# Patient Record
Sex: FEMALE | Race: WHITE | NOT HISPANIC OR LATINO | Employment: UNEMPLOYED | URBAN - METROPOLITAN AREA
[De-identification: names, ages, dates, MRNs, and addresses within clinical notes are randomized per-mention and may not be internally consistent; named-entity substitution may affect disease eponyms.]

---

## 2023-10-09 ENCOUNTER — HOSPITAL ENCOUNTER (EMERGENCY)
Facility: HOSPITAL | Age: 83
Discharge: HOME | End: 2023-10-09
Attending: STUDENT IN AN ORGANIZED HEALTH CARE EDUCATION/TRAINING PROGRAM
Payer: MEDICARE

## 2023-10-09 VITALS
BODY MASS INDEX: 46.65 KG/M2 | RESPIRATION RATE: 16 BRPM | SYSTOLIC BLOOD PRESSURE: 132 MMHG | HEIGHT: 65 IN | OXYGEN SATURATION: 96 % | TEMPERATURE: 98.1 F | DIASTOLIC BLOOD PRESSURE: 64 MMHG | WEIGHT: 279.98 LBS | HEART RATE: 72 BPM

## 2023-10-09 DIAGNOSIS — N30.90 CYSTITIS: Primary | ICD-10-CM

## 2023-10-09 LAB
APPEARANCE UR: CLEAR
BILIRUB UR STRIP.AUTO-MCNC: NEGATIVE MG/DL
COLOR UR: ABNORMAL
GLUCOSE UR STRIP.AUTO-MCNC: NORMAL MG/DL
KETONES UR STRIP.AUTO-MCNC: NEGATIVE MG/DL
LEUKOCYTE ESTERASE UR QL STRIP.AUTO: ABNORMAL
NITRITE UR QL STRIP.AUTO: NEGATIVE
PH UR STRIP.AUTO: 7.5 [PH]
PROT UR STRIP.AUTO-MCNC: ABNORMAL MG/DL
RBC # UR STRIP.AUTO: NEGATIVE /UL
SP GR UR STRIP.AUTO: 1.01
UROBILINOGEN UR STRIP.AUTO-MCNC: NORMAL MG/DL

## 2023-10-09 PROCEDURE — 99283 EMERGENCY DEPT VISIT LOW MDM: CPT | Performed by: STUDENT IN AN ORGANIZED HEALTH CARE EDUCATION/TRAINING PROGRAM

## 2023-10-09 PROCEDURE — 87086 URINE CULTURE/COLONY COUNT: CPT | Mod: CMCLAB,WESLAB | Performed by: STUDENT IN AN ORGANIZED HEALTH CARE EDUCATION/TRAINING PROGRAM

## 2023-10-09 PROCEDURE — 2500000001 HC RX 250 WO HCPCS SELF ADMINISTERED DRUGS (ALT 637 FOR MEDICARE OP): Performed by: STUDENT IN AN ORGANIZED HEALTH CARE EDUCATION/TRAINING PROGRAM

## 2023-10-09 PROCEDURE — 81003 URINALYSIS AUTO W/O SCOPE: CPT | Performed by: STUDENT IN AN ORGANIZED HEALTH CARE EDUCATION/TRAINING PROGRAM

## 2023-10-09 RX ORDER — CARVEDILOL 25 MG/1
25 TABLET ORAL
COMMUNITY
End: 2023-11-15 | Stop reason: HOSPADM

## 2023-10-09 RX ORDER — FERROUS SULFATE 325(65) MG
65 TABLET, DELAYED RELEASE (ENTERIC COATED) ORAL
Status: ON HOLD | COMMUNITY
End: 2023-10-17

## 2023-10-09 RX ORDER — INSULIN ASPART 100 [IU]/ML
100 INJECTION, SOLUTION INTRAVENOUS; SUBCUTANEOUS
COMMUNITY
End: 2023-11-15 | Stop reason: HOSPADM

## 2023-10-09 RX ORDER — VITAMIN E MIXED 400 UNIT
100 CAPSULE ORAL DAILY
Status: ON HOLD | COMMUNITY
End: 2023-10-17

## 2023-10-09 RX ORDER — CEFDINIR 300 MG/1
300 CAPSULE ORAL ONCE
Status: COMPLETED | OUTPATIENT
Start: 2023-10-09 | End: 2023-10-09

## 2023-10-09 RX ORDER — CEFDINIR 300 MG/1
300 CAPSULE ORAL 2 TIMES DAILY
Qty: 14 CAPSULE | Refills: 0 | Status: SHIPPED | OUTPATIENT
Start: 2023-10-09 | End: 2023-11-15 | Stop reason: HOSPADM

## 2023-10-09 RX ORDER — CALCIUM CARBONATE 300MG(750)
400 TABLET,CHEWABLE ORAL DAILY
Status: ON HOLD | COMMUNITY
End: 2023-11-15 | Stop reason: SDUPTHER

## 2023-10-09 RX ORDER — ATORVASTATIN CALCIUM 40 MG/1
40 TABLET, FILM COATED ORAL DAILY
COMMUNITY
End: 2023-11-15 | Stop reason: HOSPADM

## 2023-10-09 RX ORDER — LOSARTAN POTASSIUM 50 MG/1
100 TABLET ORAL DAILY
COMMUNITY
End: 2023-11-15 | Stop reason: HOSPADM

## 2023-10-09 RX ORDER — PEN NEEDLE, DIABETIC 30 GX3/16"
NEEDLE, DISPOSABLE MISCELLANEOUS
COMMUNITY

## 2023-10-09 RX ORDER — OMEPRAZOLE 40 MG/1
40 CAPSULE, DELAYED RELEASE ORAL
COMMUNITY
End: 2023-11-15 | Stop reason: HOSPADM

## 2023-10-09 RX ORDER — FLUTICASONE PROPIONATE AND SALMETEROL XINAFOATE 115; 21 UG/1; UG/1
2 AEROSOL, METERED RESPIRATORY (INHALATION)
Status: ON HOLD | COMMUNITY
End: 2023-10-17

## 2023-10-09 RX ORDER — VENLAFAXINE HYDROCHLORIDE 150 MG/1
150 CAPSULE, EXTENDED RELEASE ORAL DAILY
COMMUNITY
End: 2023-11-15 | Stop reason: HOSPADM

## 2023-10-09 RX ORDER — AMLODIPINE BESYLATE 10 MG/1
10 TABLET ORAL DAILY
COMMUNITY
End: 2023-11-15 | Stop reason: HOSPADM

## 2023-10-09 RX ORDER — HYDROCHLOROTHIAZIDE 50 MG/1
50 TABLET ORAL DAILY
Status: ON HOLD | COMMUNITY
End: 2023-10-17

## 2023-10-09 RX ORDER — LATANOPROST 50 UG/ML
1 SOLUTION/ DROPS OPHTHALMIC NIGHTLY
Status: ON HOLD | COMMUNITY
End: 2023-11-15 | Stop reason: SDUPTHER

## 2023-10-09 RX ADMIN — CEFDINIR 300 MG: 300 CAPSULE ORAL at 11:57

## 2023-10-09 ASSESSMENT — PAIN - FUNCTIONAL ASSESSMENT
PAIN_FUNCTIONAL_ASSESSMENT: 0-10
PAIN_FUNCTIONAL_ASSESSMENT: 0-10

## 2023-10-09 ASSESSMENT — PAIN DESCRIPTION - LOCATION: LOCATION: BACK

## 2023-10-09 ASSESSMENT — LIFESTYLE VARIABLES
HAVE YOU EVER FELT YOU SHOULD CUT DOWN ON YOUR DRINKING: NO
EVER FELT BAD OR GUILTY ABOUT YOUR DRINKING: NO
EVER HAD A DRINK FIRST THING IN THE MORNING TO STEADY YOUR NERVES TO GET RID OF A HANGOVER: NO
HAVE PEOPLE ANNOYED YOU BY CRITICIZING YOUR DRINKING: NO

## 2023-10-09 ASSESSMENT — PAIN DESCRIPTION - ORIENTATION: ORIENTATION: LOWER

## 2023-10-09 ASSESSMENT — COLUMBIA-SUICIDE SEVERITY RATING SCALE - C-SSRS
1. IN THE PAST MONTH, HAVE YOU WISHED YOU WERE DEAD OR WISHED YOU COULD GO TO SLEEP AND NOT WAKE UP?: NO
2. HAVE YOU ACTUALLY HAD ANY THOUGHTS OF KILLING YOURSELF?: NO
6. HAVE YOU EVER DONE ANYTHING, STARTED TO DO ANYTHING, OR PREPARED TO DO ANYTHING TO END YOUR LIFE?: NO

## 2023-10-09 ASSESSMENT — PAIN SCALES - GENERAL: PAINLEVEL_OUTOF10: 2

## 2023-10-09 NOTE — ED PROVIDER NOTES
HPI   Chief Complaint   Patient presents with    Urinary Frequency       Patient is an 83-year-old female that presents emergency department for evaluation of dysuria, suprapubic pressure.  Patient states symptoms started yesterday evening.  She has had multiple episodes of feeling as though she needs to urinate throughout that time.  It has been gradually getting worse.  She admits to some mild suprapubic pressure throughout that time as well.  She denies dysuria, fever, chills, chest pain, shortness of breath, nausea or vomiting.  Out of concern for possible urinary tract infection she came in for further treatment and evaluation.      History provided by:  Patient                      Iban Coma Scale Score: 15                  Patient History   Past Medical History:   Diagnosis Date    Diabetes mellitus (CMS/HCC)     Hypertension      Past Surgical History:   Procedure Laterality Date    APPENDECTOMY  10/13/2015    Appendectomy    CATARACT EXTRACTION  10/13/2015    Cataract Surgery    CHOLECYSTECTOMY  10/13/2015    Cholecystectomy    CT GUIDED IMAGING FOR NEEDLE PLACEMENT  2/4/2015    CT GUIDED IMAGING FOR NEEDLE PLACEMENT [facility]    HEMICOLECTOMY  10/13/2015    Hemicolectomy    HYSTERECTOMY  10/13/2015    Hysterectomy    LUNG LOBECTOMY  10/13/2015    Lung Lobectomy     No family history on file.  Social History     Tobacco Use    Smoking status: Never    Smokeless tobacco: Never   Substance Use Topics    Alcohol use: Not on file    Drug use: Not on file       Physical Exam   ED Triage Vitals [10/09/23 0902]   Temp Heart Rate Resp BP   36.7 °C (98.1 °F) 73 16 175/75      SpO2 Temp Source Heart Rate Source Patient Position   96 % Oral Monitor Sitting      BP Location FiO2 (%)     Left arm --       Physical Exam  Vitals and nursing note reviewed.   Constitutional:       General: She is not in acute distress.     Appearance: Normal appearance. She is not ill-appearing.   HENT:      Head: Normocephalic  and atraumatic.   Eyes:      Extraocular Movements: Extraocular movements intact.      Pupils: Pupils are equal, round, and reactive to light.   Cardiovascular:      Rate and Rhythm: Normal rate and regular rhythm.   Pulmonary:      Effort: Pulmonary effort is normal. No respiratory distress.   Abdominal:      General: Abdomen is flat. There is no distension.      Palpations: Abdomen is soft.      Tenderness: There is no abdominal tenderness. There is no guarding or rebound.   Musculoskeletal:         General: No swelling.   Skin:     General: Skin is warm and dry.   Neurological:      General: No focal deficit present.      Mental Status: She is alert.       Recent Results (from the past 24 hour(s))   Urinalysis with Reflex Microscopic and Culture    Collection Time: 10/09/23  9:32 AM   Result Value Ref Range    Color, Urine Light-Yellow Light-Yellow, Yellow, Dark-Yellow    Appearance, Urine Clear Clear    Specific Gravity, Urine 1.014 1.005 - 1.035    pH, Urine 7.5 5.0, 5.5, 6.0, 6.5, 7.0, 7.5, 8.0    Protein, Urine 100 (2+) (A) NEGATIVE, 10 (TRACE), 20 (TRACE) mg/dL    Glucose, Urine Normal Normal mg/dL    Blood, Urine NEGATIVE NEGATIVE    Ketones, Urine NEGATIVE NEGATIVE mg/dL    Bilirubin, Urine NEGATIVE NEGATIVE    Urobilinogen, Urine Normal Normal mg/dL    Nitrite, Urine NEGATIVE NEGATIVE    Leukocyte Esterase, Urine 75 Leatha/µL (A) NEGATIVE         ED Course & MDM   Diagnoses as of 10/09/23 1109   Cystitis       Medical Decision Making  Patient is an 83-year-old female who presents emergency department for evaluation of increased urinary frequency.  Patient uncomfortable appearing but in no obvious distress on exam.  Vital signs are stable on arrival.  She is awake and alert and oriented and nontoxic-appearing.  Urinalysis ordered at this time however given her benign abdominal exam no CT scan of the abdomen pelvis was ordered.  Urinalysis is consistent with a urinary tract infection and patient will be  treated with p.o. cefdinir.  She was given first dose in the emergency department.  Return precautions were discussed with patient and she is agreeable for outpatient follow-up with her primary care physician.        Procedure  Procedures     Edgardo Villaseñor DO  10/09/23 1111

## 2023-10-09 NOTE — DISCHARGE INSTRUCTIONS
Follow-up with your primary care physician in the next 2 to 3 days for reevaluation.  If symptoms worsen or change he can return at any time for further evaluation and treatment.  It is very important that you take antibiotics to completion and stay hydrated at home by drinking plenty of fluids.

## 2023-10-10 LAB — BACTERIA UR CULT: NORMAL

## 2023-10-16 ENCOUNTER — HOSPITAL ENCOUNTER (EMERGENCY)
Facility: HOSPITAL | Age: 83
Discharge: OTHER INSTITUTION | DRG: 885 | End: 2023-10-17
Attending: STUDENT IN AN ORGANIZED HEALTH CARE EDUCATION/TRAINING PROGRAM
Payer: MEDICARE

## 2023-10-16 DIAGNOSIS — F32.A DEPRESSION WITH SUICIDAL IDEATION: Primary | ICD-10-CM

## 2023-10-16 DIAGNOSIS — R45.851 DEPRESSION WITH SUICIDAL IDEATION: Primary | ICD-10-CM

## 2023-10-16 LAB
ALBUMIN SERPL-MCNC: 3.7 G/DL (ref 3.5–5)
ALP BLD-CCNC: 74 U/L (ref 35–125)
ALT SERPL-CCNC: 11 U/L (ref 5–40)
AMPHETAMINES UR QL SCN>1000 NG/ML: NEGATIVE
ANION GAP SERPL CALC-SCNC: 11 MMOL/L
APPEARANCE UR: CLEAR
AST SERPL-CCNC: 16 U/L (ref 5–40)
BARBITURATES UR QL SCN>300 NG/ML: NEGATIVE
BASOPHILS # BLD AUTO: 0.04 X10*3/UL (ref 0–0.1)
BASOPHILS NFR BLD AUTO: 0.6 %
BENZODIAZ UR QL SCN>300 NG/ML: NEGATIVE
BILIRUB SERPL-MCNC: 0.3 MG/DL (ref 0.1–1.2)
BILIRUB UR STRIP.AUTO-MCNC: NEGATIVE MG/DL
BUN SERPL-MCNC: 31 MG/DL (ref 8–25)
BZE UR QL SCN>300 NG/ML: NEGATIVE
CALCIUM SERPL-MCNC: 9.3 MG/DL (ref 8.5–10.4)
CANNABINOIDS UR QL SCN>50 NG/ML: NEGATIVE
CHLORIDE SERPL-SCNC: 108 MMOL/L (ref 97–107)
CO2 SERPL-SCNC: 26 MMOL/L (ref 24–31)
COLOR UR: YELLOW
CREAT SERPL-MCNC: 1.2 MG/DL (ref 0.4–1.6)
EOSINOPHIL # BLD AUTO: 0.14 X10*3/UL (ref 0–0.4)
EOSINOPHIL NFR BLD AUTO: 2.2 %
ERYTHROCYTE [DISTWIDTH] IN BLOOD BY AUTOMATED COUNT: 14.3 % (ref 11.5–14.5)
FENTANYL+NORFENTANYL UR QL SCN: NEGATIVE
GFR SERPL CREATININE-BSD FRML MDRD: 46 ML/MIN/1.73M*2
GLUCOSE SERPL-MCNC: 199 MG/DL (ref 65–99)
GLUCOSE UR STRIP.AUTO-MCNC: ABNORMAL MG/DL
HCT VFR BLD AUTO: 44.1 % (ref 36–46)
HGB BLD-MCNC: 14.1 G/DL (ref 12–16)
HOLD SPECIMEN: NORMAL
HYALINE CASTS #/AREA URNS AUTO: ABNORMAL /LPF
IMM GRANULOCYTES # BLD AUTO: 0.01 X10*3/UL (ref 0–0.5)
IMM GRANULOCYTES NFR BLD AUTO: 0.2 % (ref 0–0.9)
KETONES UR STRIP.AUTO-MCNC: NEGATIVE MG/DL
LEUKOCYTE ESTERASE UR QL STRIP.AUTO: ABNORMAL
LYMPHOCYTES # BLD AUTO: 1.58 X10*3/UL (ref 0.8–3)
LYMPHOCYTES NFR BLD AUTO: 24.5 %
MCH RBC QN AUTO: 28.7 PG (ref 26–34)
MCHC RBC AUTO-ENTMCNC: 32 G/DL (ref 32–36)
MCV RBC AUTO: 90 FL (ref 80–100)
METHADONE UR QL SCN>300 NG/ML: NEGATIVE
MONOCYTES # BLD AUTO: 0.82 X10*3/UL (ref 0.05–0.8)
MONOCYTES NFR BLD AUTO: 12.7 %
MUCOUS THREADS #/AREA URNS AUTO: ABNORMAL /LPF
NEUTROPHILS # BLD AUTO: 3.85 X10*3/UL (ref 1.6–5.5)
NEUTROPHILS NFR BLD AUTO: 59.8 %
NITRITE UR QL STRIP.AUTO: NEGATIVE
NRBC BLD-RTO: 0 /100 WBCS (ref 0–0)
OPIATES UR QL SCN>300 NG/ML: NEGATIVE
OXYCODONE UR QL: NEGATIVE
PCP UR QL SCN>25 NG/ML: POSITIVE
PH UR STRIP.AUTO: 5 [PH]
PLATELET # BLD AUTO: 223 X10*3/UL (ref 150–450)
PMV BLD AUTO: 10.5 FL (ref 7.5–11.5)
POTASSIUM SERPL-SCNC: 3.7 MMOL/L (ref 3.4–5.1)
PROT SERPL-MCNC: 6.9 G/DL (ref 5.9–7.9)
PROT UR STRIP.AUTO-MCNC: ABNORMAL MG/DL
RBC # BLD AUTO: 4.91 X10*6/UL (ref 4–5.2)
RBC # UR STRIP.AUTO: NEGATIVE /UL
RBC #/AREA URNS AUTO: ABNORMAL /HPF
SARS-COV-2 RNA RESP QL NAA+PROBE: NOT DETECTED
SODIUM SERPL-SCNC: 145 MMOL/L (ref 133–145)
SP GR UR STRIP.AUTO: 1.02
SQUAMOUS #/AREA URNS AUTO: ABNORMAL /HPF
TRANS CELLS #/AREA UR COMP ASSIST: ABNORMAL /HPF
UROBILINOGEN UR STRIP.AUTO-MCNC: NORMAL MG/DL
WBC # BLD AUTO: 6.4 X10*3/UL (ref 4.4–11.3)
WBC #/AREA URNS AUTO: ABNORMAL /HPF

## 2023-10-16 PROCEDURE — 99284 EMERGENCY DEPT VISIT MOD MDM: CPT | Performed by: STUDENT IN AN ORGANIZED HEALTH CARE EDUCATION/TRAINING PROGRAM

## 2023-10-16 PROCEDURE — 80307 DRUG TEST PRSMV CHEM ANLYZR: CPT

## 2023-10-16 PROCEDURE — 36415 COLL VENOUS BLD VENIPUNCTURE: CPT

## 2023-10-16 PROCEDURE — 87086 URINE CULTURE/COLONY COUNT: CPT | Mod: CMCLAB,WESLAB

## 2023-10-16 PROCEDURE — 81001 URINALYSIS AUTO W/SCOPE: CPT

## 2023-10-16 PROCEDURE — 80320 DRUG SCREEN QUANTALCOHOLS: CPT | Mod: CMCLAB,WESLAB

## 2023-10-16 PROCEDURE — 85025 COMPLETE CBC W/AUTO DIFF WBC: CPT

## 2023-10-16 PROCEDURE — 80329 ANALGESICS NON-OPIOID 1 OR 2: CPT | Mod: CMCLAB,WESLAB

## 2023-10-16 PROCEDURE — 87635 SARS-COV-2 COVID-19 AMP PRB: CPT

## 2023-10-16 PROCEDURE — 83992 ASSAY FOR PHENCYCLIDINE: CPT

## 2023-10-16 PROCEDURE — 80053 COMPREHEN METABOLIC PANEL: CPT

## 2023-10-16 PROCEDURE — G0480 DRUG TEST DEF 1-7 CLASSES: HCPCS | Mod: CMCLAB,WESLAB

## 2023-10-16 PROCEDURE — G0480 DRUG TEST DEF 1-7 CLASSES: HCPCS

## 2023-10-16 SDOH — HEALTH STABILITY: MENTAL HEALTH: NON-SPECIFIC ACTIVE SUICIDAL THOUGHTS (PAST 1 MONTH): NO

## 2023-10-16 SDOH — HEALTH STABILITY: MENTAL HEALTH: DEPRESSION SYMPTOMS: CRYING;FEELINGS OF WORTHLESSNESS;FEELINGS OF HELPLESSNESS

## 2023-10-16 SDOH — HEALTH STABILITY: MENTAL HEALTH: ARE YOU HAVING THOUGHTS OF KILLING YOURSELF RIGHT NOW?: NO

## 2023-10-16 SDOH — HEALTH STABILITY: MENTAL HEALTH: SUICIDAL BEHAVIOR (LIFETIME): NO

## 2023-10-16 SDOH — HEALTH STABILITY: MENTAL HEALTH: IN THE PAST WEEK, HAVE YOU BEEN HAVING THOUGHTS ABOUT KILLING YOURSELF?: YES

## 2023-10-16 SDOH — HEALTH STABILITY: MENTAL HEALTH: IN THE PAST FEW WEEKS, HAVE YOU FELT THAT YOU OR YOUR FAMILY WOULD BE BETTER OFF IF YOU WERE DEAD?: YES

## 2023-10-16 SDOH — HEALTH STABILITY: MENTAL HEALTH: ANXIETY SYMPTOMS: GENERALIZED

## 2023-10-16 SDOH — HEALTH STABILITY: MENTAL HEALTH: IN THE PAST FEW WEEKS, HAVE YOU WISHED YOU WERE DEAD?: YES

## 2023-10-16 SDOH — HEALTH STABILITY: MENTAL HEALTH: WISH TO BE DEAD (PAST 1 MONTH): YES

## 2023-10-16 SDOH — HEALTH STABILITY: MENTAL HEALTH: HAVE YOU EVER TRIED TO KILL YOURSELF?: NO

## 2023-10-16 ASSESSMENT — LIFESTYLE VARIABLES
HAVE PEOPLE ANNOYED YOU BY CRITICIZING YOUR DRINKING: NO
EVER HAD A DRINK FIRST THING IN THE MORNING TO STEADY YOUR NERVES TO GET RID OF A HANGOVER: NO
SUBSTANCE_ABUSE_PAST_12_MONTHS: NO
PRESCIPTION_ABUSE_PAST_12_MONTHS: NO
HAVE YOU EVER FELT YOU SHOULD CUT DOWN ON YOUR DRINKING: NO
EVER FELT BAD OR GUILTY ABOUT YOUR DRINKING: NO
REASON UNABLE TO ASSESS: NO

## 2023-10-16 ASSESSMENT — PAIN SCALES - GENERAL
PAINLEVEL_OUTOF10: 0 - NO PAIN
PAINLEVEL_OUTOF10: 0 - NO PAIN

## 2023-10-16 ASSESSMENT — PAIN - FUNCTIONAL ASSESSMENT: PAIN_FUNCTIONAL_ASSESSMENT: 0-10
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
## 2023-10-16 NOTE — ED TRIAGE NOTES
Pt brought in By [facility] Pt states she's depressed and wants to talk to someone.Pt states she doesn't want to live anymore.Pt is also homeless.
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
## 2023-10-17 ENCOUNTER — HOSPITAL ENCOUNTER (INPATIENT)
Facility: HOSPITAL | Age: 83
LOS: 29 days | Discharge: HOSPICE | DRG: 885 | End: 2023-11-15
Attending: PSYCHIATRY & NEUROLOGY | Admitting: PSYCHIATRY & NEUROLOGY
Payer: MEDICARE

## 2023-10-17 VITALS
BODY MASS INDEX: 47.63 KG/M2 | SYSTOLIC BLOOD PRESSURE: 148 MMHG | HEART RATE: 79 BPM | WEIGHT: 279 LBS | RESPIRATION RATE: 16 BRPM | OXYGEN SATURATION: 98 % | TEMPERATURE: 96.8 F | HEIGHT: 64 IN | DIASTOLIC BLOOD PRESSURE: 72 MMHG

## 2023-10-17 DIAGNOSIS — K21.9 GASTROESOPHAGEAL REFLUX DISEASE WITHOUT ESOPHAGITIS: ICD-10-CM

## 2023-10-17 DIAGNOSIS — I10 PRIMARY HYPERTENSION: ICD-10-CM

## 2023-10-17 DIAGNOSIS — Z79.4 TYPE 2 DIABETES MELLITUS WITHOUT COMPLICATION, WITH LONG-TERM CURRENT USE OF INSULIN (MULTI): ICD-10-CM

## 2023-10-17 DIAGNOSIS — E83.42 HYPOMAGNESEMIA: ICD-10-CM

## 2023-10-17 DIAGNOSIS — E78.5 HYPERLIPIDEMIA, UNSPECIFIED HYPERLIPIDEMIA TYPE: ICD-10-CM

## 2023-10-17 DIAGNOSIS — H57.9 EYE DISEASE: ICD-10-CM

## 2023-10-17 DIAGNOSIS — F39 MOOD DISORDER (CMS-HCC): Primary | ICD-10-CM

## 2023-10-17 DIAGNOSIS — E11.9 TYPE 2 DIABETES MELLITUS WITHOUT COMPLICATION, WITH LONG-TERM CURRENT USE OF INSULIN (MULTI): ICD-10-CM

## 2023-10-17 LAB
APAP SERPL-MCNC: <10 UG/ML
ETHANOL SERPL-MCNC: <10 MG/DL
GLUCOSE BLD MANUAL STRIP-MCNC: 150 MG/DL (ref 74–99)
GLUCOSE BLD MANUAL STRIP-MCNC: 154 MG/DL (ref 74–99)
GLUCOSE BLD MANUAL STRIP-MCNC: 163 MG/DL (ref 74–99)
GLUCOSE BLD MANUAL STRIP-MCNC: 214 MG/DL (ref 74–99)
SALICYLATES SERPL-MCNC: <3 MG/DL

## 2023-10-17 PROCEDURE — 99222 1ST HOSP IP/OBS MODERATE 55: CPT | Performed by: REGISTERED NURSE

## 2023-10-17 PROCEDURE — 2500000001 HC RX 250 WO HCPCS SELF ADMINISTERED DRUGS (ALT 637 FOR MEDICARE OP): Performed by: PSYCHIATRY & NEUROLOGY

## 2023-10-17 PROCEDURE — 1140000001 HC PRIVATE PSYCH ROOM DAILY

## 2023-10-17 PROCEDURE — 82947 ASSAY GLUCOSE BLOOD QUANT: CPT

## 2023-10-17 RX ORDER — INSULIN DEGLUDEC 200 U/ML
40 INJECTION, SOLUTION SUBCUTANEOUS NIGHTLY
COMMUNITY
End: 2023-11-15 | Stop reason: HOSPADM

## 2023-10-17 RX ORDER — TRAZODONE HYDROCHLORIDE 50 MG/1
25 TABLET ORAL NIGHTLY PRN
Status: DISCONTINUED | OUTPATIENT
Start: 2023-10-17 | End: 2023-11-15 | Stop reason: HOSPADM

## 2023-10-17 RX ORDER — DIPHENHYDRAMINE HYDROCHLORIDE 50 MG/ML
50 INJECTION INTRAMUSCULAR; INTRAVENOUS ONCE AS NEEDED
Status: DISCONTINUED | OUTPATIENT
Start: 2023-10-17 | End: 2023-11-15 | Stop reason: HOSPADM

## 2023-10-17 RX ORDER — ICOSAPENT ETHYL 1 G/1
1 CAPSULE ORAL
Status: ON HOLD | COMMUNITY
End: 2023-11-15 | Stop reason: SDUPTHER

## 2023-10-17 RX ORDER — ALUMINUM HYDROXIDE, MAGNESIUM HYDROXIDE, AND SIMETHICONE 1200; 120; 1200 MG/30ML; MG/30ML; MG/30ML
30 SUSPENSION ORAL EVERY 6 HOURS PRN
Status: DISCONTINUED | OUTPATIENT
Start: 2023-10-17 | End: 2023-11-15 | Stop reason: HOSPADM

## 2023-10-17 RX ORDER — MAGNESIUM HYDROXIDE 2400 MG/10ML
10 SUSPENSION ORAL DAILY PRN
Status: DISCONTINUED | OUTPATIENT
Start: 2023-10-17 | End: 2023-10-17

## 2023-10-17 RX ORDER — OLANZAPINE 10 MG/2ML
5 INJECTION, POWDER, FOR SOLUTION INTRAMUSCULAR EVERY 6 HOURS PRN
Status: DISCONTINUED | OUTPATIENT
Start: 2023-10-17 | End: 2023-11-15 | Stop reason: HOSPADM

## 2023-10-17 RX ORDER — DIPHENHYDRAMINE HCL 50 MG
50 CAPSULE ORAL EVERY 6 HOURS PRN
Status: DISCONTINUED | OUTPATIENT
Start: 2023-10-17 | End: 2023-11-15 | Stop reason: HOSPADM

## 2023-10-17 RX ORDER — OLANZAPINE 5 MG/1
5 TABLET ORAL EVERY 6 HOURS PRN
Status: DISCONTINUED | OUTPATIENT
Start: 2023-10-17 | End: 2023-11-15 | Stop reason: HOSPADM

## 2023-10-17 RX ORDER — VENLAFAXINE HYDROCHLORIDE 150 MG/1
150 CAPSULE, EXTENDED RELEASE ORAL DAILY
Status: DISCONTINUED | OUTPATIENT
Start: 2023-10-17 | End: 2023-11-15 | Stop reason: HOSPADM

## 2023-10-17 RX ORDER — ACETAMINOPHEN 325 MG/1
650 TABLET ORAL EVERY 4 HOURS PRN
Status: DISCONTINUED | OUTPATIENT
Start: 2023-10-17 | End: 2023-11-15 | Stop reason: HOSPADM

## 2023-10-17 RX ORDER — POTASSIUM CHLORIDE 750 MG/1
20 CAPSULE, EXTENDED RELEASE ORAL DAILY
COMMUNITY
End: 2023-11-15 | Stop reason: HOSPADM

## 2023-10-17 RX ORDER — ADHESIVE BANDAGE
30 BANDAGE TOPICAL DAILY PRN
Status: DISCONTINUED | OUTPATIENT
Start: 2023-10-17 | End: 2023-11-15 | Stop reason: HOSPADM

## 2023-10-17 RX ADMIN — VENLAFAXINE HYDROCHLORIDE 150 MG: 150 CAPSULE, EXTENDED RELEASE ORAL at 16:39

## 2023-10-17 SDOH — SOCIAL STABILITY: SOCIAL INSECURITY
WITHIN THE LAST YEAR, HAVE YOU BEEN KICKED, HIT, SLAPPED, OR OTHERWISE PHYSICALLY HURT BY YOUR PARTNER OR EX-PARTNER?: NO

## 2023-10-17 SDOH — ECONOMIC STABILITY: INCOME INSECURITY: HOW HARD IS IT FOR YOU TO PAY FOR THE VERY BASICS LIKE FOOD, HOUSING, MEDICAL CARE, AND HEATING?: HARD

## 2023-10-17 SDOH — HEALTH STABILITY: MENTAL HEALTH
STRESS IS WHEN SOMEONE FEELS TENSE, NERVOUS, ANXIOUS, OR CAN'T SLEEP AT NIGHT BECAUSE THEIR MIND IS TROUBLED. HOW STRESSED ARE YOU?: TO SOME EXTENT

## 2023-10-17 SDOH — HEALTH STABILITY: MENTAL HEALTH: HAVE YOU USED ANY PRESCRIPTION DRUGS OTHER THAN PRESCRIBED IN THE PAST 12 MONTHS?: NO

## 2023-10-17 SDOH — ECONOMIC STABILITY: HOUSING INSECURITY
IN THE LAST 12 MONTHS, WAS THERE A TIME WHEN YOU DID NOT HAVE A STEADY PLACE TO SLEEP OR SLEPT IN A SHELTER (INCLUDING NOW)?: YES

## 2023-10-17 SDOH — HEALTH STABILITY: MENTAL HEALTH: HOW OFTEN DO YOU HAVE A DRINK CONTAINING ALCOHOL?: NEVER

## 2023-10-17 SDOH — SOCIAL STABILITY: SOCIAL INSECURITY: WITHIN THE LAST YEAR, HAVE YOU BEEN HUMILIATED OR EMOTIONALLY ABUSED IN OTHER WAYS BY YOUR PARTNER OR EX-PARTNER?: NO

## 2023-10-17 SDOH — HEALTH STABILITY: MENTAL HEALTH: HOW OFTEN DO YOU HAVE 6 OR MORE DRINKS ON ONE OCCASION?: NEVER

## 2023-10-17 SDOH — SOCIAL STABILITY: SOCIAL NETWORK: IN A TYPICAL WEEK, HOW MANY TIMES DO YOU TALK ON THE PHONE WITH FAMILY, FRIENDS, OR NEIGHBORS?: TWICE A WEEK

## 2023-10-17 SDOH — SOCIAL STABILITY: SOCIAL INSECURITY: WITHIN THE LAST YEAR, HAVE YOU BEEN AFRAID OF YOUR PARTNER OR EX-PARTNER?: NO

## 2023-10-17 SDOH — SOCIAL STABILITY: SOCIAL INSECURITY
WITHIN THE LAST YEAR, HAVE TO BEEN RAPED OR FORCED TO HAVE ANY KIND OF SEXUAL ACTIVITY BY YOUR PARTNER OR EX-PARTNER?: NO

## 2023-10-17 SDOH — ECONOMIC STABILITY: FOOD INSECURITY: WITHIN THE PAST 12 MONTHS, YOU WORRIED THAT YOUR FOOD WOULD RUN OUT BEFORE YOU GOT MONEY TO BUY MORE.: SOMETIMES TRUE

## 2023-10-17 SDOH — SOCIAL STABILITY: SOCIAL NETWORK: HOW OFTEN DO YOU GET TOGETHER WITH FRIENDS OR RELATIVES?: ONCE A WEEK

## 2023-10-17 SDOH — ECONOMIC STABILITY: INCOME INSECURITY: IN THE LAST 12 MONTHS, WAS THERE A TIME WHEN YOU WERE NOT ABLE TO PAY THE MORTGAGE OR RENT ON TIME?: YES

## 2023-10-17 SDOH — SOCIAL STABILITY: SOCIAL NETWORK
DO YOU BELONG TO ANY CLUBS OR ORGANIZATIONS SUCH AS CHURCH GROUPS UNIONS, FRATERNAL OR ATHLETIC GROUPS, OR SCHOOL GROUPS?: YES

## 2023-10-17 SDOH — SOCIAL STABILITY: SOCIAL NETWORK: ARE YOU MARRIED, WIDOWED, DIVORCED, SEPARATED, NEVER MARRIED, OR LIVING WITH A PARTNER?: WIDOWED

## 2023-10-17 SDOH — ECONOMIC STABILITY: HOUSING INSECURITY: IN THE LAST 12 MONTHS, HOW MANY PLACES HAVE YOU LIVED?: 2

## 2023-10-17 SDOH — ECONOMIC STABILITY: FOOD INSECURITY: WITHIN THE PAST 12 MONTHS, THE FOOD YOU BOUGHT JUST DIDN'T LAST AND YOU DIDN'T HAVE MONEY TO GET MORE.: SOMETIMES TRUE

## 2023-10-17 SDOH — HEALTH STABILITY: MENTAL HEALTH: HAVE YOU USED ANY SUBSTANCES (CANABIS, COCAINE, HEROIN, HALLUCINOGENS, INHALANTS, ETC.) IN THE PAST 12 MONTHS?: NO

## 2023-10-17 SDOH — SOCIAL STABILITY: SOCIAL NETWORK: HOW OFTEN DO YOU ATTENT MEETINGS OF THE CLUB OR ORGANIZATION YOU BELONG TO?: NEVER

## 2023-10-17 SDOH — HEALTH STABILITY: PHYSICAL HEALTH: ON AVERAGE, HOW MANY MINUTES DO YOU ENGAGE IN EXERCISE AT THIS LEVEL?: 0 MIN

## 2023-10-17 SDOH — HEALTH STABILITY: MENTAL HEALTH: HOW MANY STANDARD DRINKS CONTAINING ALCOHOL DO YOU HAVE ON A TYPICAL DAY?: PATIENT DOES NOT DRINK

## 2023-10-17 SDOH — ECONOMIC STABILITY: TRANSPORTATION INSECURITY
IN THE PAST 12 MONTHS, HAS THE LACK OF TRANSPORTATION KEPT YOU FROM MEDICAL APPOINTMENTS OR FROM GETTING MEDICATIONS?: YES

## 2023-10-17 SDOH — SOCIAL STABILITY: SOCIAL NETWORK: HOW OFTEN DO YOU ATTEND CHURCH OR RELIGIOUS SERVICES?: 1 TO 4 TIMES PER YEAR

## 2023-10-17 SDOH — HEALTH STABILITY: PHYSICAL HEALTH: ON AVERAGE, HOW MANY DAYS PER WEEK DO YOU ENGAGE IN MODERATE TO STRENUOUS EXERCISE (LIKE A BRISK WALK)?: 0 DAYS

## 2023-10-17 SDOH — ECONOMIC STABILITY: TRANSPORTATION INSECURITY
IN THE PAST 12 MONTHS, HAS LACK OF TRANSPORTATION KEPT YOU FROM MEETINGS, WORK, OR FROM GETTING THINGS NEEDED FOR DAILY LIVING?: YES

## 2023-10-17 ASSESSMENT — COLUMBIA-SUICIDE SEVERITY RATING SCALE - C-SSRS
6. HAVE YOU EVER DONE ANYTHING, STARTED TO DO ANYTHING, OR PREPARED TO DO ANYTHING TO END YOUR LIFE?: NO
4. HAVE YOU HAD THESE THOUGHTS AND HAD SOME INTENTION OF ACTING ON THEM?: NO
1. IN THE PAST MONTH, HAVE YOU WISHED YOU WERE DEAD OR WISHED YOU COULD GO TO SLEEP AND NOT WAKE UP?: YES
5. HAVE YOU STARTED TO WORK OUT OR WORKED OUT THE DETAILS OF HOW TO KILL YOURSELF? DO YOU INTEND TO CARRY OUT THIS PLAN?: NO
2. HAVE YOU ACTUALLY HAD ANY THOUGHTS OF KILLING YOURSELF?: NO

## 2023-10-17 ASSESSMENT — ACTIVITIES OF DAILY LIVING (ADL)
DRESSING YOURSELF: NEEDS ASSISTANCE
PATIENT'S MEMORY ADEQUATE TO SAFELY COMPLETE DAILY ACTIVITIES?: UNABLE TO ASSESS
JUDGMENT_ADEQUATE_SAFELY_COMPLETE_DAILY_ACTIVITIES: UNABLE TO ASSESS
FEEDING YOURSELF: INDEPENDENT
BATHING: NEEDS ASSISTANCE
ADEQUATE_TO_COMPLETE_ADL: YES
WALKS IN HOME: NEEDS ASSISTANCE
HEARING - LEFT EAR: FUNCTIONAL
TOILETING: NEEDS ASSISTANCE
HEARING - RIGHT EAR: FUNCTIONAL
GROOMING: INDEPENDENT

## 2023-10-17 ASSESSMENT — PAIN SCALES - GENERAL
PAINLEVEL_OUTOF10: 0 - NO PAIN

## 2023-10-17 ASSESSMENT — LIFESTYLE VARIABLES
SKIP TO QUESTIONS 9-10: 1
AUDIT-C TOTAL SCORE: 0

## 2023-10-17 ASSESSMENT — PAIN - FUNCTIONAL ASSESSMENT: PAIN_FUNCTIONAL_ASSESSMENT: 0-10

## 2023-10-17 NOTE — PROGRESS NOTES
Emergency Medicine Transition of Care Note.    I received Madalyn Ricci in signout from previous provider.  Please see the previous ED provider note for all HPI, PE and MDM up to the time of signout at 1400. This is in addition to the primary record.    In brief Madalyn Ricci is an 83 y.o. female presenting for   Chief Complaint   Patient presents with    Mental Health Problem     At the time of signout we were awaiting: SW evaluation and final placement.    Diagnoses as of 10/17/23 0813   Depression with suicidal ideation       Medical Decision Making  Patient still pending placement at the end of my shift.  Care transitioned to oncoming provider.     Final diagnoses:   [F32.A, R45.851] Depression with suicidal ideation           Procedure  Procedures    MD Domi Ibarra MD

## 2023-10-17 NOTE — GROUP NOTE
Group Topic: Social Skills   Group Date: 10/17/2023  Start Time: 1510  End Time: 1600  Facilitators: JENNIFER Martin   Department: [facility]    Number of Participants: 8   Group Focus: leisure skills and social skills  Treatment Modality: Other: Recreation Therapy  Interventions utilized were group exercise and mental fitness  Purpose: communication skills and other: social skills, team building    Name: Madalyn Ricci YOB: 1940   MR: [id number]      Facilitator: Recreational Therapist  Level of Participation: moderate  Quality of Participation: appropriate/pleasant and cooperative  Interactions with others: appropriate  Mood/Affect: flat  Triggers (if applicable): n/a  Cognition:  unable to assess  Progress: Minimal  Comments: pt problem is SI  Plan: continue with services

## 2023-10-17 NOTE — PROGRESS NOTES
Physical Therapy                 Therapy Communication Note    Patient Name: Madalyn Ricci  MRN: [id number]  Today's Date: 10/17/2023     Discipline: Physical Therapy    Missed Visit Reason: Missed Visit Reason: Other (Comment) (attempted PT evaluation, however, patient is meeting with behavioral health NP at this time.)    Missed Time: Attempt    Comment:

## 2023-10-17 NOTE — PROGRESS NOTES
Social Work Note  9:45 Received [facility] with  Meg. Pt accepted by Dr. Brooke.

## 2023-10-17 NOTE — PROGRESS NOTES
Social Work Note    FTF with Pt and her daughter and granddaughter who are now present. Per daughter her father (pts ) passed away in 2020. Since then Pt was with her down in [place] but they moved back to [place] October 4 after struggling to care for her. Per daughter she has not been caring for herself and has been not taking her medication and not taking them appropriately. (Diabetes - insulin) She also states Pt has been depressed and refusing to eat all weekend. Pt presents confused and disheveled. Daughter states she was in contact with Pts  who has been helping to facilitate assisted living facility and apparently they have been in contact with legacy.   SW updated Pt and daughter of plan of care and explained pink slip and inpatient admission. Daughter and Pt are agreeable at this time.   Daughter Dai : [phone]
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
## 2023-10-18 PROBLEM — E11.9 TYPE 2 DIABETES MELLITUS WITHOUT COMPLICATION, WITH LONG-TERM CURRENT USE OF INSULIN (MULTI): Status: ACTIVE | Noted: 2023-10-18

## 2023-10-18 PROBLEM — E78.5 HYPERLIPIDEMIA: Status: ACTIVE | Noted: 2023-10-18

## 2023-10-18 PROBLEM — I10 PRIMARY HYPERTENSION: Status: ACTIVE | Noted: 2023-10-18

## 2023-10-18 PROBLEM — Z79.4 TYPE 2 DIABETES MELLITUS WITHOUT COMPLICATION, WITH LONG-TERM CURRENT USE OF INSULIN (MULTI): Status: ACTIVE | Noted: 2023-10-18

## 2023-10-18 PROBLEM — K21.9 GERD (GASTROESOPHAGEAL REFLUX DISEASE): Status: ACTIVE | Noted: 2023-10-18

## 2023-10-18 LAB
BACTERIA UR CULT: NORMAL
GLUCOSE BLD MANUAL STRIP-MCNC: 135 MG/DL (ref 74–99)
GLUCOSE BLD MANUAL STRIP-MCNC: 149 MG/DL (ref 74–99)
GLUCOSE BLD MANUAL STRIP-MCNC: 150 MG/DL (ref 74–99)
GLUCOSE BLD MANUAL STRIP-MCNC: 195 MG/DL (ref 74–99)

## 2023-10-18 PROCEDURE — 99221 1ST HOSP IP/OBS SF/LOW 40: CPT | Performed by: INTERNAL MEDICINE

## 2023-10-18 PROCEDURE — 1140000001 HC PRIVATE PSYCH ROOM DAILY

## 2023-10-18 PROCEDURE — 82947 ASSAY GLUCOSE BLOOD QUANT: CPT

## 2023-10-18 PROCEDURE — 99232 SBSQ HOSP IP/OBS MODERATE 35: CPT | Performed by: REGISTERED NURSE

## 2023-10-18 PROCEDURE — 97166 OT EVAL MOD COMPLEX 45 MIN: CPT | Mod: GO

## 2023-10-18 PROCEDURE — 97161 PT EVAL LOW COMPLEX 20 MIN: CPT | Mod: GP

## 2023-10-18 PROCEDURE — 2500000002 HC RX 250 W HCPCS SELF ADMINISTERED DRUGS (ALT 637 FOR MEDICARE OP, ALT 636 FOR OP/ED): Performed by: INTERNAL MEDICINE

## 2023-10-18 PROCEDURE — 96372 THER/PROPH/DIAG INJ SC/IM: CPT | Performed by: INTERNAL MEDICINE

## 2023-10-18 PROCEDURE — 2500000001 HC RX 250 WO HCPCS SELF ADMINISTERED DRUGS (ALT 637 FOR MEDICARE OP): Performed by: INTERNAL MEDICINE

## 2023-10-18 PROCEDURE — 97535 SELF CARE MNGMENT TRAINING: CPT | Mod: GO

## 2023-10-18 PROCEDURE — 2500000004 HC RX 250 GENERAL PHARMACY W/ HCPCS (ALT 636 FOR OP/ED): Performed by: INTERNAL MEDICINE

## 2023-10-18 PROCEDURE — 2500000001 HC RX 250 WO HCPCS SELF ADMINISTERED DRUGS (ALT 637 FOR MEDICARE OP): Performed by: PSYCHIATRY & NEUROLOGY

## 2023-10-18 RX ORDER — INSULIN GLARGINE 100 [IU]/ML
40 INJECTION, SOLUTION SUBCUTANEOUS NIGHTLY
Status: DISCONTINUED | OUTPATIENT
Start: 2023-10-18 | End: 2023-11-09

## 2023-10-18 RX ORDER — DEXTROSE 50 % IN WATER (D50W) INTRAVENOUS SYRINGE
25
Status: DISCONTINUED | OUTPATIENT
Start: 2023-10-18 | End: 2023-11-15 | Stop reason: HOSPADM

## 2023-10-18 RX ORDER — ATORVASTATIN CALCIUM 40 MG/1
40 TABLET, FILM COATED ORAL DAILY
Status: DISCONTINUED | OUTPATIENT
Start: 2023-10-18 | End: 2023-10-18

## 2023-10-18 RX ORDER — ATORVASTATIN CALCIUM 40 MG/1
40 TABLET, FILM COATED ORAL NIGHTLY
Status: DISCONTINUED | OUTPATIENT
Start: 2023-10-18 | End: 2023-11-15 | Stop reason: HOSPADM

## 2023-10-18 RX ORDER — ICOSAPENT ETHYL 1 G/1
1 CAPSULE ORAL
Status: DISCONTINUED | OUTPATIENT
Start: 2023-10-18 | End: 2023-11-15 | Stop reason: HOSPADM

## 2023-10-18 RX ORDER — LOSARTAN POTASSIUM 50 MG/1
100 TABLET ORAL DAILY
Status: DISCONTINUED | OUTPATIENT
Start: 2023-10-18 | End: 2023-11-15 | Stop reason: HOSPADM

## 2023-10-18 RX ORDER — AMLODIPINE BESYLATE 10 MG/1
10 TABLET ORAL DAILY
Status: DISCONTINUED | OUTPATIENT
Start: 2023-10-18 | End: 2023-11-15 | Stop reason: HOSPADM

## 2023-10-18 RX ORDER — INSULIN DEGLUDEC 200 U/ML
40 INJECTION, SOLUTION SUBCUTANEOUS NIGHTLY
Status: DISCONTINUED | OUTPATIENT
Start: 2023-10-18 | End: 2023-10-18

## 2023-10-18 RX ORDER — PANTOPRAZOLE SODIUM 40 MG/1
40 TABLET, DELAYED RELEASE ORAL
Status: DISCONTINUED | OUTPATIENT
Start: 2023-10-18 | End: 2023-11-15 | Stop reason: HOSPADM

## 2023-10-18 RX ORDER — DEXTROSE MONOHYDRATE 100 MG/ML
0.3 INJECTION, SOLUTION INTRAVENOUS ONCE AS NEEDED
Status: DISCONTINUED | OUTPATIENT
Start: 2023-10-18 | End: 2023-11-15 | Stop reason: HOSPADM

## 2023-10-18 RX ORDER — AMLODIPINE BESYLATE 10 MG/1
10 TABLET ORAL DAILY
Status: CANCELLED | OUTPATIENT
Start: 2023-10-18

## 2023-10-18 RX ORDER — CARVEDILOL 12.5 MG/1
25 TABLET ORAL
Status: DISCONTINUED | OUTPATIENT
Start: 2023-10-18 | End: 2023-11-15 | Stop reason: HOSPADM

## 2023-10-18 RX ORDER — ATORVASTATIN CALCIUM 40 MG/1
40 TABLET, FILM COATED ORAL NIGHTLY
Status: CANCELLED | OUTPATIENT
Start: 2023-10-18

## 2023-10-18 RX ORDER — LATANOPROST 50 UG/ML
1 SOLUTION/ DROPS OPHTHALMIC NIGHTLY
Status: DISCONTINUED | OUTPATIENT
Start: 2023-10-18 | End: 2023-11-15 | Stop reason: HOSPADM

## 2023-10-18 RX ORDER — LANOLIN ALCOHOL/MO/W.PET/CERES
400 CREAM (GRAM) TOPICAL DAILY
Status: DISCONTINUED | OUTPATIENT
Start: 2023-10-18 | End: 2023-11-15 | Stop reason: HOSPADM

## 2023-10-18 RX ORDER — INSULIN LISPRO 100 [IU]/ML
3 INJECTION, SOLUTION INTRAVENOUS; SUBCUTANEOUS
Status: DISCONTINUED | OUTPATIENT
Start: 2023-10-18 | End: 2023-10-18

## 2023-10-18 RX ORDER — POTASSIUM CHLORIDE 750 MG/1
10 TABLET, FILM COATED, EXTENDED RELEASE ORAL ONCE
Status: COMPLETED | OUTPATIENT
Start: 2023-10-18 | End: 2023-10-18

## 2023-10-18 RX ADMIN — POTASSIUM CHLORIDE 10 MEQ: 750 TABLET, FILM COATED, EXTENDED RELEASE ORAL at 07:00

## 2023-10-18 RX ADMIN — INSULIN GLARGINE 40 UNITS: 100 INJECTION, SOLUTION SUBCUTANEOUS at 22:20

## 2023-10-18 RX ADMIN — LATANOPROST 1 DROP: 50 SOLUTION OPHTHALMIC at 22:20

## 2023-10-18 RX ADMIN — ICOSAPENT ETHYL 1 G: 1 CAPSULE ORAL at 08:53

## 2023-10-18 RX ADMIN — ICOSAPENT ETHYL 1 G: 1 CAPSULE ORAL at 18:25

## 2023-10-18 RX ADMIN — ATORVASTATIN CALCIUM 40 MG: 40 TABLET, FILM COATED ORAL at 22:20

## 2023-10-18 RX ADMIN — CARVEDILOL 25 MG: 12.5 TABLET, FILM COATED ORAL at 07:00

## 2023-10-18 RX ADMIN — PANTOPRAZOLE SODIUM 40 MG: 40 TABLET, DELAYED RELEASE ORAL at 07:00

## 2023-10-18 RX ADMIN — Medication 400 MG: at 08:52

## 2023-10-18 RX ADMIN — CARVEDILOL 25 MG: 12.5 TABLET, FILM COATED ORAL at 18:25

## 2023-10-18 RX ADMIN — VENLAFAXINE HYDROCHLORIDE 150 MG: 150 CAPSULE, EXTENDED RELEASE ORAL at 08:53

## 2023-10-18 SDOH — SOCIAL STABILITY: SOCIAL INSECURITY: WITHIN THE LAST YEAR, HAVE YOU BEEN AFRAID OF YOUR PARTNER OR EX-PARTNER?: NO

## 2023-10-18 SDOH — HEALTH STABILITY: MENTAL HEALTH

## 2023-10-18 SDOH — SOCIAL STABILITY: SOCIAL INSECURITY: WITHIN THE LAST YEAR, HAVE YOU BEEN HUMILIATED OR EMOTIONALLY ABUSED IN OTHER WAYS BY YOUR PARTNER OR EX-PARTNER?: NO

## 2023-10-18 SDOH — HEALTH STABILITY: MENTAL HEALTH: NON-SPECIFIC ACTIVE SUICIDAL THOUGHTS (PAST 1 MONTH): NO

## 2023-10-18 SDOH — SOCIAL STABILITY: SOCIAL INSECURITY
WITHIN THE LAST YEAR, HAVE YOU BEEN RAPED OR FORCED TO HAVE ANY KIND OF SEXUAL ACTIVITY BY YOUR PARTNER OR EX-PARTNER?: NO

## 2023-10-18 SDOH — HEALTH STABILITY: MENTAL HEALTH: IN THE PAST FEW WEEKS, HAVE YOU WISHED YOU WERE DEAD?: YES

## 2023-10-18 SDOH — HEALTH STABILITY: MENTAL HEALTH: IN THE PAST FEW WEEKS, HAVE YOU FELT THAT YOU OR YOUR FAMILY WOULD BE BETTER OFF IF YOU WERE DEAD?: YES

## 2023-10-18 SDOH — HEALTH STABILITY: MENTAL HEALTH: BEHAVIOR: ORIENTED

## 2023-10-18 SDOH — SOCIAL STABILITY: SOCIAL INSECURITY: FEELS SAFE LIVING IN HOME: OTHER (COMMENT)

## 2023-10-18 SDOH — HEALTH STABILITY: MENTAL HEALTH: HARK TOTAL SCORE: 0

## 2023-10-18 SDOH — HEALTH STABILITY: MENTAL HEALTH: ACTIVE SUICIDAL IDEATION WITH SPECIFIC PLAN AND INTENT (PAST 1 MONTH): NO

## 2023-10-18 SDOH — HEALTH STABILITY: MENTAL HEALTH: ACTIVE SUICIDAL IDEATION WITH SOME INTENT TO ACT, WITHOUT SPECIFIC PLAN (PAST 1 MONTH): NO

## 2023-10-18 SDOH — HEALTH STABILITY: MENTAL HEALTH: MENTAL HEALTH CONDITIONS/ SYMPTOMS: DEPRESSION

## 2023-10-18 SDOH — HEALTH STABILITY: MENTAL HEALTH: SUICIDAL BEHAVIOR (LIFETIME): NO

## 2023-10-18 SDOH — HEALTH STABILITY: MENTAL HEALTH: IN THE PAST WEEK, HAVE YOU BEEN HAVING THOUGHTS ABOUT KILLING YOURSELF?: YES

## 2023-10-18 SDOH — HEALTH STABILITY: MENTAL HEALTH: RECENT CHANGES IN MENTAL STATUS/COGNITIVE FUNCTIONING: NO CHANGES

## 2023-10-18 SDOH — HEALTH STABILITY: MENTAL HEALTH: SUBSTANCE USE: NO AOD USE REPORTED TO LISW-S

## 2023-10-18 SDOH — HEALTH STABILITY: MENTAL HEALTH: PATIENT PERSONAL STRENGTHS: FAITH/SPIRITUALITY;FUTURE/ GOAL ORIENTED;MOTIVATED

## 2023-10-18 SDOH — HEALTH STABILITY: MENTAL HEALTH: EMOTIONAL/ PSCYHOLOGICAL COMMENTS: PT WILL BENEFIT FROM COMMUNITY RESOURCES

## 2023-10-18 SDOH — HEALTH STABILITY: MENTAL HEALTH: ARE YOU HAVING THOUGHTS OF KILLING YOURSELF RIGHT NOW?: NO

## 2023-10-18 SDOH — ECONOMIC STABILITY: HOUSING INSECURITY: POTENTIALLY UNSAFE HOUSING CONDITIONS: OTHER (COMMENT)

## 2023-10-18 SDOH — SOCIAL STABILITY: SOCIAL INSECURITY: RELIGIOUS/CULTURAL FACTORS: PT IS RELIGIOUS AND USES THAT AS A SUPPORT

## 2023-10-18 SDOH — SOCIAL STABILITY: SOCIAL INSECURITY: THOUGHTS OF HARM TO OTHERS: NO

## 2023-10-18 SDOH — HEALTH STABILITY: MENTAL HEALTH: MAJOR CHANGE/ LOSS/ STRESSOR: DEATH;RELOCATION;LIMITED SUPPORT SYSTEM;INADEQUATE SERVICES;HOUSING CONCERNS ILLNESS

## 2023-10-18 SDOH — HEALTH STABILITY: MENTAL HEALTH: WISH TO BE DEAD (PAST 1 MONTH): YES

## 2023-10-18 SDOH — HEALTH STABILITY: MENTAL HEALTH: HAVE YOU EVER TRIED TO KILL YOURSELF?: NO

## 2023-10-18 SDOH — SOCIAL STABILITY: SOCIAL INSECURITY: ASSESSMENT OF VIOLENCE: NONE NOTED

## 2023-10-18 SDOH — ECONOMIC STABILITY: HOUSING INSECURITY: HOME SAFETY: PT IS CURRENTLY HOMELESS

## 2023-10-18 ASSESSMENT — PAIN SCALES - GENERAL
PAINLEVEL_OUTOF10: 0 - NO PAIN

## 2023-10-18 ASSESSMENT — COLUMBIA-SUICIDE SEVERITY RATING SCALE - C-SSRS
2. HAVE YOU ACTUALLY HAD ANY THOUGHTS OF KILLING YOURSELF?: NO
1. SINCE LAST CONTACT, HAVE YOU WISHED YOU WERE DEAD OR WISHED YOU COULD GO TO SLEEP AND NOT WAKE UP?: NO
6. HAVE YOU EVER DONE ANYTHING, STARTED TO DO ANYTHING, OR PREPARED TO DO ANYTHING TO END YOUR LIFE?: NO

## 2023-10-18 ASSESSMENT — COGNITIVE AND FUNCTIONAL STATUS - GENERAL
DRESSING REGULAR LOWER BODY CLOTHING: A LITTLE
DAILY ACTIVITIY SCORE: 21
TOILETING: A LITTLE
STANDING UP FROM CHAIR USING ARMS: A LITTLE
MOVING TO AND FROM BED TO CHAIR: A LITTLE
CLIMB 3 TO 5 STEPS WITH RAILING: A LOT
MOBILITY SCORE: 17
HELP NEEDED FOR BATHING: A LITTLE
TURNING FROM BACK TO SIDE WHILE IN FLAT BAD: A LITTLE
MOVING FROM LYING ON BACK TO SITTING ON SIDE OF FLAT BED WITH BEDRAILS: A LITTLE
WALKING IN HOSPITAL ROOM: A LITTLE

## 2023-10-18 ASSESSMENT — PAIN - FUNCTIONAL ASSESSMENT
PAIN_FUNCTIONAL_ASSESSMENT: 0-10

## 2023-10-18 ASSESSMENT — ACTIVITIES OF DAILY LIVING (ADL)
HOME_MANAGEMENT_TIME_ENTRY: 15
BATHING_ASSISTANCE: MINIMAL
ADL_ASSISTANCE: INDEPENDENT
BATHING: NO ASSISTANCE

## 2023-10-18 NOTE — NURSING NOTE
BIRP NOTE     Problem:  Depression     Behavior:  sleeping  Group Participation: NA  Appetite/Meals: NA       Interventions:  No intervention needed at this time.    Response:  No change     Plan:  Continue to monitor for depression
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
## 2023-10-18 NOTE — SIGNIFICANT EVENT
LISW-S met with pt 1:1 to complete PSA.     10/18/23 1000   Referral Data   Referral Source Physician   Referral Name Edwardo   Referral Reason Psychosocial assessment   County Information   County of Residence Homeless   Patient Information   Primary Caregiver Self   Provides Primary Care For No One   Accompanied by/Relationship No one   Support System Other (Comment)   Lives With Homeless   Cheondoism/Cultural Factors Pt is [employer] and uses that as a support   SW Readmission Information    Readmission within 30 Days No   Home Safety   Feels Safe Living in Home Other (Comment)   Potentially Unsafe Housing Conditions Other (Comment)   Home Safety  Pt is currently homeless   Family Member Substance Use   Family History Substance Use None   Substance Use No AoD use reported to LISW-S   Activities of Daily Living   Functional Status Independent   Assistive Device Walker   Living Arrangement Homeless   Ambulation Minimum assistance   Dressing Independent   Feeding Independent   Prothesis/Equipment Glasses;Dentures   Skin Integrity Intact   Bladder Control Continent   Bowel Control Continent   Bathing/Grooming No assistance   Behavior Oriented   Communication Can write;Talks;Understands speaking;Understands English;Reads   Income Information   Employment Status for Patient   Employment Status Disabled;Retired   Income Source Disability   Current/Previous Occupation Factory Work   Employment/ Finance Comments Pt is on limited income   Emotional/ Psychological   Person Assessed Patient   Affect No Deficits Noted   Behaviors/Mood Calm;Cooperative   Verbal Skills No Deficits Noted   Current Interpersonal Conduct/ Behavior Appropriate to Situation   Mental Health Conditions/ Symptoms Depression   Thought Process Alterations No Deficits Noted   Hallucination Type None   Mental Health Treatment None   Previous Mental Health Treatment None   Emotional/ Pscyhological Comments Pt will benefit from community resources    Cognitive/Perceptual/Developmental   Current Mental Status/Cognitive Functioning No Deficits Noted   Recent Changes in Mental Status/Cognitive Functioning No Changes   Developmental Stage (Eriksson's Stages of Development) Stage 8 (65 years-death/Late Adulthood) Integrity vs Despair   [place] Suicide Severity Rating Scale (Screener/Recent Self-Report)   1. Wish to be Dead (Past 1 Month) Y   2. Non-Specific Active Suicidal Thoughts (Past 1 Month) N   3. Active Suicidal Ideation with any Methods (Not Plan) Without Intent to Act (Past 1 Month) N   4. Active Suicidal Ideation with Some Intent to Act, Without Specific Plan (Past 1 Month) N   5. Active Suicidal Ideation with Specific Plan and Intent (Past 1 Month) N   6. Suicidal Behavior (Lifetime) N   Ask Suicide-Screening Questions   1. In the past few weeks, have you wished you were dead? Y   2. In the past few weeks, have you felt that you or your family would be better off if you were dead? Y   3. In the past week, have you been having thoughts about killing yourself? Y   4. Have you ever tried to kill yourself? N   5. Are you having thoughts of killing yourself right now? N   Calculated Risk Score Potential Risk   HARK Assessment   Within the last year, have you been afraid of your partner or ex-partner? No   Within the last year, have you been humiliated or emotionally abused in other ways by your partner or ex-partner? No   Within the last year, have you been kicked, hit, slapped, or otherwise physically hurt by your partner or ex-partner? No   Within the last year, have you been raped or forced to have any kind of sexual activity by your partner or ex-partner? No   HARK Total Score 0   Violence Risk Assessment   Assessment of Violence None noted   Thoughts of Harm to Others No   Coping/ Stress   Major Change/ Loss/ Stressor Death;Relocation;Limited Support System;Inadequate Services;Housing Concerns Illness   Patient Personal Strengths  Roro/Spirituality;Future/ Goal Oriented;Motivated   Reaction to Health Status Accepting   Understanding of Condition and Treatment Adequate Understanding of Medical Condition   Coping/ Stress, Primary Caregiver/ Support Person   Support Person Major Change/Loss/Stressor None   Legal   Legal Considerations Patient/ Family Ability to Make Healthcare Decisions   Legal Concerns none

## 2023-10-18 NOTE — PROGRESS NOTES
Occupational Therapy    Evaluation    Patient Name: Madalyn Ricci  MRN: [id number]  Today's Date: 10/18/2023       Assessment  IP OT Assessment  OT Assessment: Pt is an 83 year old female who presents to ED with increased depression.  Pt is currently homeless.  Pt presents with decreased muscle strength, balance, and activity tolerance and will benefit from skilled OT intervention to achieve the greatest amount of independence and safety with ADLs.  Prognosis: Good  Barriers to Discharge: Decreased caregiver support, Other (Comment) (no housing)  Evaluation/Treatment Tolerance: Patient tolerated treatment well  End of Session Communication: Bedside nurse  End of Session Patient Position: Up in chair  Plan:  Treatment Interventions: ADL retraining, Functional transfer training, UE strengthening/ROM, Endurance training, Patient/family training, Equipment evaluation/education, Neuromuscular reeducation, Compensatory technique education  OT Frequency: 5 times per week  OT Recommended Transfer Status: Stand by assist, Assistive equipment (Comment) (FWW)    Subjective   Current Problem:  1. Mood disorder (CMS/HCC)          General:  General  Reason for Referral: impaired mobility  Referred By: Dr. Brooke  Past Medical History Relevant to Rehab: HTN, hyperlipidemia, GERD, DM, neuropathy  Patient Position Received: Up in chair  Preferred Learning Style: auditory, verbal  General Comment: pleasant and cooperative with OT  Precautions:  Medical Precautions: Fall precautions       Pain:  Pain Assessment  Pain Assessment: 0-10  Pain Score: 0 - No pain  Pain Location: Back  Pain Orientation: Lower    Objective   Cognition:  Overall Cognitive Status: Within Functional Limits  Orientation Level: Oriented X4           Home Living:  Type of Home: Other (Comment) (patient is homeless)  Lives With: Other (Comment) (unsure of DC plan)  Home Adaptive Equipment: None (pt left shower chair behind)  Home Living Comments: pt is currently  "homeless   Prior Function:  Level of [place]: Independent with ADLs and functional transfers  ADL Assistance: Independent  Homemaking Assistance: Needs assistance  Hand Dominance: Right  Prior Function Comments: Pt was living with daughter prior to admission but will not be returning with daughter post discharge  IADL History:  Meal Prep Responsibility: No (pt reports receiving meals at various soup kitchen locations)  Current License: No (has not driven for 3 years)  Mode of Transportation: Family, Other (Comment) (dial a ride)  Occupation: Retired  Leisure and Hobbies: pt verbalizes \"I'm in survival mode\"  ADL:  Eating Assistance: Independent  Eating Deficit: None  Grooming Assistance: Independent  Grooming Deficit: None  Bathing Assistance: Minimal  Bathing Deficit: Steadying, Verbal cueing, Supervision/safety, Other (Comment) (per clinical judgement; in stance)  UE Dressing Assistance: Stand by  UE Dressing Deficit: Setup  LE Dressing Assistance: Minimal  LE Dressing Deficit: Thread RLE into pants, Thread LLE into pants  Toileting Assistance with Device: Stand by  ADL Comments: pt presents with unsteadiness on feet when in stance requiring UB support  Activity Tolerance:  Endurance: Decreased tolerance for upright activites  Bed Mobility/Transfers: Transfers  Transfer: Yes  Transfer 1  Technique 1: Sit to stand, Stand to sit  Transfer Device 1: Walker  Transfer Level of Assistance 1: Close supervision  Trials/Comments 1: vcs for safe hand placement       IADL's:   Meal Prep Responsibility: No (pt reports receiving meals at various soup kitchen locations)  Current License: No (has not driven for 3 years)  Mode of Transportation: Family, Other (Comment) (dial a ride)  Occupation: Retired  Leisure and Hobbies: pt verbalizes \"I'm in survival mode\"          Strength:  Strength Comments: NANCY LAGUNAx 4/5       Coordination:  Movements are Fluid and Coordinated: Yes   Hand Function:  Hand Function  Gross Grasp: " Functional  Coordination: Functional  Extremities: RUE   RUE : Within Functional Limits and LUE   LUE: Within Functional Limits      Outcome Measures: [facility] Daily Activity  Putting on and taking off regular lower body clothing: A little  Bathing (including washing, rinsing, drying): A little  Putting on and taking off regular upper body clothing: None  Toileting, which includes using toilet, bedpan or urinal: A little  Taking care of personal grooming such as brushing teeth: None  Eating Meals: None  Daily Activity - Total Score: 21      Education Documentation  Handouts, taught by Xochitl Adams OT at 10/18/2023  4:57 PM.  Learner: Patient  Readiness: Acceptance  Method: Explanation, Demonstration  Response: Needs Reinforcement    Body Mechanics, taught by Xochitl Adams OT at 10/18/2023  4:57 PM.  Learner: Patient  Readiness: Acceptance  Method: Explanation, Demonstration  Response: Needs Reinforcement    Precautions, taught by Xochitl Adams OT at 10/18/2023  4:57 PM.  Learner: Patient  Readiness: Acceptance  Method: Explanation, Demonstration  Response: Needs Reinforcement    Home Exercise Program, taught by Xochitl Adams OT at 10/18/2023  4:57 PM.  Learner: Patient  Readiness: Acceptance  Method: Explanation, Demonstration  Response: Needs Reinforcement    ADL Training, taught by Xochitl Adams OT at 10/18/2023  4:57 PM.  Learner: Patient  Readiness: Acceptance  Method: Explanation, Demonstration  Response: Needs Reinforcement    Education Comments  No comments found.      Goals:   Encounter Problems       Encounter Problems (Active)       Balance       LTG - Patient will maintain good dynamic balance to allow for safe mobility with RW. (Progressing)       Start:  10/18/23    Expected End:  11/01/23               Bathing       Patient will perform UB and LB bathing with modified independence level of assistance and with shower chair. (Progressing)       Start:  10/18/23    Expected End:   11/08/23               Dressing Upper Extremities       Patient with complete upper body dressing with independent level of assistance donning and doffing all UE clothes with no adaptive equipment while edge of bed .  (Progressing)       Start:  10/18/23    Expected End:  11/08/23               Dressings Lower Extremities       Patient with complete lower body dressing with modified independent level of assistance donning and doffing all LE clothes  with PRN adaptive equipment while edge of bed .  (Progressing)       Start:  10/18/23    Expected End:  11/08/23               Instrumental Activities of Daily Living       Patient will independently complete basic home making activities to return to independent functioning at home. (Progressing)       Start:  10/18/23    Expected End:  11/08/23               Mobility       LTG - Patient will [id number] ft or greater mod Independent.  (Progressing)       Start:  10/18/23    Expected End:  11/01/23               TRANSFERS       Patient will complete functional transfer to toilet with front wheeled walker with modified independent level of assistance. (Progressing)       Start:  10/18/23    Expected End:  11/08/23               Toileting       Patient will complete toileting including hygiene clothing management/hygiene with modified independent level of assistance and grab bars.  (Progressing)       Start:  10/18/23    Expected End:  11/08/23               Transfers       LTG - Transfer from bed to chair mod Independent (Progressing)       Start:  10/18/23    Expected End:  11/01/23            LTG - Patient will perform bed mobility mod Independent. (Progressing)       Start:  10/18/23    Expected End:  11/01/23            LTG - Patient will transfer sit to and from stand mod Independent.       Start:  10/18/23    Expected End:  11/01/23               Transfers       Pt will transfer in/out of shower with grab bar and modified independent assist.  (Progressing)        Start:  10/18/23    Expected End:  11/08/23

## 2023-10-18 NOTE — PROGRESS NOTES
REHAB Therapy Assessment & Treatment    Patient Name: Madalyn Ricci  MRN: [id number]  Today's Date: 10/18/2023    Recreation Therapy assessment complete on this date: info gathered via pt interview    Activity Assessment:  Initial Assessment  Attention Span: 15-30 Miutes  Cognitive Behavior Status/Orientation: Person, Place, Time  Crisis Triggers: Family/friends  Emotional Concerns/Mood/Affect:  (daughter)  Hearing: Adequate  Memory: Memory intact  Motivation Level: No encouragement needed  Negative Coping Skills: Other (Comment) (unable to assess)  Speech/Communication/Socialization: Understands spoken word, Responds when approached, Appropriate interation  Vision: Glasses    Leisure Survey:  [facility]  Activity Preference: Group, 1:1  Activity Tolerance: Poor 0-15 minutes  At Home ADL Deficits: Other (Comment) (pt is currently homeless)  Barriers to Leisure Participation: Lack of finances/money, Living situation, Pain, No one to participate with, Lack of leisure interests skills  Community Resources: Aware of resources however does not utilize resources  Creative Activities:  (unable to assess)  Following Directions: Able to follow simple commands  Leisure Interests: Unable to identify interests  Living Arrangement: Other (Comment) (pt is currently homeless after being kicked out of daughters home and cannot reside with either of her two other children)  Motivators for Recreation/Leisure Involvement: Other (Comment) (unable to assess)  Outdoor Activities:  (unable to assess)  Passive Games:  (unable to assess)  Patient/Family Education Needs: validation, reassurance, safety awareness, community resources  Patient Strengths: ambulatory, friendly, cooperative, alert and oriented, spirituality  Patient Weakness: limited family support, homeless  Physial Activity:  (unable to assess)  Social/Group Activities: Judaism/Caodaism activities  Solitary Activities: Watch/listen television, Reading,  "Music  Special Hobbies: unable to assess  Spectator Events:  (unable to assess)  Transportation:  (pt reports daughter would sometimes takes her places)  Work/Volunteer: disability  Additional Comments: Pt is a 83 year old female here for SI. Pt densies SI or AVH. Pt is alert and orientedx3 and situation. Pt displays calm and cooperative for assessment questions. Pt reports she was previously living with her daughter and they got into an arguement and she is now currently homeless. Pt recently moved to the area from [place]. PT unable to answer most questions related to leisure interests/hobbies as she seems perseverated on talking about daughter after each attempt to redirect back to leisure interests. Pt is able to reports she enjoys music, watching some television, attending Confucianist and reading the bible. PT reports she is eating and sleep \"Okay\". PT was educated on benefits of Rec. Tx and attending therapeutic groups. Will encourage pt to attend daily groups of choice.              "

## 2023-10-18 NOTE — PROGRESS NOTES
Patient is an 82yo female admitted for Mood Disorder.  Case discussed in morning team.  She [id number] per nursing last night thoug patient said she had some difficulty sleeping.  She is eating well. She reports her mood as better and smiled a little as she said this.  She denies SI/HI today.    Vitals signs reviewed     Past Medical History  She has a past medical history of Diabetes mellitus (CMS/HCC) and Hypertension.    Past Psychiatric History:   Previous therapy: no  Previous psychiatric treatment and medication trials: no  Previous psychiatric hospitalizations: no  Previous diagnoses: no  Previous suicide attempts: no  History of violence: no  Currently in treatment with N/A.  Depression screening was performed with standardized tool: Yes, Depression    Surgical History  She has a past surgical history that includes Lung lobectomy (10/13/2015); Hysterectomy (10/13/2015); Cholecystectomy (10/13/2015); Appendectomy (10/13/2015); Hemicolectomy (10/13/2015); Cataract extraction (10/13/2015); and CT guided imaging for needle placement (2/4/2015).     Social History  She reports that she has never smoked. She has never used smokeless tobacco. No history on file for alcohol use and drug use.     Allergies  Patient has no known allergies.    Review of Systems    Psychiatric ROS - Adult  Anxiety: Mild  Depression: negative  Delirium: negative  Psychosis: negative  Margret: negative  Safety Issues: none  Psychiatric ROS Comment: The patient states that she is not suicidal, she did  and denies AVH. The patient states that she is not depressed and the only concern now is a caring and safe environment for her to live on.    Physical Exam      Mental Status Exam  General: NAD  Appearance: Hospital attire  Attitude: Accepting  Behavior: tearful  Motor Activity: in wheelchair  Speech: Verbose  Mood: Sad, Labile  Affect: more open  Thought Process: Loose associations   Thought Content: no delusions eleicted, denies SI/HI  Thought  "Perception: denies AH/VH/paranoia  Cognition: fair  Insight: fair  Judgement: fair    Psychiatric Risk Assessment  Violence Risk Assessment: none  Acute Risk of Harm to Others is Considered: low   Suicide Risk Assessment: age > 65 yrs old, , chronic medical illness, history of trauma or abuse, and living alone or lack of social support  Protective Factors against Suicide: fear of suicide and Tenriism affiliation/spirituality  Acute Risk of Harm to Self is Considered: low    Last Recorded Vitals  Blood pressure 110/60, pulse 77, temperature 36.6 °C (97.9 °F), temperature source Oral, resp. rate 18, height 1.626 m (5' 4.02\"), SpO2 96 %.    Relevant Results    Current Facility-Administered Medications:     acetaminophen (Tylenol) tablet 650 mg, 650 mg, oral, q4h PRN, Navin Brooke DO    alum-mag hydroxide-simeth (Mylanta) 200-200-20 mg/5 mL oral suspension 30 mL, 30 mL, oral, q6h PRN, Navin Brooke DO    amLODIPine (Norvasc) tablet 10 mg, 10 mg, oral, Daily, Osiel Forman MD    atorvastatin (Lipitor) tablet 40 mg, 40 mg, oral, Nightly, Osiel Forman MD    carvedilol (Coreg) tablet 25 mg, 25 mg, oral, BID with meals, Osiel Forman MD, 25 mg at 10/18/23 0700    dextrose 10 % in water (D10W) infusion, 0.3 g/kg/hr, intravenous, Once PRN, Osiel Forman MD    dextrose 50 % injection 25 g, 25 g, intravenous, q15 min PRN, Osiel Forman MD    diphenhydrAMINE (BENADryl) capsule 50 mg, 50 mg, oral, q6h PRN **OR** diphenhydrAMINE (BENADryl) injection 50 mg, 50 mg, intramuscular, Once PRN, Navin Brooke DO    glucagon (Glucagen) injection 1 mg, 1 mg, intramuscular, q15 min PRN, Osiel Forman MD    icosapent ethyL (Vascepa) capsule 1 g, 1 g, oral, BID with meals, Osiel Forman MD, 1 g at 10/18/23 0853    insulin glargine (Lantus) injection 40 Units, 40 Units, subcutaneous, Nightly, Osiel Forman MD    latanoprost (Xalatan) 0.005 % ophthalmic solution 1 drop, 1 " drop, Both Eyes, Nightly, Osiel Forman MD    losartan (Cozaar) tablet 100 mg, 100 mg, oral, Daily, Osiel Forman MD    magnesium hydroxide (Milk of Magnesia) 400 mg/5 mL suspension 30 mL, 30 mL, oral, Daily PRN, Navin Brooke DO    magnesium oxide (Mag-Ox) tablet 400 mg, 400 mg, oral, Daily, Osiel Forman MD, 400 mg at 10/18/23 0852    OLANZapine (ZyPREXA) tablet 5 mg, 5 mg, oral, q6h PRN **OR** OLANZapine (ZyPREXA) injection 5 mg, 5 mg, intramuscular, q6h PRN, Navin Brooke DO    pantoprazole (ProtoNix) EC tablet 40 mg, 40 mg, oral, Daily before breakfast, Osiel Forman MD, 40 mg at 10/18/23 0700    traZODone (Desyrel) tablet 25 mg, 25 mg, oral, Nightly PRN, Navin Brooke DO    venlafaxine XR (Effexor-XR) 24 hr capsule 150 mg, 150 mg, oral, Daily, Navin Brooke DO, 150 mg at 10/18/23 0853      Assessment/Plan   Principal Problem:    Mood disorder (CMS/HCC)  Active Problems:    Hyperlipidemia    GERD (gastroesophageal reflux disease)    Type 2 diabetes mellitus without complication, with long-term current use of insulin (CMS/HCC)    Primary hypertension    The patient denies any side effect from the medication  Continue medication regimen already established. Patient says when asked specifically about effexor that she began taking it in 2020- her PCP ordered it-says she has been taking it every day.  Appreciate social work arranging placement in assisted living      I spent 60 minutes in the professional and overall care of this patient.      Medication Consent  Medication Consent: risks, benefits, side effects reviewed for all ordered meds    Apolonia Loving, APRN-CNS

## 2023-10-18 NOTE — GROUP NOTE
Group Topic: Cognitive Focus   Group Date: 10/18/2023  Start Time: 1400  End Time: 1445  Facilitators: JENNIFER Kim   Department: [facility] REHABTH VIRTUAL    Number of Participants: 11   Group Focus: problem solving  Treatment Modality: Other: Recreation Therapy  Interventions utilized were problem solving  Purpose: other: Memory Recall.     Name: Madalyn Ricci YOB: 1940   MR: [id number]      Facilitator: Recreational Therapist  Level of Participation: minimal  Quality of Participation: attentive  Interactions with others: appropriate  Mood/Affect: flat  Triggers (if applicable): N/A  Cognition: concrete  Progress: Minimal  Comments: pt problem is SI   Plan: continue with services

## 2023-10-18 NOTE — CARE PLAN
Problem: Bathing  Goal: Patient will perform UB and LB bathing with modified independence level of assistance and with shower chair.  Outcome: Progressing     Problem: Dressing Lower Extremities  Goal: Patient with complete lower body dressing with modified independent level of assistance donning and doffing all LE clothes  with PRN adaptive equipment while edge of bed .   Outcome: Progressing     Problem: Dressing Upper Extremities  Goal: Patient with complete upper body dressing with independent level of assistance donning and doffing all UE clothes with no adaptive equipment while edge of bed .   Outcome: Progressing     Problem: Instrumental Activities of Daily Living  Goal: Patient will independently complete basic home making activities to return to independent functioning at home.  Outcome: Progressing     Problem: Toileting  Goal: Patient will complete toileting including hygiene clothing management/hygiene with modified independent level of assistance and grab bars.   Outcome: Progressing     Problem: Transfers  Goal: Pt will transfer in/out of shower with grab bar and modified independent assist.   Outcome: Progressing     Problem: Transfers   Goal: Patient will complete functional transfer to toilet with front wheeled walker with modified independent level of assistance.  Outcome: Progressing

## 2023-10-18 NOTE — GROUP NOTE
Group Topic: Music Therapy   Group Date: 10/18/2023  Start Time: 1000  End Time: 1100  Facilitators: ROOSEVELT Kelley; Mandi Payan   Department: [facility] EXPRESSIVE THER VIRTUAL    Number of Participants: 9   Group Focus: music therapy  Treatment Modality: Music Therapy  Interventions Utilized were: active music engagement, empathic listening/validating emotions, passive music engagement, sharing/discussion, and support      Name: Madalyn Ricci YOB: 1940   MR: [id number]      Level of Participation: active  Quality of Participation: attentive, cooperative, and engaged  Interactions with others: appropriate, gave feedback, and offered helpful suggestions  Mood/Affect: appropriate, brightens with interaction, and positive  Cognition, Pre Treatment: attentive  Cognition, Post Treatment: attentive and goal directed  Progress: Moderate  Plan: continue with services

## 2023-10-18 NOTE — PROGRESS NOTES
Physical Therapy    Physical Therapy Evaluation & Treatment    Patient Name: Madalyn Ricci  MRN: [id number]  Today's Date: 10/18/2023        Assessment/Plan   PT Assessment  PT Assessment Results: Decreased strength, Decreased endurance, Impaired balance, Decreased mobility, Decreased safety awareness, Impaired sensation, Obesity  Rehab Prognosis: Good  Evaluation/Treatment Tolerance: Patient tolerated treatment well  Medical Staff Made Aware: Yes  Strengths: Ability to acquire knowledge, Attitude of self  End of Session Communication: Bedside nurse  Assessment Comment: 83 year old female who presents with B LE weakness, impaired balance, and decreased functional activity tolerance which limits safe mobility. Patient has suffered a functional decline and requires acute PT to address deficits.  End of Session Patient Position: Up in chair (in group room with music therapy)  IP OR SWING BED PT PLAN  Inpatient or Swing Bed: Inpatient  PT Plan  Treatment/Interventions: Bed mobility, Transfer training, Gait training, Balance training, Neuromuscular re-education, Strengthening, Endurance training, Therapeutic exercise, Therapeutic activity, Postural re-education  PT Plan: Skilled PT  PT Frequency: 4 times per week  PT Discharge Recommendations: Moderate intensity level of continued care      Subjective     General Visit Information:  General  Reason for Referral: impaired mobility  Referred By: Dr. Brooke  Past Medical History Relevant to Rehab: HTN, hyperlipidemia, GERD, DM, neuropathy  Missed Visit: Yes  Missed Visit Reason: Other (Comment) (attempted PT evaluation, however, patient is meeting with behavioral health NP at this time.)  Patient Position Received: Up in chair  Preferred Learning Style: auditory, verbal  General Comment: pleasant, cooperative, motivated for PT  Home Living:  Home Living  Type of Home: Other (Comment) (patient is homeless)  Lives With: Other (Comment) (unsure at this time for plan for  "discharge)  Prior Level of Function:  Prior Function Per Pt/Caregiver Report  Level of [place]: Independent with homemaking with ambulation, Independent with ADLs and functional transfers  Prior Function Comments: Patient was living with daughter prior to admit, but will not be post discharge.  Precautions:  Precautions  Medical Precautions: Fall precautions  Vital Signs:       Objective   Pain:  Pain Assessment  Pain Assessment: 0-10  Pain Score: 0 - No pain  Cognition:  Cognition  Overall Cognitive Status: Within Functional Limits  Orientation Level: Oriented X4    General Assessments:                Sensation  Light Touch: Partial deficits in the LLE, Partial deficits in the RLE  Sensation Comment: Reports feet feel like \"walking on sponges\"    Coordination  Movements are Fluid and Coordinated: Yes    Postural Control  Posture Comment: age related changes, rounded shoulders, morbidly obese    Static Sitting Balance  Static Sitting-Level of Assistance: Independent  Dynamic Sitting Balance  Dynamic Sitting-Balance Support: Feet supported  Dynamic Sitting-Comments: Good    Static Standing Balance  Static Standing-Balance Support: Bilateral upper extremity supported  Static Standing-Level of Assistance: Contact guard  Static Standing-Comment/Number of Minutes: with RW for ambulation  Dynamic Standing Balance  Dynamic Standing-Balance Support: Bilateral upper extremity supported  Dynamic Standing-Comments: with RW for ambulation, min A x 1 for turns  Functional Assessments:  Bed Mobility  Bed Mobility: Yes  Bed Mobility 1  Bed Mobility 1: Supine to sitting, Sitting to supine  Level of Assistance 1: Close supervision  Bed Mobility Comments 1: requires increased time and effort    Transfers  Transfer: Yes  Transfer 1  Technique 1: Sit to stand, Stand to sit  Transfer Device 1: Walker  Transfer Level of Assistance 1: Close supervision  Trials/Comments 1: v.c.'s for hand placement, safe technique, to slow pace, moves " "(quickly and has episode of loss of balance)    Ambulation/Gait Training  Ambulation/Gait Training Performed: Yes  Ambulation/Gait Training 1  Surface 1: Level tile  Device 1: Rolling walker  Assistance 1: Minimum assistance  Quality of Gait 1: Wide base of support  Comments/Distance (ft) 1: 50 ft x 2, wide base of support, \"soft\" L knee, fatigues with distance  Extremity/Trunk Assessments:  RLE   RLE : Within Functional Limits (for ROM)  LLE   LLE : Within Functional Limits (for ROM)  Treatments:  Ambulation/Gait Training  Ambulation/Gait Training Performed: Yes  Ambulation/Gait Training 1  Surface 1: Level tile  Device 1: Rolling walker  Assistance 1: Minimum assistance  Quality of Gait 1: Wide base of support  Comments/Distance (ft) 1: 50 ft x 2, wide base of support, \"soft\" L knee, fatigues with distance  Outcome Measures:  [facility] Basic Mobility  Turning from your back to your side while in a flat bed without using bedrails: A little  Moving from lying on your back to sitting on the side of a flat bed without using bedrails: A little  Moving to and from bed to chair (including a wheelchair): A little  Standing up from a chair using your arms (e.g. wheelchair or bedside chair): A little  To walk in hospital room: A little  Climbing 3-5 steps with railing: A lot  Basic Mobility - Total Score: 17    Encounter Problems       Encounter Problems (Active)       Balance       LTG - Patient will maintain good dynamic balance to allow for safe mobility with RW. (Progressing)       Start:  10/18/23    Expected End:  11/01/23               Mobility       LTG - Patient will [id number] ft or greater mod Independent.  (Progressing)       Start:  10/18/23    Expected End:  11/01/23               Transfers       LTG - Transfer from bed to chair mod Independent (Progressing)       Start:  10/18/23    Expected End:  11/01/23            LTG - Patient will perform bed mobility mod Independent. (Progressing)       Start:  10/18/23    " Expected End:  11/01/23            LTG - Patient will transfer sit to and from stand mod Independent.       Start:  10/18/23    Expected End:  11/01/23                   Education Documentation  Body Mechanics, taught by Yahaira Winters PT at 10/18/2023 11:03 AM.  Learner: Patient  Readiness: Eager  Method: Explanation  Response: Needs Reinforcement    Mobility Training, taught by Yahaira Winters PT at 10/18/2023 11:03 AM.  Learner: Patient  Readiness: Eager  Method: Explanation  Response: Needs Reinforcement    Education Comments  No comments found.

## 2023-10-18 NOTE — CARE PLAN
Problem: Balance  Goal: LTG - Patient will maintain good dynamic balance to allow for safe mobility with RW.  Outcome: Progressing     Problem: Mobility  Goal: LTG - Patient will [id number] ft or greater mod Independent.   Outcome: Progressing     Problem: Transfers  Goal: LTG - Transfer from bed to chair mod Independent  Outcome: Progressing  Goal: LTG - Patient will perform bed mobility mod Independent.  Outcome: Progressing

## 2023-10-19 LAB
GLUCOSE BLD MANUAL STRIP-MCNC: 149 MG/DL (ref 74–99)
GLUCOSE BLD MANUAL STRIP-MCNC: 161 MG/DL (ref 74–99)
GLUCOSE BLD MANUAL STRIP-MCNC: 173 MG/DL (ref 74–99)
GLUCOSE BLD MANUAL STRIP-MCNC: 194 MG/DL (ref 74–99)

## 2023-10-19 PROCEDURE — 2500000001 HC RX 250 WO HCPCS SELF ADMINISTERED DRUGS (ALT 637 FOR MEDICARE OP): Performed by: PSYCHIATRY & NEUROLOGY

## 2023-10-19 PROCEDURE — 99232 SBSQ HOSP IP/OBS MODERATE 35: CPT | Performed by: INTERNAL MEDICINE

## 2023-10-19 PROCEDURE — 2500000004 HC RX 250 GENERAL PHARMACY W/ HCPCS (ALT 636 FOR OP/ED): Performed by: INTERNAL MEDICINE

## 2023-10-19 PROCEDURE — 97535 SELF CARE MNGMENT TRAINING: CPT | Mod: GO,CO

## 2023-10-19 PROCEDURE — 2500000001 HC RX 250 WO HCPCS SELF ADMINISTERED DRUGS (ALT 637 FOR MEDICARE OP): Performed by: INTERNAL MEDICINE

## 2023-10-19 PROCEDURE — 1140000001 HC PRIVATE PSYCH ROOM DAILY

## 2023-10-19 PROCEDURE — 82947 ASSAY GLUCOSE BLOOD QUANT: CPT

## 2023-10-19 PROCEDURE — 97530 THERAPEUTIC ACTIVITIES: CPT | Mod: GO,CO

## 2023-10-19 PROCEDURE — 99232 SBSQ HOSP IP/OBS MODERATE 35: CPT | Performed by: REGISTERED NURSE

## 2023-10-19 RX ADMIN — ATORVASTATIN CALCIUM 40 MG: 40 TABLET, FILM COATED ORAL at 21:18

## 2023-10-19 RX ADMIN — INSULIN GLARGINE 40 UNITS: 100 INJECTION, SOLUTION SUBCUTANEOUS at 21:13

## 2023-10-19 RX ADMIN — CARVEDILOL 25 MG: 12.5 TABLET, FILM COATED ORAL at 08:20

## 2023-10-19 RX ADMIN — LOSARTAN POTASSIUM 100 MG: 50 TABLET, FILM COATED ORAL at 08:21

## 2023-10-19 RX ADMIN — ICOSAPENT ETHYL 1 G: 1 CAPSULE ORAL at 08:20

## 2023-10-19 RX ADMIN — CARVEDILOL 25 MG: 12.5 TABLET, FILM COATED ORAL at 17:19

## 2023-10-19 RX ADMIN — VENLAFAXINE HYDROCHLORIDE 150 MG: 150 CAPSULE, EXTENDED RELEASE ORAL at 08:21

## 2023-10-19 RX ADMIN — Medication 400 MG: at 08:21

## 2023-10-19 RX ADMIN — LATANOPROST 1 DROP: 50 SOLUTION OPHTHALMIC at 21:13

## 2023-10-19 RX ADMIN — ICOSAPENT ETHYL 1 G: 1 CAPSULE ORAL at 17:19

## 2023-10-19 RX ADMIN — PANTOPRAZOLE SODIUM 40 MG: 40 TABLET, DELAYED RELEASE ORAL at 06:17

## 2023-10-19 RX ADMIN — AMLODIPINE BESYLATE 10 MG: 10 TABLET ORAL at 08:21

## 2023-10-19 ASSESSMENT — PAIN SCALES - GENERAL
PAINLEVEL_OUTOF10: 0 - NO PAIN

## 2023-10-19 ASSESSMENT — COLUMBIA-SUICIDE SEVERITY RATING SCALE - C-SSRS
2. HAVE YOU ACTUALLY HAD ANY THOUGHTS OF KILLING YOURSELF?: NO
2. HAVE YOU ACTUALLY HAD ANY THOUGHTS OF KILLING YOURSELF?: NO
6. HAVE YOU EVER DONE ANYTHING, STARTED TO DO ANYTHING, OR PREPARED TO DO ANYTHING TO END YOUR LIFE?: NO
1. SINCE LAST CONTACT, HAVE YOU WISHED YOU WERE DEAD OR WISHED YOU COULD GO TO SLEEP AND NOT WAKE UP?: NO
1. SINCE LAST CONTACT, HAVE YOU WISHED YOU WERE DEAD OR WISHED YOU COULD GO TO SLEEP AND NOT WAKE UP?: NO
2. HAVE YOU ACTUALLY HAD ANY THOUGHTS OF KILLING YOURSELF?: NO
6. HAVE YOU EVER DONE ANYTHING, STARTED TO DO ANYTHING, OR PREPARED TO DO ANYTHING TO END YOUR LIFE?: NO
6. HAVE YOU EVER DONE ANYTHING, STARTED TO DO ANYTHING, OR PREPARED TO DO ANYTHING TO END YOUR LIFE?: NO
1. SINCE LAST CONTACT, HAVE YOU WISHED YOU WERE DEAD OR WISHED YOU COULD GO TO SLEEP AND NOT WAKE UP?: NO

## 2023-10-19 ASSESSMENT — PAIN - FUNCTIONAL ASSESSMENT
PAIN_FUNCTIONAL_ASSESSMENT: 0-10

## 2023-10-19 ASSESSMENT — COGNITIVE AND FUNCTIONAL STATUS - GENERAL
DRESSING REGULAR LOWER BODY CLOTHING: A LITTLE
HELP NEEDED FOR BATHING: A LITTLE
DAILY ACTIVITIY SCORE: 22

## 2023-10-19 ASSESSMENT — ACTIVITIES OF DAILY LIVING (ADL): HOME_MANAGEMENT_TIME_ENTRY: 15

## 2023-10-19 NOTE — NURSING NOTE
"WISAM NOTE     Problem:  SI/Depression     Behavior:  Pt was observed sitting in the group room at the start of the shift, she had a quick snack and then took herself to her room. She fell asleep rather quickly but woke calmly for her Hs medications. Pt is pleasant and cooperative. Denies SI currently. Pt says the reason why she is here is because \"I have no place to go\"  Group Participation: n/a  Appetite/Meals: hs snack provided    Interventions:  Hs medications given whole with water  Assessment completed    Response:  Pt continues to rest in bed throughout the night, no s/s of distress noted     Plan:  Continue to monitor and assist. Maintain q15 minutes rounds for safety.    "

## 2023-10-19 NOTE — GROUP NOTE
Group Topic: Other   Group Date: 10/19/2023  Start Time: 1515  End Time: 1600  Facilitators: JENNIFER Gramajo   Department: [facility] REHABTH VIRTUAL    Number of Participants: 7   Group Focus: reality orientation, reminiscence, and self-esteem  Treatment Modality: Other: Recreation Therapy  Interventions utilized were exploration, mental fitness, and reminiscence  Purpose: feelings and self-worth    Name: Madalyn Ricci YOB: 1940   MR: [id number]      Facilitator: Recreational Therapist  Level of Participation: active  Quality of Participation: appropriate/pleasant, attentive, cooperative, and engaged  Interactions with others: appropriate  Mood/Affect: appropriate  Triggers (if applicable): n/a  Cognition: coherent/clear  Progress: Moderate  Comments: pt problem is depressed mood.    Plan: continue with services

## 2023-10-19 NOTE — GROUP NOTE
Group Topic: Reminiscence   Group Date: 10/19/2023  Start Time: 1000  End Time: 1050  Facilitators: JENNIFER Gramajo   Department: [facility] REHAB[facility] VIRTUAL    Number of Participants: 6   Group Focus: other Lifestories  Treatment Modality: Other: Recreation Therapy  Interventions utilized were exploration, mental fitness, and reminiscence  Purpose: feelings and communication skills    Name: Madalyn Ricci YOB: 1940   MR: [id number]      Facilitator: Recreational Therapist  Level of Participation: active  Quality of Participation: appropriate/pleasant, attentive, cooperative, and engaged  Interactions with others: appropriate  Mood/Affect: appropriate, brightens with interaction, and positive  Triggers (if applicable): n/a  Cognition: coherent/clear  Progress: Moderate  Comments: pt problem is depressed mood.  Pt joins group with little encouragement, able to focus and engage with little cueing.  Smiles often and makes good eye contact.    Plan: continue with services

## 2023-10-19 NOTE — PROGRESS NOTES
Occupational Therapy    OT Treatment    Patient Name: Madalyn Ricci  MRN: [id number]  Today's Date: 10/19/2023  Time Calculation  Start Time: 1434  Stop Time: 1508  Time Calculation (min): 34 min         Assessment:  Medical Staff Made Aware: Yes  End of Session Communication:  (OTR/L)  End of Session Patient Position: Up in chair (at NSG station)  Medical Staff Made Aware: Yes  Plan:  Treatment Interventions: ADL retraining, Functional transfer training, Equipment evaluation/education, Compensatory technique education  OT Frequency: 5 times per week  OT Recommended Transfer Status: Stand by assist  Treatment Interventions: ADL retraining, Functional transfer training, Equipment evaluation/education, Compensatory technique education    Subjective   General:  OT Received On: 10/19/23  Reason for Referral: impaired mobility  Referred By: Dr. Brooke  Past Medical History Relevant to Rehab: HTN, hyperlipidemia, GERD, DM, neuropathy  Missed Visit: No  Missed Visit Reason: Other (Comment)  Patient Position Received: Up in chair  Preferred Learning Style: auditory, verbal  General Comment: Pt agreeable to skilled therapeutic intervention  Vital Signs:     Pain:  Pain Assessment  Pain Assessment: 0-10  Pain Score: 0 - No pain    Objective    Activities of Daily Living:                UE Dressing  UE Dressing Level of Assistance: Independent  UE Dressing Where Assessed: Edge of bed  UE Dressing Comments: Pt efficient with taks to doff/don pullover garment    LE Dressing  LE Dressing: Yes  Pants Level of Assistance: Modified independent  Sock Level of Assistance: Independent  LE Dressing Where Assessed: Edge of bed  LE Dressing Comments: Pt doff/don socks use LH reacher thread pants B feet    Toileting  Toileting Level of Assistance: Modified independent  Where Assessed: Toilet  Toileting Comments: Pt adjusting clothng prior/after/hygiene  Functional Standing Tolerance:  Time: 3 minuites  Activity: grooming  Functional  Standing Tolerance Comments: unilateral hand support  Bed Mobility/Transfers: Bed Mobility  Bed Mobility: Yes  Bed Mobility 1  Bed Mobility 1: Supine to sitting, Sitting to supine  Level of Assistance 1: Distant supervision  Bed Mobility Comments 1: increased effort    Transfer 1  Transfer From 1: Sit to  Transfer to 1: Stand  Technique 1: Sit to stand  Transfer Device 1: Walker  Transfer Level of Assistance 1: Distant supervision  Trials/Comments 1: vc hand placement    Toilet Transfers  Toilet Transfer From: Rolling walker  Toilet Transfer Type: To and from  Toilet Transfer to: Standard toilet  Toilet Transfer Technique: Ambulating  Toilet Transfers: Modified independence  Toilet Transfers Comments: use grab bar in transition       Therapy/Activity:      Therapeutic Activity  Therapeutic Activity Performed: Yes (functional mobility 20' x 2 RW MOD IND)    Outcome Measures:[facility] Daily Activity  Putting on and taking off regular lower body clothing: A little  Bathing (including washing, rinsing, drying): A little  Putting on and taking off regular upper body clothing: None  Toileting, which includes using toilet, bedpan or urinal: None  Taking care of personal grooming such as brushing teeth: None  Eating Meals: None  Daily Activity - Total Score: 22        Education Documentation  No documentation found.  Education Comments  No comments found.        OP EDUCATION:  Education  Individual(s) Educated: Patient  Education Provided: Risk and benefits of OT discussed with patient or other, POC discussed and agreed upon, Fall precautons  Patient/Caregiver Demonstrated Understanding: yes  Patient Response to Education: Patient/Caregiver Verbalized Understanding of Information    Goals:  Encounter Problems       Encounter Problems (Active)       Balance       LTG - Patient will maintain good dynamic balance to allow for safe mobility with RW. (Progressing)       Start:  10/18/23    Expected End:  11/01/23                Bathing       Patient will perform UB and LB bathing with modified independence level of assistance and with shower chair. (Progressing)       Start:  10/18/23    Expected End:  11/08/23               Dressing Upper Extremities       Patient with complete upper body dressing with independent level of assistance donning and doffing all UE clothes with no adaptive equipment while edge of bed .  (Progressing)       Start:  10/18/23    Expected End:  11/08/23               Dressings Lower Extremities       Patient with complete lower body dressing with modified independent level of assistance donning and doffing all LE clothes  with PRN adaptive equipment while edge of bed .  (Progressing)       Start:  10/18/23    Expected End:  11/08/23               Instrumental Activities of Daily Living       Patient will independently complete basic home making activities to return to independent functioning at home. (Progressing)       Start:  10/18/23    Expected End:  11/08/23               Mobility       LTG - Patient will [id number] ft or greater mod Independent.  (Progressing)       Start:  10/18/23    Expected End:  11/01/23               TRANSFERS       Patient will complete functional transfer to toilet with front wheeled walker with modified independent level of assistance. (Progressing)       Start:  10/18/23    Expected End:  11/08/23               Toileting       Patient will complete toileting including hygiene clothing management/hygiene with modified independent level of assistance and grab bars.  (Progressing)       Start:  10/18/23    Expected End:  11/08/23               Transfers       LTG - Transfer from bed to chair mod Independent (Progressing)       Start:  10/18/23    Expected End:  11/01/23            LTG - Patient will perform bed mobility mod Independent. (Progressing)       Start:  10/18/23    Expected End:  11/01/23            LTG - Patient will transfer sit to and from stand mod Independent.        Start:  10/18/23    Expected End:  11/01/23               Transfers       Pt will transfer in/out of shower with grab bar and modified independent assist.  (Progressing)       Start:  10/18/23    Expected End:  11/08/23

## 2023-10-19 NOTE — CONSULTS
Nutrition Assessment Note    Reason for Hospital Admission:  Madalyn Ricci is a 83 y.o. female who is admitted for SI, unable to care for self. Spouse noted to have passed in 2020. Currently homeless - plan for AL placement.    Nutrition Assessment:  Reason for Assessment  Reason for Assessment: Dietitian discretion (MST 2)     has a past medical history of Diabetes mellitus (CMS/HCC) and Hypertension.     No Known Allergies     Lab Results   Component Value Date    WBC 6.4 10/16/2023    HGB 14.1 10/16/2023    HCT 44.1 10/16/2023     10/16/2023    CHOL 138 06/26/2020    TRIG 291 (H) 06/26/2020    HDL 36 (L) 06/26/2020    ALT 11 10/16/2023    AST 16 10/16/2023     10/16/2023    K 3.7 10/16/2023     (H) 10/16/2023    CREATININE 1.20 10/16/2023    BUN 31 (H) 10/16/2023    CO2 26 10/16/2023    TSH 0.66 10/09/2020    INR 1.0 04/06/2018    HGBA1C 6.4 (H) 12/12/2020    ALBUR 55 (H) 06/26/2020       Current Facility-Administered Medications:     acetaminophen (Tylenol) tablet 650 mg, 650 mg, oral, q4h PRN, Navin Brooke DO    alum-mag hydroxide-simeth (Mylanta) 200-200-20 mg/5 mL oral suspension 30 mL, 30 mL, oral, q6h PRN, Navin Brooke DO    amLODIPine (Norvasc) tablet 10 mg, 10 mg, oral, Daily, Osiel Forman MD, 10 mg at 10/19/23 0821    atorvastatin (Lipitor) tablet 40 mg, 40 mg, oral, Nightly, Osiel Forman MD, 40 mg at 10/18/23 2220    carvedilol (Coreg) tablet 25 mg, 25 mg, oral, BID with meals, Osiel Forman MD, 25 mg at 10/19/23 0820    dextrose 10 % in water (D10W) infusion, 0.3 g/kg/hr, intravenous, Once PRN, Osiel Forman MD    dextrose 50 % injection 25 g, 25 g, intravenous, q15 min PRN, Osiel Forman MD    diphenhydrAMINE (BENADryl) capsule 50 mg, 50 mg, oral, q6h PRN **OR** diphenhydrAMINE (BENADryl) injection 50 mg, 50 mg, intramuscular, Once PRN, Navin Brooke DO    glucagon (Glucagen) injection 1 mg, 1 mg, intramuscular, q15 min PRN,  "Osiel Forman MD    icosapent ethyL (Vascepa) capsule 1 g, 1 g, oral, BID with meals, Osiel Forman MD, 1 g at 10/19/23 0820    insulin glargine (Lantus) injection 40 Units, 40 Units, subcutaneous, Nightly, Osiel Forman MD, 40 Units at 10/18/23 2220    latanoprost (Xalatan) 0.005 % ophthalmic solution 1 drop, 1 drop, Both Eyes, Nightly, Osiel Forman MD, 1 drop at 10/18/23 2220    losartan (Cozaar) tablet 100 mg, 100 mg, oral, Daily, Osiel Forman MD, 100 mg at 10/19/23 0821    magnesium hydroxide (Milk of Magnesia) 400 mg/5 mL suspension 30 mL, 30 mL, oral, Daily PRN, Navin Brooke DO    magnesium oxide (Mag-Ox) tablet 400 mg, 400 mg, oral, Daily, Osiel Forman MD, 400 mg at 10/19/23 0821    OLANZapine (ZyPREXA) tablet 5 mg, 5 mg, oral, q6h PRN **OR** OLANZapine (ZyPREXA) injection 5 mg, 5 mg, intramuscular, q6h PRN, Navin Brooke DO    pantoprazole (ProtoNix) EC tablet 40 mg, 40 mg, oral, Daily before breakfast, Osiel Forman MD, 40 mg at 10/19/23 0617    traZODone (Desyrel) tablet 25 mg, 25 mg, oral, Nightly PRN, Navin Brooke DO    venlafaxine XR (Effexor-XR) 24 hr capsule 150 mg, 150 mg, oral, Daily, Navin Brooke DO, 150 mg at 10/19/23 0821      Dietary Orders (From admission, onward)       Start     Ordered    10/17/23 1405  Adult diet Carb Controlled; 60 gram carb/meal, 30 gram Carb evening snack; 2 - 3 gm Sodium, 70 gm fat  Diet effective now        Question Answer Comment   Diet type Carb Controlled    Carb diet selection: 60 gram carb/meal, 30 gram Carb evening snack    Other restriction(s): 2 - 3 gm Sodium    Other restriction(s): 70 gm fat        10/17/23 1409                   History:  Food and Nutrient History  Energy Intake: Good > 75 %    Anthropometrics:  Ht: 162.6 cm (5' 4.02\"), Wt: 127 kg (278 lb 15.9 oz), BMI: 47.87    Weight Change  Weight History / % Weight Change: unknown wt hx. wt of 279# noted from ED visit on 10/9/23. no wt " changes noted since that time.    IBW/kg (Dietitian Calculated): 54.55 kg     Adjusted Body Weight (kg): 72.73 kg    Estimated Energy Needs  Total Energy Estimated Needs (kCal): 1815 kCal  Total Estimated Energy Need per Day (kCal/kg): 25 kCal/kg  Method for Estimating Needs: adjusted wt    Estimated Protein Needs  Total Protein Estimated Needs (g): 73 g  Total Protein Estimated Needs (g/kg): 1 g/kg  Method for Estimating Needs: adjusted wt    Estimated Fluid Needs  Total Fluid Estimated Needs (mL): 1815 mL  Total Fluid Estimated Needs (mL/kg): 1 mL/kg  Method for Estimating Needs: adjusted wt    Nutrition Focused Physical Findings: deferred   Physical Findings (Nutrition Deficiency/Toxicity)  Skin:  (redness 10/19 @ 0300)    Nutrition Diagnosis:  Malnutrition Diagnosis  Patient has Malnutrition Diagnosis: No    Patient has Nutrition Diagnosis: No    Nutrition Interventions/Recommendations: None at this time.    Education Documentation  No documentation found.      Nutrition Monitoring/Evaluation: None at this time.    RD Recommendations: None at this time.      Follow Up  Time Spent (min): 20 minutes  Last Date of Nutrition Visit: 10/19/23  Nutrition Follow-Up Needed?: 7-10 days  Follow up Comment: 10/30/23

## 2023-10-19 NOTE — NURSING NOTE
WISAM NOTE     Problem:  SI, depression      Behavior:  Patient is pleasant, calm and cooperative for assessment and medication administration. Makes needs known. Patient is sitting in the hallway near the nurses station socializing with peers. Interacts appropriately. Patient denies SI.   Group Participation: Attends both groups  Appetite/Meals: 100-100-100       Interventions:  Medications administered per orders.     Response:  Medication compliant.      Plan:  Maintain q 15 minute rounds for patient safety. Will continue to monitor behaviors. Continue current plan of care.

## 2023-10-19 NOTE — PROGRESS NOTES
Madalyn Ricci is a 83 y.o. female on day 2 of admission presenting with Mood disorder (CMS/HCC).      Patient is an 84yo female admitted for Mood Disorder.    Case discussed in morning team.    Vitals signs reviewed  The patient appears to be alert and oriented, knowing the current place and time. She is calm and engaged during the interview, responding to questions comfortably. The patient reports having slept well for over eight hours and feeling safe in the current environment. She denies experiencing any auditory or visual hallucinations, as well as any thoughts of self-harm or harming others. The patient expresses that their primary concern is finding a safe place where they can receive proper care. She discussed her family, stating that she loves them but has reached a point where they can no longer live together. The patient expresses willingness and acceptance to moving to an assisted living facility.     Additionally, the patient reports that she is eating all of her meals and participating in group therapy. She states that she has not experienced any side effects from her medications. The patient mentions that she easily gets exhausted when she walks, but does not complain of any pain.    Past Medical History  She has a past medical history of Diabetes mellitus (CMS/HCC) and Hypertension.    Past Psychiatric History:   Previous therapy: no  Previous psychiatric treatment and medication trials: no  Previous psychiatric hospitalizations: no  Previous diagnoses: no  Previous suicide attempts: no  History of violence: no  Currently in treatment with N/A.  Depression screening was performed with standardized tool: Yes, Depression    Surgical History  She has a past surgical history that includes Lung lobectomy (10/13/2015); Hysterectomy (10/13/2015); Cholecystectomy (10/13/2015); Appendectomy (10/13/2015); Hemicolectomy (10/13/2015); Cataract extraction (10/13/2015); and CT guided imaging for needle placement  "(2/4/2015).     Social History  She reports that she has never smoked. She has never used smokeless tobacco. No history on file for alcohol use and drug use.     Allergies  Patient has no known allergies.    Review of Systems    Psychiatric ROS - Adult  Anxiety: Mild  Depression: negative  Delirium: negative  Psychosis: negative  Margret: negative  Safety Issues: none  Psychiatric ROS Comment: The patient states that she is not suicidal, she did  and denies AVH. The patient states that she is not depressed and the only concern now is a caring and safe environment for her to live on.    Physical Exam      Mental Status Exam  General: NAD  Appearance: Hospital attire  Attitude: Accepting  Behavior: tearful  Motor Activity: in wheelchair  Speech: Verbose  Mood: Sad, Labile  Affect: more open  Thought Process: Loose associations   Thought Content: no delusions eleicted, denies SI/HI  Thought Perception: denies AH/VH/paranoia  Cognition: fair  Insight: fair  Judgement: fair    Psychiatric Risk Assessment  Violence Risk Assessment: none  Acute Risk of Harm to Others is Considered: low   Suicide Risk Assessment: age > 65 yrs old, , chronic medical illness, history of trauma or abuse, and living alone or lack of social support  Protective Factors against Suicide: fear of suicide and [employer] affiliation/spirituality  Acute Risk of Harm to Self is Considered: low    Last Recorded Vitals  Blood pressure (!) 128/93, pulse 84, temperature 36.1 °C (97 °F), temperature source Temporal, resp. rate 17, height 1.626 m (5' 4.02\"), weight 127 kg (278 lb 15.9 oz), SpO2 99 %.    Relevant Results      Current Facility-Administered Medications:     acetaminophen (Tylenol) tablet 650 mg, 650 mg, oral, q4h PRN, Navin Brooke DO    alum-mag hydroxide-simeth (Mylanta) 200-200-20 mg/5 mL oral suspension 30 mL, 30 mL, oral, q6h PRN, Navin Brooke DO    amLODIPine (Norvasc) tablet 10 mg, 10 mg, oral, Daily, Osiel Forman, " MD, 10 mg at 10/19/23 0821    atorvastatin (Lipitor) tablet 40 mg, 40 mg, oral, Nightly, Osiel Forman MD, 40 mg at 10/18/23 2220    carvedilol (Coreg) tablet 25 mg, 25 mg, oral, BID with meals, Osiel Forman MD, 25 mg at 10/19/23 0820    dextrose 10 % in water (D10W) infusion, 0.3 g/kg/hr, intravenous, Once PRN, Osiel Forman MD    dextrose 50 % injection 25 g, 25 g, intravenous, q15 min PRN, Osiel Forman MD    diphenhydrAMINE (BENADryl) capsule 50 mg, 50 mg, oral, q6h PRN **OR** diphenhydrAMINE (BENADryl) injection 50 mg, 50 mg, intramuscular, Once PRN, Navin Brooke DO    glucagon (Glucagen) injection 1 mg, 1 mg, intramuscular, q15 min PRN, Osiel Forman MD    icosapent ethyL (Vascepa) capsule 1 g, 1 g, oral, BID with meals, Osiel Forman MD, 1 g at 10/19/23 0820    insulin glargine (Lantus) injection 40 Units, 40 Units, subcutaneous, Nightly, Osiel Forman MD, 40 Units at 10/18/23 2220    latanoprost (Xalatan) 0.005 % ophthalmic solution 1 drop, 1 drop, Both Eyes, Nightly, Osiel Forman MD, 1 drop at 10/18/23 2220    losartan (Cozaar) tablet 100 mg, 100 mg, oral, Daily, Osiel Forman MD, 100 mg at 10/19/23 0821    magnesium hydroxide (Milk of Magnesia) 400 mg/5 mL suspension 30 mL, 30 mL, oral, Daily PRN, Navin Brooke DO    magnesium oxide (Mag-Ox) tablet 400 mg, 400 mg, oral, Daily, Osiel Forman MD, 400 mg at 10/19/23 0821    OLANZapine (ZyPREXA) tablet 5 mg, 5 mg, oral, q6h PRN **OR** OLANZapine (ZyPREXA) injection 5 mg, 5 mg, intramuscular, q6h PRN, Navin Brooke DO    pantoprazole (ProtoNix) EC tablet 40 mg, 40 mg, oral, Daily before breakfast, Osiel Forman MD, 40 mg at 10/19/23 0617    traZODone (Desyrel) tablet 25 mg, 25 mg, oral, Nightly PRN, Navin Brooke DO    venlafaxine XR (Effexor-XR) 24 hr capsule 150 mg, 150 mg, oral, Daily, Navin Brooke DO, 150 mg at 10/19/23 0821      Assessment/Plan   Principal  Problem:    Mood disorder (CMS/[facility])  Active Problems:    Hyperlipidemia    GERD (gastroesophageal reflux disease)    Type 2 diabetes mellitus without complication, with long-term current use of insulin (CMS/[facility])    Primary hypertension    Continue medication regimen already established. Patient says when asked specifically about effexor that she began taking it in 2020- her PCP ordered it-says she has been taking it every day.  Continue daily rounds.   Continue to encourage scheduled medication.  Encourage participation on group therapy.  Appreciate social work arranging placement in assisted living          Medication Consent  Medication Consent: risks, benefits, side effects reviewed for all ordered medications    I spent 40  minutes in the professional and overall care of this patient.      EMILIANA Granados-CNP  I reviewed this note and have seen this patient.

## 2023-10-20 LAB
GLUCOSE BLD MANUAL STRIP-MCNC: 112 MG/DL (ref 74–99)
GLUCOSE BLD MANUAL STRIP-MCNC: 139 MG/DL (ref 74–99)
GLUCOSE BLD MANUAL STRIP-MCNC: 153 MG/DL (ref 74–99)

## 2023-10-20 PROCEDURE — 99232 SBSQ HOSP IP/OBS MODERATE 35: CPT | Performed by: REGISTERED NURSE

## 2023-10-20 PROCEDURE — 2500000004 HC RX 250 GENERAL PHARMACY W/ HCPCS (ALT 636 FOR OP/ED): Performed by: INTERNAL MEDICINE

## 2023-10-20 PROCEDURE — 2500000001 HC RX 250 WO HCPCS SELF ADMINISTERED DRUGS (ALT 637 FOR MEDICARE OP): Performed by: PSYCHIATRY & NEUROLOGY

## 2023-10-20 PROCEDURE — 99232 SBSQ HOSP IP/OBS MODERATE 35: CPT | Performed by: INTERNAL MEDICINE

## 2023-10-20 PROCEDURE — 1140000001 HC PRIVATE PSYCH ROOM DAILY

## 2023-10-20 PROCEDURE — 82947 ASSAY GLUCOSE BLOOD QUANT: CPT

## 2023-10-20 PROCEDURE — 2500000001 HC RX 250 WO HCPCS SELF ADMINISTERED DRUGS (ALT 637 FOR MEDICARE OP): Performed by: INTERNAL MEDICINE

## 2023-10-20 RX ADMIN — PANTOPRAZOLE SODIUM 40 MG: 40 TABLET, DELAYED RELEASE ORAL at 06:02

## 2023-10-20 RX ADMIN — ICOSAPENT ETHYL 1 G: 1 CAPSULE ORAL at 16:58

## 2023-10-20 RX ADMIN — VENLAFAXINE HYDROCHLORIDE 150 MG: 150 CAPSULE, EXTENDED RELEASE ORAL at 08:34

## 2023-10-20 RX ADMIN — LATANOPROST 1 DROP: 50 SOLUTION OPHTHALMIC at 20:16

## 2023-10-20 RX ADMIN — CARVEDILOL 25 MG: 12.5 TABLET, FILM COATED ORAL at 16:58

## 2023-10-20 RX ADMIN — INSULIN GLARGINE 40 UNITS: 100 INJECTION, SOLUTION SUBCUTANEOUS at 20:16

## 2023-10-20 RX ADMIN — ICOSAPENT ETHYL 1 G: 1 CAPSULE ORAL at 08:33

## 2023-10-20 RX ADMIN — LOSARTAN POTASSIUM 100 MG: 50 TABLET, FILM COATED ORAL at 08:34

## 2023-10-20 RX ADMIN — AMLODIPINE BESYLATE 10 MG: 10 TABLET ORAL at 08:34

## 2023-10-20 RX ADMIN — ATORVASTATIN CALCIUM 40 MG: 40 TABLET, FILM COATED ORAL at 20:16

## 2023-10-20 RX ADMIN — Medication 400 MG: at 08:34

## 2023-10-20 RX ADMIN — CARVEDILOL 25 MG: 12.5 TABLET, FILM COATED ORAL at 08:33

## 2023-10-20 ASSESSMENT — COLUMBIA-SUICIDE SEVERITY RATING SCALE - C-SSRS
6. HAVE YOU EVER DONE ANYTHING, STARTED TO DO ANYTHING, OR PREPARED TO DO ANYTHING TO END YOUR LIFE?: NO
6. HAVE YOU EVER DONE ANYTHING, STARTED TO DO ANYTHING, OR PREPARED TO DO ANYTHING TO END YOUR LIFE?: NO
1. SINCE LAST CONTACT, HAVE YOU WISHED YOU WERE DEAD OR WISHED YOU COULD GO TO SLEEP AND NOT WAKE UP?: NO
1. SINCE LAST CONTACT, HAVE YOU WISHED YOU WERE DEAD OR WISHED YOU COULD GO TO SLEEP AND NOT WAKE UP?: NO
2. HAVE YOU ACTUALLY HAD ANY THOUGHTS OF KILLING YOURSELF?: NO
2. HAVE YOU ACTUALLY HAD ANY THOUGHTS OF KILLING YOURSELF?: NO

## 2023-10-20 ASSESSMENT — PAIN - FUNCTIONAL ASSESSMENT
PAIN_FUNCTIONAL_ASSESSMENT: 0-10
PAIN_FUNCTIONAL_ASSESSMENT: 0-10

## 2023-10-20 ASSESSMENT — PAIN SCALES - GENERAL
PAINLEVEL_OUTOF10: 0 - NO PAIN
PAINLEVEL_OUTOF10: 0 - NO PAIN

## 2023-10-20 NOTE — GROUP NOTE
Group Topic: Music Therapy   Group Date: 10/20/2023  Start Time: 1000  End Time: 1100  Facilitators: ROOSEVELT Kelley   Department: [facility] EXPRESSIVE THER VIRTUAL    Number of Participants: 7   Group Focus: music therapy  Treatment Modality: Music Therapy  Interventions Utilized were: active music engagement, education/instruction, passive music engagement, reminiscence, and sharing/discussion      Name: Madalyn Ricci YOB: 1940   MR: [id number]      Level of Participation: active  Quality of Participation: appropriate/pleasant, attentive, cooperative, and engaged  Interactions with others: appropriate, gave feedback, and supportive  Mood/Affect: appropriate, bright, and positive  Cognition, Pre Treatment: attentive  Cognition, Post Treatment: attentive and goal directed  Progress: Moderate  Plan: continue with services

## 2023-10-20 NOTE — GROUP NOTE
Group Topic: Other   Group Date: 10/20/2023  Start Time: 1500  End Time: 1600  Facilitators: JENNIFER Gramajo   Department: [facility] REHABTH VIRTUAL    Number of Participants: 8   Group Focus: other Music memories  Treatment Modality: Other: Recreation Therapy  Interventions utilized were exploration, mental fitness, and reminiscence  Purpose: feelings    Name: Madalyn Ricci YOB: 1940   MR: [id number]      Facilitator: Recreational Therapist  Level of Participation: active  Quality of Participation: appropriate/pleasant, attentive, cooperative, and engaged  Interactions with others: appropriate  Mood/Affect: appropriate  Triggers (if applicable): n/a  Cognition: coherent/clear  Progress: Significant  Comments: pt problem is depressed mood.  Plan: continue with services

## 2023-10-20 NOTE — PROGRESS NOTES
Madalyn Ricci is a 83 y.o. female on day 3 of admission presenting with Mood disorder (CMS/HCC).      Patient is an 82yo female admitted for Mood Disorder.    Case discussed in morning team.    Vitals signs reviewed  Patient case was discussed in morning team.  She slept 8hs appetite is good, mood is goos. Taking meds as ordered. No prns required. She is attending groups.  Social work is looking into discharge plan. APS saw patient yesterday, apparently her daughter has moved to [place] but no longer has access to patient finances.    Past Medical History  She has a past medical history of Diabetes mellitus (CMS/HCC) and Hypertension.    Past Psychiatric History:   Previous therapy: no  Previous psychiatric treatment and medication trials: no  Previous psychiatric hospitalizations: no  Previous diagnoses: no  Previous suicide attempts: no  History of violence: no  Currently in treatment with N/A.  Depression screening was performed with standardized tool: Yes, Depression    Surgical History  She has a past surgical history that includes Lung lobectomy (10/13/2015); Hysterectomy (10/13/2015); Cholecystectomy (10/13/2015); Appendectomy (10/13/2015); Hemicolectomy (10/13/2015); Cataract extraction (10/13/2015); and CT guided imaging for needle placement (2/4/2015).     Social History  She reports that she has never smoked. She has never used smokeless tobacco. No history on file for alcohol use and drug use.     Allergies  Patient has no known allergies.    Review of Systems    Psychiatric ROS - Adult  Anxiety: Mild  Depression: negative  Delirium: negative  Psychosis: negative  Margret: negative  Safety Issues: none  Psychiatric ROS Comment: The patient states that she is not suicidal, she did  and denies AVH. The patient states that she is not depressed and the only concern now is a caring and safe environment for her to live on.    Physical Exam      Mental Status Exam  General: NAD  Appearance: Hospital  "attire  Attitude: Accepting  Behavior: tearful  Motor Activity: in wheelchair  Speech: Verbose  Mood: Sad, Labile  Affect: more open  Thought Process: Loose associations   Thought Content: no delusions eleicted, denies SI/HI  Thought Perception: denies AH/VH/paranoia  Cognition: fair  Insight: fair  Judgement: fair    Psychiatric Risk Assessment  Violence Risk Assessment: none  Acute Risk of Harm to Others is Considered: low   Suicide Risk Assessment: age > 65 yrs old, , chronic medical illness, history of trauma or abuse, and living alone or lack of social support  Protective Factors against Suicide: fear of suicide and [employer] affiliation/spirituality  Acute Risk of Harm to Self is Considered: low    Last Recorded Vitals  Blood pressure (!) 128/93, pulse 84, temperature 36.1 °C (97 °F), temperature source Temporal, resp. rate 17, height 1.626 m (5' 4.02\"), weight 127 kg (278 lb 15.9 oz), SpO2 99 %.    Relevant Results      Current Facility-Administered Medications:     acetaminophen (Tylenol) tablet 650 mg, 650 mg, oral, q4h PRN, Navin Brooke DO    alum-mag hydroxide-simeth (Mylanta) 200-200-20 mg/5 mL oral suspension 30 mL, 30 mL, oral, q6h PRN, Navin Brooke DO    amLODIPine (Norvasc) tablet 10 mg, 10 mg, oral, Daily, Osiel Forman MD, 10 mg at 10/20/23 0834    atorvastatin (Lipitor) tablet 40 mg, 40 mg, oral, Nightly, Osiel Forman MD, 40 mg at 10/19/23 2118    carvedilol (Coreg) tablet 25 mg, 25 mg, oral, BID with meals, Osiel Forman MD, 25 mg at 10/20/23 0833    dextrose 10 % in water (D10W) infusion, 0.3 g/kg/hr, intravenous, Once PRN, Osiel Forman MD    dextrose 50 % injection 25 g, 25 g, intravenous, q15 min PRN, Osiel Forman MD    diphenhydrAMINE (BENADryl) capsule 50 mg, 50 mg, oral, q6h PRN **OR** diphenhydrAMINE (BENADryl) injection 50 mg, 50 mg, intramuscular, Once PRN, Navin Brooke,     glucagon (Glucagen) injection 1 mg, 1 mg, " intramuscular, q15 min PRN, Osiel Forman MD    icosapent ethyL (Vascepa) capsule 1 g, 1 g, oral, BID with meals, Osiel Forman MD, 1 g at 10/20/23 0833    insulin glargine (Lantus) injection 40 Units, 40 Units, subcutaneous, Nightly, Osiel Forman MD, 40 Units at 10/19/23 2113    latanoprost (Xalatan) 0.005 % ophthalmic solution 1 drop, 1 drop, Both Eyes, Nightly, Osiel Forman MD, 1 drop at 10/19/23 2113    losartan (Cozaar) tablet 100 mg, 100 mg, oral, Daily, Osiel Forman MD, 100 mg at 10/20/23 0834    magnesium hydroxide (Milk of Magnesia) 400 mg/5 mL suspension 30 mL, 30 mL, oral, Daily PRN, Navin Brooke DO    magnesium oxide (Mag-Ox) tablet 400 mg, 400 mg, oral, Daily, Osiel Forman MD, 400 mg at 10/20/23 0834    OLANZapine (ZyPREXA) tablet 5 mg, 5 mg, oral, q6h PRN **OR** OLANZapine (ZyPREXA) injection 5 mg, 5 mg, intramuscular, q6h PRN, Navin Brooke DO    pantoprazole (ProtoNix) EC tablet 40 mg, 40 mg, oral, Daily before breakfast, Osiel Forman MD, 40 mg at 10/20/23 0602    traZODone (Desyrel) tablet 25 mg, 25 mg, oral, Nightly PRN, Navin Brooke DO    venlafaxine XR (Effexor-XR) 24 hr capsule 150 mg, 150 mg, oral, Daily, Navin Brooke DO, 150 mg at 10/20/23 0834      Assessment/Plan   Principal Problem:    Mood disorder (CMS/HCC)  Active Problems:    Hyperlipidemia    GERD (gastroesophageal reflux disease)    Type 2 diabetes mellitus without complication, with long-term current use of insulin (CMS/HCC)    Primary hypertension    Continue meds as ordered.   Continue daily rounds.   Continue to encourage scheduled medication.  Encourage participation on group therapy.  Appreciate social work arranging placement in assisted living          Medication Consent  Medication Consent: risks, benefits, side effects reviewed for all ordered medications    I spent 40  minutes in the professional and overall care of this patient.      Roberto Valle,  EMILIANA-CNP  I reviewed this note and have seen this patient.Madalyn Ricci is a 83 y.o. female on day 3 of admission presenting with Mood disorder (CMS/HCC).

## 2023-10-20 NOTE — GROUP NOTE
Group Topic: Other   Group Date: 10/20/2023  Start Time: 1100  End Time: 1120  Facilitators: JENNIFER Gramajo   Department: [facility] REHABTH VIRTUAL    Number of Participants: 4   Group Focus: other Music is...  Treatment Modality: Other: Recreation Therapy  Interventions utilized were exploration and mental fitness  Purpose: feelings and self-care    Name: Madalyn Ricci YOB: 1940   MR: [id number]      Facilitator: Recreational Therapist  Level of Participation: active  Quality of Participation: appropriate/pleasant, attentive, cooperative, and engaged  Interactions with others: appropriate  Mood/Affect: appropriate  Triggers (if applicable): n/a  Cognition: coherent/clear  Progress: Moderate  Comments: pt problem is SI.    Plan: continue with services

## 2023-10-20 NOTE — NURSING NOTE
BIRP NOTE     Problem:  SI, depression     Behavior:  Patient is pleasant and cooperative for assessment and medication administration. Patient denies SI and depression. Makes needs known. Patient interacts appropriately with staff and peers. No s/s of distress noted.   Group Participation: Patient attends both am and pm groups.   Appetite/Meals: 100-100-100       Interventions:  Medications administered per MAR.     Response:  Medication compliant.      Plan:  Maintain q 15 minute rounds for patient safety. Will continue to monitor. Continue current plan of care.

## 2023-10-20 NOTE — PROGRESS NOTES
REHAB Therapy Assessment & Treatment    Patient Name: Madalyn Ricci  MRN: [id number]  Today's Date: 10/20/2023              Recreation note : pt is alert and oriented x3 with some poor insight/judgement.  Has attended groups of choice since admission and has displayed pleasant, cooperative and appropriate affect, mood and behaviors.  Pt expresses frustrations will her living situation but denies SI at this time.  Pt is eating well and sleeping well.  Will continue to encourage pt to attend groups of choice daily.

## 2023-10-20 NOTE — NURSING NOTE
BIRP NOTE     Problem:  Depression     Behavior:  Pt awake in bed. Pleasant, smiling upon approach. Compliant with meds, cooperative with care. Pt denies depression and SI this shift.  Group Participation: n/a  Appetite/Meals: Declined evening snack       Interventions:  1:1 provided. Evening meds given per orders. Encouraged to use call light for needs. Walker within reach.    Response:  Pt resting in bed. No s/s of distress noted.     Plan:  Will continue to monitor behaviors and effectiveness of interventions while maintaining pt safety.

## 2023-10-20 NOTE — PROGRESS NOTES
"FREDERIC-S called [facility] to schedule follow up assessment for today. LISW-S left VM with . LISW-S spoke with [facility] and they confirmed that the assisted living facility is [facility] and would request documentation once we get closer to discharge date.     FREDERIC-S called granddaughter to discuss pts current treatment. Breana is supportive of pt's hospitalization and did confirm that pt's checks will be sent to her in the mail in the beginning of November. FREDERIC-S will follow up with Breana regarding pt's progress and discharge plan. Breana is supportive and understanding of current hospitalization.  "

## 2023-10-21 LAB
GLUCOSE BLD MANUAL STRIP-MCNC: 101 MG/DL (ref 74–99)
GLUCOSE BLD MANUAL STRIP-MCNC: 127 MG/DL (ref 74–99)
GLUCOSE BLD MANUAL STRIP-MCNC: 128 MG/DL (ref 74–99)
GLUCOSE BLD MANUAL STRIP-MCNC: 144 MG/DL (ref 74–99)
PCP UR-MCNC: <10 NG/ML

## 2023-10-21 PROCEDURE — 2500000001 HC RX 250 WO HCPCS SELF ADMINISTERED DRUGS (ALT 637 FOR MEDICARE OP): Performed by: INTERNAL MEDICINE

## 2023-10-21 PROCEDURE — 2500000001 HC RX 250 WO HCPCS SELF ADMINISTERED DRUGS (ALT 637 FOR MEDICARE OP): Performed by: PSYCHIATRY & NEUROLOGY

## 2023-10-21 PROCEDURE — 1140000001 HC PRIVATE PSYCH ROOM DAILY

## 2023-10-21 PROCEDURE — 82947 ASSAY GLUCOSE BLOOD QUANT: CPT

## 2023-10-21 PROCEDURE — 2500000004 HC RX 250 GENERAL PHARMACY W/ HCPCS (ALT 636 FOR OP/ED): Performed by: INTERNAL MEDICINE

## 2023-10-21 RX ADMIN — ACETAMINOPHEN 650 MG: 325 TABLET ORAL at 09:10

## 2023-10-21 RX ADMIN — LOSARTAN POTASSIUM 100 MG: 50 TABLET, FILM COATED ORAL at 09:09

## 2023-10-21 RX ADMIN — ICOSAPENT ETHYL 1 G: 1 CAPSULE ORAL at 17:39

## 2023-10-21 RX ADMIN — INSULIN GLARGINE 40 UNITS: 100 INJECTION, SOLUTION SUBCUTANEOUS at 20:16

## 2023-10-21 RX ADMIN — AMLODIPINE BESYLATE 10 MG: 10 TABLET ORAL at 09:08

## 2023-10-21 RX ADMIN — CARVEDILOL 25 MG: 12.5 TABLET, FILM COATED ORAL at 09:08

## 2023-10-21 RX ADMIN — Medication 400 MG: at 09:09

## 2023-10-21 RX ADMIN — CARVEDILOL 25 MG: 12.5 TABLET, FILM COATED ORAL at 17:39

## 2023-10-21 RX ADMIN — ATORVASTATIN CALCIUM 40 MG: 40 TABLET, FILM COATED ORAL at 20:16

## 2023-10-21 RX ADMIN — ICOSAPENT ETHYL 1 G: 1 CAPSULE ORAL at 09:10

## 2023-10-21 RX ADMIN — LATANOPROST 1 DROP: 50 SOLUTION OPHTHALMIC at 20:16

## 2023-10-21 RX ADMIN — VENLAFAXINE HYDROCHLORIDE 150 MG: 150 CAPSULE, EXTENDED RELEASE ORAL at 09:09

## 2023-10-21 RX ADMIN — PANTOPRAZOLE SODIUM 40 MG: 40 TABLET, DELAYED RELEASE ORAL at 09:07

## 2023-10-21 ASSESSMENT — PAIN SCALES - GENERAL
PAINLEVEL_OUTOF10: 0 - NO PAIN
PAINLEVEL_OUTOF10: 3
PAINLEVEL_OUTOF10: 0 - NO PAIN

## 2023-10-21 ASSESSMENT — PAIN - FUNCTIONAL ASSESSMENT
PAIN_FUNCTIONAL_ASSESSMENT: 0-10
PAIN_FUNCTIONAL_ASSESSMENT: 0-10

## 2023-10-21 NOTE — CARE PLAN
Problem: Bathing  Goal: Patient will perform UB and LB bathing with modified independence level of assistance and with shower chair.  Outcome: Adequate for Discharge     Problem: Instrumental Activities of Daily Living  Goal: Patient will independently complete basic home making activities to return to independent functioning at home.  Outcome: Adequate for Discharge     Problem: Transfers  Goal: Pt will transfer in/out of shower with grab bar and modified independent assist.   Outcome: Adequate for Discharge     Problem: Dressings Lower Extremities  Goal: Patient with complete lower body dressing with modified independent level of assistance donning and doffing all LE clothes  with PRN adaptive equipment while edge of bed .   Outcome: Met     Problem: Dressing Upper Extremities  Goal: Patient with complete upper body dressing with independent level of assistance donning and doffing all UE clothes with no adaptive equipment while edge of bed .   Outcome: Met     Problem: Toileting  Goal: Patient will complete toileting including hygiene clothing management/hygiene with modified independent level of assistance and grab bars.   Outcome: Met     Problem: TRANSFERS  Goal: Patient will complete functional transfer to toilet with front wheeled walker with modified independent level of assistance.  Outcome: Met

## 2023-10-21 NOTE — PROGRESS NOTES
[facility]: [facility]  PROGRESS NOTE      Madalyn Ricci is a 83 y.o. female that is being seen  today for follow-up at [facility].  No chief complaint on file..  Subjective   Patient is being seen for follow-up of [facility].  Patient's blood pressure has been better controlled.  Blood sugars have been stable.  No recent falls or episodes of agitation.  Patient is usually in the wheelchair since she has difficulty with walking.  Denies any other symptoms.      ROS  Negative for fever or chills  Negative for sore throat, ear pain, nasal discharge  Negative for cough, shortness of breath or wheezing  Negative for chest pain, palpitations, swelling of legs  Negative for abdominal pain, constipation, diarrhea, blood in the stools  Negative for urinary complaints  Negative for headache, dizziness, weakness or numbness  Negative for joint pain.  Positive for difficulty in walking  Positive for anxiety  All other systems reviewed and were negative   Vitals:    10/19/23 0500   BP: (!) 128/93   Pulse: 84   Resp: 17   Temp: 36.1 °C (97 °F)   SpO2: 99%      Vitals:    10/18/23 1749   Weight: 127 kg (278 lb 15.9 oz)     Body mass index is 47.86 kg/m².  Physical Exam  Constitutional: Patient does not appear to be in any acute distress  Head and Face: NCAT  Eyes: Normal external exam, EOMI  ENT: Normal external inspection of ears and nose. Oropharynx normal.  Cardiovascular: RRR, S1/S2, no murmurs, rubs, or gallops, radial pulses +2, no edema of extremities  Pulmonary: CTAB, no respiratory distress.  Abdomen: +BS, soft, non-tender, nondistended, no guarding or rebound, no masses noted  MSK: No joint swelling, normal movements of all extremities. Range of motion- normal.  Skin- No lesions, contusions, or erythema.  Peripheral puslses palpable bilaterally 2+  Neuro: AAO X3, Cranial nerves 2-12 grossly intact,DTR 2+ in all 4 limbs   Psychiatric: Judgment intact. Appropriate mood and behavior    Diagnostic  Results   Lab Results   Component Value Date    GLUCOSE 199 (H) 10/16/2023    CALCIUM 9.3 10/16/2023     10/16/2023    K 3.7 10/16/2023    CO2 26 10/16/2023     (H) 10/16/2023    BUN 31 (H) 10/16/2023    CREATININE 1.20 10/16/2023     Lab Results   Component Value Date    ALT 11 10/16/2023    AST 16 10/16/2023    ALKPHOS 74 10/16/2023    BILITOT 0.3 10/16/2023     Lab Results   Component Value Date    WBC 6.4 10/16/2023    HGB 14.1 10/16/2023    HCT 44.1 10/16/2023    MCV 90 10/16/2023     10/16/2023     Lab Results   Component Value Date    CHOL 138 06/26/2020    CHOL 142 09/20/2019    CHOL 131 (L) 03/05/2019     Lab Results   Component Value Date    HDL 36 (L) 06/26/2020    HDL 36 (L) 09/20/2019    HDL 37 (L) 03/05/2019     Lab Results   Component Value Date    LDLCALC 44 (L) 06/26/2020    LDLCALC 36 (L) 09/20/2019    LDLCALC 49 (L) 03/05/2019     Lab Results   Component Value Date    TRIG 291 (H) 06/26/2020    TRIG 350 (H) 09/20/2019    TRIG 224 (H) 03/05/2019     Lab Results   Component Value Date    HGBA1C 6.4 (H) 12/12/2020     Other labs not included in the list above were reviewed either before or during this encounter.    History    Past Medical History:   Diagnosis Date    Diabetes mellitus (CMS/HCC)     Hypertension      Past Surgical History:   Procedure Laterality Date    APPENDECTOMY  10/13/2015    Appendectomy    CATARACT EXTRACTION  10/13/2015    Cataract Surgery    CHOLECYSTECTOMY  10/13/2015    Cholecystectomy    CT GUIDED IMAGING FOR NEEDLE PLACEMENT  2/4/2015    CT GUIDED IMAGING FOR NEEDLE PLACEMENT [facility]    HEMICOLECTOMY  10/13/2015    Hemicolectomy    HYSTERECTOMY  10/13/2015    Hysterectomy    LUNG LOBECTOMY  10/13/2015    Lung Lobectomy     No family history on file.  No Known Allergies  No current facility-administered medications on file prior to encounter.     Current Outpatient Medications on File Prior to Encounter   Medication Sig Dispense Refill     "amLODIPine (Norvasc) 10 mg tablet Take 1 tablet (10 mg) by mouth once daily.      atorvastatin (Lipitor) 40 mg tablet Take 1 tablet (40 mg) by mouth once daily.      carvedilol (Coreg) 25 mg tablet Take 1 tablet (25 mg) by mouth 2 times a day with meals.      [] cefdinir (Omnicef) 300 mg capsule Take 1 capsule (300 mg) by mouth 2 times a day for 7 days. 14 capsule 0    icosapent ethyL (Vascepa) 1 gram capsule Take 1 capsule (1 g) by mouth 2 times a day with meals.      insulin aspart (NovoLOG U-100 Insulin aspart) 100 unit/mL injection Inject 100 Units under the skin 3 times a day before meals. Take as directed per insulin instructions.      insulin degludec (Tresiba FlexTouch U-200) 200 unit/mL (3 mL) injection Inject 40 Units under the skin once daily at bedtime. Take as directed per insulin instructions.      latanoprost (Xalatan) 0.005 % ophthalmic solution Administer 1 drop into both eyes once daily at bedtime.      losartan (Cozaar) 50 mg tablet Take 2 tablets (100 mg) by mouth once daily.      magnesium oxide (Mag-Ox) 400 mg tablet Take 1 tablet (400 mg) by mouth once daily.      omeprazole (PriLOSEC) 40 mg DR capsule Take 1 capsule (40 mg) by mouth once daily in the morning. Take before meals. Do not crush or chew.      pen needle, diabetic (BD Traci 2nd Gen Pen Needle) 32 gauge x 5/32\" needle       potassium chloride ER (Micro-K) 10 mEq ER capsule Take 2 capsules (20 mEq) by mouth once daily. Do not crush or chew.      venlafaxine XR (Effexor-XR) 150 mg 24 hr capsule Take 1 capsule (150 mg) by mouth once daily. Do not crush or chew.      [DISCONTINUED] ferrous sulfate 325 (65 Fe) MG EC tablet Take 65 mg by mouth 3 times a day with meals. Do not crush, chew, or split.      [DISCONTINUED] fluticasone propion-salmeteroL (Advair) 115-21 mcg/actuation inhaler Inhale 2 puffs 2 times a day. Rinse mouth with water after use to reduce aftertaste and incidence of candidiasis. Do not swallow.      [DISCONTINUED] " hydroCHLOROthiazide (HYDRODiuril) 50 mg tablet Take 1 tablet (50 mg) by mouth once daily.      [DISCONTINUED] vitamin E 450 mg (1000 unit) capsule Take 100 Units by mouth once daily.         There is no immunization history on file for this patient.  Patient's medical history was reviewed and updated either before or during this encounter.  ASSESSMENT / PLAN:  Active Hospital Problems    Hyperlipidemia      GERD (gastroesophageal reflux disease)      Type 2 diabetes mellitus without complication, with long-term current use of insulin (CMS/[facility])      Primary hypertension      *Mood disorder (CMS/[facility])    Patient's blood pressure is fairly controlled.  Blood sugars have been fairly controlled.  Denies any significant complaints.  Patient has elevated triglycerides.  Patient is on statins.  Patient is being seen by geropsych team.        Osiel Forman MD

## 2023-10-21 NOTE — GROUP NOTE
Group Topic: Other   Group Date: 10/21/2023  Start Time: 0945  End Time: 1015  Facilitators: JENNIFER Gramajo   Department: [facility] REHABTH VIRTUAL    Number of Participants: 6   Group Focus: other What's up?  Treatment Modality: Other: Recreation Therpay  Interventions utilized were exploration, mental fitness, orientation, and reminiscence  Purpose: feelings and insight or knowledge    Name: Madalyn Ricci YOB: 1940   MR: [id number]      Facilitator: Recreational Therapist  Level of Participation: moderate  Quality of Participation: appropriate/pleasant, attentive, cooperative, and engaged  Interactions with others: appropriate  Mood/Affect: appropriate  Triggers (if applicable): n/a  Cognition: coherent/clear  Progress: Moderate  Comments: pt problem is depressed mood.   Plan: continue with services

## 2023-10-21 NOTE — NURSING NOTE
WISAM NOTE     Problem:  depression     Behavior:  Pt pleasant and cooperative with staff and care. Pt quiet and isolates to her room at times. Pt able to make her needs known.    Group Participation: yes  Appetite/Meals: good       Interventions:  Administered scheduled medicatons    Response:  Pt continues to isolate to her room after dinner.     Plan:  Maintain pt safety

## 2023-10-21 NOTE — CARE PLAN
The patient's goals for the shift include to go home    The clinical goals for the shift include no falls    Over the shift, the patient did not make progress toward the following goals. Barriers to progression include placement. Recommendations to address these barriers include SW working on placement and pt is attending group and interacting with peers.

## 2023-10-21 NOTE — GROUP NOTE
Group Topic: Other   Group Date: 10/21/2023  Start Time: 1500  End Time: 1600  Facilitators: JENNIFER Gramajo   Department: [facility] REHABTH VIRTUAL    Number of Participants: 5   Group Focus: other Name that tune...Country style  Treatment Modality: Other: Recreation therapy  Interventions utilized were exploration, mental fitness, and reminiscence  Purpose: feelings and other: fun    Name: Madalyn Ricci YOB: 1940   MR: [id number]      Facilitator: Recreational Therapist  Level of Participation: active  Quality of Participation: appropriate/pleasant, attentive, and cooperative  Interactions with others: appropriate  Mood/Affect: appropriate  Triggers (if applicable): n/a  Cognition: coherent/clear  Progress: Moderate  Comments: pt problem is SI.  Plan: continue with services

## 2023-10-21 NOTE — NURSING NOTE
WISAM NOTE     Problem:  SI/Depression     Behavior:  Pt is observed sitting in the hallway, she says she is waiting for snack. She is pleasant, calm and cooperative. Denies SI at this time. States that she doesn't really feel depressed either. Pt is cooperative with care and compliant with medications. Interacts slightly with peers.   Group Participation: n/a  Appetite/Meals: HS snack provided       Interventions:  HS medications given per MAR  Assessment completed    Response:  After snack, pt took herself to her room. No s/s of distress noted     Plan:  Continue to monitor and assist. Maintain q15 minutes rounds for safety.

## 2023-10-21 NOTE — GROUP NOTE
Group Topic: Other   Group Date: 10/21/2023  Start Time: 1015  End Time: 1100  Facilitators: JENNIFER Gramajo   Department: [facility] REHABTH VIRTUAL    Number of Participants: 6   Group Focus: other Leisure awareness  Treatment Modality: Other: Recreation therapy  Interventions utilized were exploration, mental fitness, orientation, and problem solving  Purpose: feelings and insight or knowledge    Name: Madalyn Ricci YOB: 1940   MR: [id number]      Facilitator: Recreational Therapist  Level of Participation: moderate  Quality of Participation: appropriate/pleasant, attentive, and cooperative  Interactions with others: appropriate  Mood/Affect: appropriate  Triggers (if applicable): n/a  Cognition: coherent/clear  Progress: Moderate  Comments: pt problem is SI.    Plan: continue with services

## 2023-10-22 LAB
GLUCOSE BLD MANUAL STRIP-MCNC: 123 MG/DL (ref 74–99)
GLUCOSE BLD MANUAL STRIP-MCNC: 149 MG/DL (ref 74–99)
GLUCOSE BLD MANUAL STRIP-MCNC: 154 MG/DL (ref 74–99)
GLUCOSE BLD MANUAL STRIP-MCNC: 189 MG/DL (ref 74–99)

## 2023-10-22 PROCEDURE — 2500000001 HC RX 250 WO HCPCS SELF ADMINISTERED DRUGS (ALT 637 FOR MEDICARE OP): Performed by: PSYCHIATRY & NEUROLOGY

## 2023-10-22 PROCEDURE — 2500000001 HC RX 250 WO HCPCS SELF ADMINISTERED DRUGS (ALT 637 FOR MEDICARE OP): Performed by: INTERNAL MEDICINE

## 2023-10-22 PROCEDURE — 1140000001 HC PRIVATE PSYCH ROOM DAILY

## 2023-10-22 PROCEDURE — 2500000004 HC RX 250 GENERAL PHARMACY W/ HCPCS (ALT 636 FOR OP/ED): Performed by: INTERNAL MEDICINE

## 2023-10-22 PROCEDURE — 82947 ASSAY GLUCOSE BLOOD QUANT: CPT

## 2023-10-22 RX ADMIN — Medication 400 MG: at 08:34

## 2023-10-22 RX ADMIN — LOSARTAN POTASSIUM 100 MG: 50 TABLET, FILM COATED ORAL at 08:34

## 2023-10-22 RX ADMIN — ATORVASTATIN CALCIUM 40 MG: 40 TABLET, FILM COATED ORAL at 21:31

## 2023-10-22 RX ADMIN — VENLAFAXINE HYDROCHLORIDE 150 MG: 150 CAPSULE, EXTENDED RELEASE ORAL at 08:34

## 2023-10-22 RX ADMIN — AMLODIPINE BESYLATE 10 MG: 10 TABLET ORAL at 08:34

## 2023-10-22 RX ADMIN — CARVEDILOL 25 MG: 12.5 TABLET, FILM COATED ORAL at 18:03

## 2023-10-22 RX ADMIN — ICOSAPENT ETHYL 1 G: 1 CAPSULE ORAL at 18:03

## 2023-10-22 RX ADMIN — CARVEDILOL 25 MG: 12.5 TABLET, FILM COATED ORAL at 08:34

## 2023-10-22 RX ADMIN — ICOSAPENT ETHYL 1 G: 1 CAPSULE ORAL at 08:34

## 2023-10-22 RX ADMIN — PANTOPRAZOLE SODIUM 40 MG: 40 TABLET, DELAYED RELEASE ORAL at 08:34

## 2023-10-22 RX ADMIN — INSULIN GLARGINE 40 UNITS: 100 INJECTION, SOLUTION SUBCUTANEOUS at 21:29

## 2023-10-22 RX ADMIN — LATANOPROST 1 DROP: 50 SOLUTION OPHTHALMIC at 21:31

## 2023-10-22 ASSESSMENT — COLUMBIA-SUICIDE SEVERITY RATING SCALE - C-SSRS
1. SINCE LAST CONTACT, HAVE YOU WISHED YOU WERE DEAD OR WISHED YOU COULD GO TO SLEEP AND NOT WAKE UP?: NO
1. SINCE LAST CONTACT, HAVE YOU WISHED YOU WERE DEAD OR WISHED YOU COULD GO TO SLEEP AND NOT WAKE UP?: NO
6. HAVE YOU EVER DONE ANYTHING, STARTED TO DO ANYTHING, OR PREPARED TO DO ANYTHING TO END YOUR LIFE?: NO
2. HAVE YOU ACTUALLY HAD ANY THOUGHTS OF KILLING YOURSELF?: NO
6. HAVE YOU EVER DONE ANYTHING, STARTED TO DO ANYTHING, OR PREPARED TO DO ANYTHING TO END YOUR LIFE?: NO
6. HAVE YOU EVER DONE ANYTHING, STARTED TO DO ANYTHING, OR PREPARED TO DO ANYTHING TO END YOUR LIFE?: NO
2. HAVE YOU ACTUALLY HAD ANY THOUGHTS OF KILLING YOURSELF?: NO
2. HAVE YOU ACTUALLY HAD ANY THOUGHTS OF KILLING YOURSELF?: NO
1. SINCE LAST CONTACT, HAVE YOU WISHED YOU WERE DEAD OR WISHED YOU COULD GO TO SLEEP AND NOT WAKE UP?: NO

## 2023-10-22 ASSESSMENT — PAIN SCALES - GENERAL
PAINLEVEL_OUTOF10: 0 - NO PAIN

## 2023-10-22 ASSESSMENT — PAIN - FUNCTIONAL ASSESSMENT
PAIN_FUNCTIONAL_ASSESSMENT: 0-10

## 2023-10-22 NOTE — GROUP NOTE
Group Topic: Other   Group Date: 10/22/2023  Start Time: 1530  End Time: 1600  Facilitators: JENNIFER Gramajo   Department: [facility] REHABTH VIRTUAL    Number of Participants: 5   Group Focus: other [facility]  Treatment Modality: Other: Recreation therapy  Interventions utilized were mental fitness  Purpose: Mental exercise    Name: Madalyn Ricci YOB: 1940   MR: [id number]      Facilitator: Recreational Therapist  Level of Participation: moderate  Quality of Participation: appropriate/pleasant, attentive, and cooperative  Interactions with others: appropriate  Mood/Affect: appropriate  Triggers (if applicable): n/a  Cognition: coherent/clear  Progress: Moderate  Comments: pt problem is depressed mood.    Plan: continue with services

## 2023-10-22 NOTE — CARE PLAN
The patient's goals for the shift include go home    The clinical goals for the shift include no falls    Over the shift, the patient did not make progress toward the following goals. Barriers to progression include isolating to room except for meals and group therapy. Recommendations to address these barriers include encourage engagement with peers and staff.

## 2023-10-22 NOTE — GROUP NOTE
Group Topic: Self Esteem   Group Date: 10/22/2023  Start Time: 1030  End Time: 1100  Facilitators: JENNIFER Gramajo   Department: [facility] REHABTH VIRTUAL    Number of Participants: 6   Group Focus: self-esteem  Treatment Modality: Other: Recreation therapy  Interventions utilized were exploration, mental fitness, and problem solving  Purpose: feelings, self-worth, and self-care    Name: Madalyn Ricci YOB: 1940   MR: [id number]      Facilitator: Recreational Therapist  Level of Participation: active  Quality of Participation: appropriate/pleasant, attentive, and cooperative  Interactions with others: appropriate  Mood/Affect: appropriate  Triggers (if applicable): n/a  Cognition:  did not attend  Progress: None  Comments: pt problem is depressed mood.  Plan: continue with services

## 2023-10-22 NOTE — NURSING NOTE
WISAM NOTE     Problem:  depression     Behavior:  Pt pleasant and cooperative with staff and care. Pt out of room for groups and meals, otherwise isolates to her room.  Group Participation: yes  Appetite/Meals: good       Interventions:  Administered scheduled medications    Response:  Pt laying in bed after dinner with lights on and sitting quietly.     Plan:  Maintain pt safety

## 2023-10-22 NOTE — GROUP NOTE
Group Topic: Spiritual/Devotional/Thought of the Day   Group Date: 10/22/2023  Start Time: 0945  End Time: 1030  Facilitators: JENNIFER Gramajo   Department: [facility] REHABTH VIRTUAL    Number of Participants: 4   Group Focus: affirmation, daily focus, feeling awareness/expression, mindfulness, and self-awareness  Treatment Modality: Other: Recreation Therapy  Interventions utilized were exploration and mental fitness  Purpose: feelings, insight or knowledge, and self-worth    Name: Madalyn Ricci YOB: 1940   MR: [id number]      Facilitator: Recreational Therapist  Level of Participation: active  Quality of Participation: appropriate/pleasant, attentive, and cooperative  Interactions with others: appropriate  Mood/Affect: appropriate  Triggers (if applicable): n/a  Cognition: coherent/clear  Progress: Significant  Comments: pt problem is depressed mood.  Plan: continue with services

## 2023-10-23 LAB
GLUCOSE BLD MANUAL STRIP-MCNC: 136 MG/DL (ref 74–99)
GLUCOSE BLD MANUAL STRIP-MCNC: 191 MG/DL (ref 74–99)
GLUCOSE BLD MANUAL STRIP-MCNC: 197 MG/DL (ref 74–99)
GLUCOSE BLD MANUAL STRIP-MCNC: 85 MG/DL (ref 74–99)

## 2023-10-23 PROCEDURE — 99232 SBSQ HOSP IP/OBS MODERATE 35: CPT | Performed by: INTERNAL MEDICINE

## 2023-10-23 PROCEDURE — 2500000001 HC RX 250 WO HCPCS SELF ADMINISTERED DRUGS (ALT 637 FOR MEDICARE OP): Performed by: INTERNAL MEDICINE

## 2023-10-23 PROCEDURE — 97110 THERAPEUTIC EXERCISES: CPT | Mod: GP

## 2023-10-23 PROCEDURE — 2500000001 HC RX 250 WO HCPCS SELF ADMINISTERED DRUGS (ALT 637 FOR MEDICARE OP): Performed by: PSYCHIATRY & NEUROLOGY

## 2023-10-23 PROCEDURE — 97530 THERAPEUTIC ACTIVITIES: CPT | Mod: GP

## 2023-10-23 PROCEDURE — 2500000004 HC RX 250 GENERAL PHARMACY W/ HCPCS (ALT 636 FOR OP/ED): Performed by: INTERNAL MEDICINE

## 2023-10-23 PROCEDURE — 99232 SBSQ HOSP IP/OBS MODERATE 35: CPT | Performed by: REGISTERED NURSE

## 2023-10-23 PROCEDURE — 1140000001 HC PRIVATE PSYCH ROOM DAILY

## 2023-10-23 PROCEDURE — 82947 ASSAY GLUCOSE BLOOD QUANT: CPT

## 2023-10-23 RX ADMIN — LATANOPROST 1 DROP: 50 SOLUTION OPHTHALMIC at 20:34

## 2023-10-23 RX ADMIN — CARVEDILOL 25 MG: 12.5 TABLET, FILM COATED ORAL at 08:32

## 2023-10-23 RX ADMIN — ICOSAPENT ETHYL 1 G: 1 CAPSULE ORAL at 08:32

## 2023-10-23 RX ADMIN — INSULIN GLARGINE 40 UNITS: 100 INJECTION, SOLUTION SUBCUTANEOUS at 20:34

## 2023-10-23 RX ADMIN — CARVEDILOL 25 MG: 12.5 TABLET, FILM COATED ORAL at 17:07

## 2023-10-23 RX ADMIN — ICOSAPENT ETHYL 1 G: 1 CAPSULE ORAL at 17:07

## 2023-10-23 RX ADMIN — ATORVASTATIN CALCIUM 40 MG: 40 TABLET, FILM COATED ORAL at 20:34

## 2023-10-23 RX ADMIN — Medication 400 MG: at 08:33

## 2023-10-23 RX ADMIN — AMLODIPINE BESYLATE 10 MG: 10 TABLET ORAL at 08:33

## 2023-10-23 RX ADMIN — VENLAFAXINE HYDROCHLORIDE 150 MG: 150 CAPSULE, EXTENDED RELEASE ORAL at 08:32

## 2023-10-23 RX ADMIN — LOSARTAN POTASSIUM 100 MG: 50 TABLET, FILM COATED ORAL at 08:32

## 2023-10-23 RX ADMIN — PANTOPRAZOLE SODIUM 40 MG: 40 TABLET, DELAYED RELEASE ORAL at 08:08

## 2023-10-23 ASSESSMENT — COLUMBIA-SUICIDE SEVERITY RATING SCALE - C-SSRS
2. HAVE YOU ACTUALLY HAD ANY THOUGHTS OF KILLING YOURSELF?: NO
2. HAVE YOU ACTUALLY HAD ANY THOUGHTS OF KILLING YOURSELF?: NO
6. HAVE YOU EVER DONE ANYTHING, STARTED TO DO ANYTHING, OR PREPARED TO DO ANYTHING TO END YOUR LIFE?: NO
1. SINCE LAST CONTACT, HAVE YOU WISHED YOU WERE DEAD OR WISHED YOU COULD GO TO SLEEP AND NOT WAKE UP?: NO
1. SINCE LAST CONTACT, HAVE YOU WISHED YOU WERE DEAD OR WISHED YOU COULD GO TO SLEEP AND NOT WAKE UP?: NO
6. HAVE YOU EVER DONE ANYTHING, STARTED TO DO ANYTHING, OR PREPARED TO DO ANYTHING TO END YOUR LIFE?: NO

## 2023-10-23 ASSESSMENT — COGNITIVE AND FUNCTIONAL STATUS - GENERAL
MOBILITY SCORE: 17
WALKING IN HOSPITAL ROOM: A LITTLE
TURNING FROM BACK TO SIDE WHILE IN FLAT BAD: A LITTLE
CLIMB 3 TO 5 STEPS WITH RAILING: A LOT
STANDING UP FROM CHAIR USING ARMS: A LITTLE
MOVING TO AND FROM BED TO CHAIR: A LITTLE
MOVING FROM LYING ON BACK TO SITTING ON SIDE OF FLAT BED WITH BEDRAILS: A LITTLE

## 2023-10-23 ASSESSMENT — PAIN - FUNCTIONAL ASSESSMENT
PAIN_FUNCTIONAL_ASSESSMENT: 0-10
PAIN_FUNCTIONAL_ASSESSMENT: 0-10

## 2023-10-23 ASSESSMENT — PAIN SCALES - GENERAL
PAINLEVEL_OUTOF10: 0 - NO PAIN
PAINLEVEL_OUTOF10: 0 - NO PAIN
PAINLEVEL_OUTOF10: 8

## 2023-10-23 NOTE — NURSING NOTE
WISAM NOTE     Problem:  depression     Behavior:  Pt is pleasant this evening. She is cooperative with care, compliant with medications. Pt is interacting with staff appropriately. Denies depression and SI  Group Participation: N/a  Appetite/Meals: hs snack provided       Interventions:  HS medications given whole with water  Assessment completed  Snack provided.    Response:  Pt is restful throughout the night, no s/s of distress noted.      Plan:  Continue to monitor and assist. Maintain q15 minutes rounds for safety.

## 2023-10-23 NOTE — PROGRESS NOTES
Madalyn Ricci is a 83 y.o. female on day 6 of admission presenting with Mood disorder (CMS/HCC).      Patient is an 82yo female admitted for Mood Disorder.    Case discussed in morning team.    Vitals signs reviewed  Patient case was discussed in morning team.  She slept 8hs appetite is good, mood is good. Taking meds as ordered. No prns required. She is attending groups.  Social work is looking into discharge plan. [facility] was accepted and social work is attempting to find SNF placement    Past Medical History  She has a past medical history of Diabetes mellitus (CMS/[facility]) and Hypertension.    Past Psychiatric History:   Previous therapy: no  Previous psychiatric treatment and medication trials: no  Previous psychiatric hospitalizations: no  Previous diagnoses: no  Previous suicide attempts: no  History of violence: no  Currently in treatment with N/A.  Depression screening was performed with standardized tool: Yes, Depression    Surgical History  She has a past surgical history that includes Lung lobectomy (10/13/2015); Hysterectomy (10/13/2015); Cholecystectomy (10/13/2015); Appendectomy (10/13/2015); Hemicolectomy (10/13/2015); Cataract extraction (10/13/2015); and CT guided imaging for needle placement (2/4/2015).     Social History  She reports that she has never smoked. She has never used smokeless tobacco. No history on file for alcohol use and drug use.     Allergies  Patient has no known allergies.    Review of Systems    Psychiatric ROS - Adult  Anxiety: Mild  Depression: negative  Delirium: negative  Psychosis: negative  Margret: negative  Safety Issues: none  Psychiatric ROS Comment: The patient states that she is not suicidal, she did  and denies AVH. The patient states that she is not depressed and the only concern now is a caring and safe environment for her to live on.    Physical Exam      Mental Status Exam  General: NAD  Appearance: Hospital attire  Attitude: Accepting  Behavior: tearful  Motor  "Activity: in wheelchair  Speech: Verbose  Mood: Sad, Labile  Affect: more open  Thought Process: Loose associations   Thought Content: no delusions eleicted, denies SI/HI  Thought Perception: denies AH/VH/paranoia  Cognition: fair  Insight: fair  Judgement: fair    Psychiatric Risk Assessment  Violence Risk Assessment: none  Acute Risk of Harm to Others is Considered: low   Suicide Risk Assessment: age > 65 yrs old, , chronic medical illness, history of trauma or abuse, and living alone or lack of social support  Protective Factors against Suicide: fear of suicide and [employer] affiliation/spirituality  Acute Risk of Harm to Self is Considered: low    Last Recorded Vitals  Blood pressure (!) 128/93, pulse 84, temperature 36.1 °C (97 °F), temperature source Temporal, resp. rate 17, height 1.626 m (5' 4.02\"), weight 127 kg (278 lb 15.9 oz), SpO2 99 %.    Relevant Results      Current Facility-Administered Medications:     acetaminophen (Tylenol) tablet 650 mg, 650 mg, oral, q4h PRN, Navin Brooke DO, 650 mg at 10/21/23 0910    alum-mag hydroxide-simeth (Mylanta) 200-200-20 mg/5 mL oral suspension 30 mL, 30 mL, oral, q6h PRN, Navin Brooke DO    amLODIPine (Norvasc) tablet 10 mg, 10 mg, oral, Daily, Osiel Forman MD, 10 mg at 10/23/23 0833    atorvastatin (Lipitor) tablet 40 mg, 40 mg, oral, Nightly, Osiel Forman MD, 40 mg at 10/22/23 2131    carvedilol (Coreg) tablet 25 mg, 25 mg, oral, BID with meals, Osiel Forman MD, 25 mg at 10/23/23 0832    dextrose 10 % in water (D10W) infusion, 0.3 g/kg/hr, intravenous, Once PRN, Osiel Forman MD    dextrose 50 % injection 25 g, 25 g, intravenous, q15 min PRN, Osiel Forman MD    diphenhydrAMINE (BENADryl) capsule 50 mg, 50 mg, oral, q6h PRN **OR** diphenhydrAMINE (BENADryl) injection 50 mg, 50 mg, intramuscular, Once PRN, Navin Brooke DO    glucagon (Glucagen) injection 1 mg, 1 mg, intramuscular, q15 min PRN, Osiel LAGUNA" MD Dickson    icosapent ethyL (Vascepa) capsule 1 g, 1 g, oral, BID with meals, Osiel Forman MD, 1 g at 10/23/23 0832    insulin glargine (Lantus) injection 40 Units, 40 Units, subcutaneous, Nightly, Osiel Forman MD, 40 Units at 10/22/23 2129    latanoprost (Xalatan) 0.005 % ophthalmic solution 1 drop, 1 drop, Both Eyes, Nightly, Osiel Forman MD, 1 drop at 10/22/23 2131    losartan (Cozaar) tablet 100 mg, 100 mg, oral, Daily, Osiel Forman MD, 100 mg at 10/23/23 0832    magnesium hydroxide (Milk of Magnesia) 400 mg/5 mL suspension 30 mL, 30 mL, oral, Daily PRN, Navin Brooke DO    magnesium oxide (Mag-Ox) tablet 400 mg, 400 mg, oral, Daily, Osiel Forman MD, 400 mg at 10/23/23 0833    OLANZapine (ZyPREXA) tablet 5 mg, 5 mg, oral, q6h PRN **OR** OLANZapine (ZyPREXA) injection 5 mg, 5 mg, intramuscular, q6h PRN, Navin Brooke DO    pantoprazole (ProtoNix) EC tablet 40 mg, 40 mg, oral, Daily before breakfast, Osiel Forman MD, 40 mg at 10/23/23 0808    traZODone (Desyrel) tablet 25 mg, 25 mg, oral, Nightly PRN, Navin Brooke DO    venlafaxine XR (Effexor-XR) 24 hr capsule 150 mg, 150 mg, oral, Daily, Navin Brooke DO, 150 mg at 10/23/23 0832      Assessment/Plan   Principal Problem:    Mood disorder (CMS/HCC)  Active Problems:    Hyperlipidemia    GERD (gastroesophageal reflux disease)    Type 2 diabetes mellitus without complication, with long-term current use of insulin (CMS/HCC)    Primary hypertension    Continue meds as ordered.   Continue daily rounds.   Continue to encourage scheduled medication.  Encourage participation on group therapy.  Appreciate social work arranging placement in assisted living          Medication Consent  Medication Consent: risks, benefits, side effects reviewed for all ordered medications    I spent 40  minutes in the professional and overall care of this patient.      EMILIANA Granados-CNP  I reviewed this note and have seen  this patient.Madalyn Ricci is a 83 y.o. female on day 6 of admission presenting with Mood disorder (CMS/HCC).Madalyn Ricci is a 83 y.o. female on day 6 of admission presenting with Mood disorder (CMS/HCC).

## 2023-10-23 NOTE — GROUP NOTE
Group Topic: Other   Group Date: 10/23/2023  Start Time: 1100  End Time: 1140  Facilitators: JENNIFER Gramajo   Department: [facility] REHABTH VIRTUAL    Number of Participants: 5   Group Focus: reminiscence  Treatment Modality: Other: Recreation therapy  Interventions utilized were exploration, reality testing, and reminiscence  Purpose: feelings    Name: Madalyn Ricci YOB: 1940   MR: [id number]      Facilitator: Recreational Therapist  Level of Participation: active  Quality of Participation: appropriate/pleasant, attentive, and cooperative  Interactions with others: appropriate  Mood/Affect: appropriate  Triggers (if applicable): n/a  Cognition: coherent/clear  Progress: Moderate  Comments: pt problem is SI.  Plan: continue with services

## 2023-10-23 NOTE — GROUP NOTE
"Group Topic: Other   Group Date: 10/23/2023  Start Time: 1520  End Time: 1545  Facilitators: JAEL GramajoS   Department: [facility] REHABTH VIRTUAL    Number of Participants: 4   Group Focus: reminiscence  Treatment Modality: Other: Recreation therapy  Interventions utilized were exploration and reminiscence  Purpose: feelings and insight or knowledge    Name: Madalyn Ricci YOB: 1940   MR: [id number]      Facilitator: Recreational Therapist  Level of Participation: did not attend  Quality of Participation:  did not attend  Interactions with others:  did not attend  Mood/Affect:  did not attend  Triggers (if applicable): n/a  Cognition:  did not attend  Progress: None  Comments: pt problem is SI.  Pt is resting in her room.  Declines invitation to join group, reports she is \"elevating\" her legs.  Requests to rest in her room.    Plan: continue with services      "

## 2023-10-23 NOTE — GROUP NOTE
Group Topic: Music Therapy   Group Date: 10/23/2023  Start Time: 1000  End Time: 1100  Facilitators: ROOSEVELT Kelley   Department: [facility] EXPRESS[facility] THER VIRTUAL    Number of Participants: 8   Group Focus: music therapy  Treatment Modality: Music Therapy  Interventions Utilized were: active music engagement, passive music engagement, patient education, and sharing/discussion      Name: Madalyn Ricci YOB: 1940   MR: [id number]      Level of Participation: active  Quality of Participation: attentive, cooperative, and engaged  Interactions with others: appropriate and supportive  Mood/Affect: appropriate and positive  Cognition, Pre Treatment: attentive  Cognition, Post Treatment: attentive and goal directed  Progress: Moderate  Plan: continue with services

## 2023-10-23 NOTE — NURSING NOTE
BIRP NOTE     Problem:  SI, depression     Behavior:  Patient is pleasant, calm and cooperative for assessment and medication administration. Makes needs known. Patient interacts appropriately with staff. Patient denies SI and depression.   Group Participation: Patient attends am group only.   Appetite/Meals: 100-100-100       Interventions:  Medications administered per orders.     Response:  Medication compliant.      Plan:  Maintain q 15 minute rounds for patient safety. Will continue to monitor behaviors. Continue current plan of care.

## 2023-10-23 NOTE — PROGRESS NOTES
"Physical Therapy    Physical Therapy Treatment    Patient Name: Madalyn Ricci  MRN: [id number]  Today's Date: 10/23/2023  Time Calculation  Start Time: 0830  Stop Time: 0900  Time Calculation (min): 30 min       Assessment/Plan   PT Assessment  PT Assessment Results: Decreased strength, Decreased endurance, Impaired balance, Decreased mobility, Decreased safety awareness, Impaired sensation, Obesity  Rehab Prognosis: Good  Evaluation/Treatment Tolerance: Patient tolerated treatment well  Medical Staff Made Aware: Yes  Strengths: Ability to acquire knowledge, Attitude of self  End of Session Communication: Bedside nurse  Assessment Comment:  (pt demonstrated improved amb tolerance and ease however standing tolerance remains limited due to c/o fatigue and LBP; decreased safety insight during transfers)  End of Session Patient Position:  (up in w/c)  PT Plan  Inpatient/Swing Bed or Outpatient: Inpatient  PT Plan  Treatment/Interventions: Bed mobility, Transfer training, Gait training, Balance training, Neuromuscular re-education, Strengthening, Endurance training, Therapeutic exercise, Therapeutic activity, Postural re-education  PT Plan: Skilled PT  PT Frequency: 4 times per week  PT Discharge Recommendations: Moderate intensity level of continued care      General Visit Information:   PT  Visit  PT Received On: 10/23/23  General  General Comment:  (pt sitting  in armchair by nurse's station; pt agreeable to therapy)    Subjective   Precautions:  Precautions  Medical Precautions: Fall precautions  Vital Signs:  Vital Signs  SpO2: 96 %  Patient Position: Sitting    Objective   Pain:  Pain Assessment  Pain Score: 8  Pain Location: Back (radiating to right lateral hip)  Pain Orientation: Right  Response to Interventions:  (pt requesting PT \" not to tell nurse as I will tell her when i see her. I am ok.\")  Cognition:  Cognition  Overall Cognitive Status: Within Functional Limits  Safety/Judgement:  (decreased insight into " transfer safety---see below)  Postural Control:  Postural Control  Posture Comment:  (obese)  Extremity/Trunk Assessments:    Activity Tolerance:  Activity Tolerance  Endurance: Decreased tolerance for upright activites  Treatments:  Therapeutic Exercise  Therapeutic Exercise Performed: Yes (pt performed seated bilateral LE AP, heel raises, LAQ's, marching, hip abd/add x 20 reps each; standing bilateral UE elevation x 20 reps with FWW in front of her and close supervision of 1.)    Therapeutic Activity  Therapeutic Activity Performed: Yes (see transfers and amb with FWW)    Ambulation/Gait Training  Ambulation/Gait Training Performed: Yes  Ambulation/Gait Training 1  Surface 1: Level tile  Device 1: Rolling walker  Assistance 1: Close supervision  Quality of Gait 1:  (antalgic-like gait due to c/o right lateral hip/back pain)  Comments/Distance (ft) 1:  (pt amb 60 ft x 2, 34 ft x 1 with FWW, 1 seated rest break after 120 ft, + turns, close supervision of 1, verbal cues for erect posture, staying close to FWW at all times)  Transfers  Transfer: Yes  Transfer 1  Transfer From 1: Sit to  Transfer to 1: Stand  Technique 1: Sit to stand  Transfer Device 1: Walker  Transfer Level of Assistance 1: Close supervision  Trials/Comments 1:  (verbal cues for proper bilateral hand placement for ease and safety of transition, scooting hips to edge of w/c.)  Transfers 2  Transfer From 2: Stand to  Transfer to 2: Sit  Technique 2: Stand to sit  Transfer Device 2: Walker  Trials/Comments 2:  (verbal cues for backing fully into w/c and reaching back with both hand sprior to attempting to sit)    Outcome Measures:  [facility] Basic Mobility  Turning from your back to your side while in a flat bed without using bedrails: A little  Moving from lying on your back to sitting on the side of a flat bed without using bedrails: A little  Moving to and from bed to chair (including a wheelchair): A little  Standing up from a chair using your arms  (e.g. wheelchair or bedside chair): A little  To walk in hospital room: A little  Climbing 3-5 steps with railing: A lot  Basic Mobility - Total Score: 17    Education Documentation  No documentation found.  Education Comments  No comments found.        OP EDUCATION:       Encounter Problems       Encounter Problems (Active)       Balance       LTG - Patient will maintain good dynamic balance to allow for safe mobility with RW. (Progressing)       Start:  10/18/23    Expected End:  11/01/23               Mobility       LTG - Patient will [id number] ft or greater mod Independent.  (Progressing)       Start:  10/18/23    Expected End:  11/01/23               Transfers       LTG - Transfer from bed to chair mod Independent (Progressing)       Start:  10/18/23    Expected End:  11/01/23            LTG - Patient will perform bed mobility mod Independent. (Progressing)       Start:  10/18/23    Expected End:  11/01/23            LTG - Patient will transfer sit to and from stand mod Independent. (Progressing)       Start:  10/18/23    Expected End:  11/01/23                  Encounter Problems (Resolved)       Transfers       Pt will transfer in/out of shower with grab bar and modified independent assist.  (Adequate for Discharge)       Start:  10/18/23    Expected End:  11/08/23    Resolved:  10/21/23

## 2023-10-23 NOTE — PROGRESS NOTES
[facility]: [facility]  PROGRESS NOTE      Madalyn Ricci is a 83 y.o. female that is being seen  today for follow-up at [facility].  No chief complaint on file..  Subjective   Patient is being seen for follow-up of [facility].  Patient's blood pressure has been better controlled.  Blood sugars have been stable.  No recent falls or episodes of agitation.  Patient is usually in the wheelchair since she has difficulty with walking.  Denies any other symptoms.      ROS  Negative for fever or chills  Negative for sore throat, ear pain, nasal discharge  Negative for cough, shortness of breath or wheezing  Negative for chest pain, palpitations, swelling of legs  Negative for abdominal pain, constipation, diarrhea, blood in the stools  Negative for urinary complaints  Negative for headache, dizziness, weakness or numbness  Negative for joint pain.  Positive for difficulty in walking  Positive for anxiety  All other systems reviewed and were negative   Vitals:    10/23/23 0832   BP: 128/78   Pulse: 70   Resp:    Temp:    SpO2:       Physical Exam  Constitutional: Patient does not appear to be in any acute distress  Head and Face: NCAT  Eyes: Normal external exam, EOMI  ENT: Normal external inspection of ears and nose. Oropharynx normal.  Cardiovascular: RRR, S1/S2, no murmurs, rubs, or gallops, radial pulses +2, no edema of extremities  Pulmonary: CTAB, no respiratory distress.  Abdomen: +BS, soft, non-tender, nondistended, no guarding or rebound, no masses noted  MSK: No joint swelling, normal movements of all extremities. Range of motion- normal.  Skin- No lesions, contusions, or erythema.  Peripheral puslses palpable bilaterally 2+  Neuro: AAO X3, Cranial nerves 2-12 grossly intact,DTR 2+ in all 4 limbs   Psychiatric: Judgment intact. Appropriate mood and behavior    Diagnostic Results   Lab Results   Component Value Date    GLUCOSE 199 (H) 10/16/2023    CALCIUM 9.3 10/16/2023     10/16/2023     K 3.7 10/16/2023    CO2 26 10/16/2023     (H) 10/16/2023    BUN 31 (H) 10/16/2023    CREATININE 1.20 10/16/2023     Lab Results   Component Value Date    ALT 11 10/16/2023    AST 16 10/16/2023    ALKPHOS 74 10/16/2023    BILITOT 0.3 10/16/2023     Lab Results   Component Value Date    WBC 6.4 10/16/2023    HGB 14.1 10/16/2023    HCT 44.1 10/16/2023    MCV 90 10/16/2023     10/16/2023     Lab Results   Component Value Date    CHOL 138 06/26/2020    CHOL 142 09/20/2019    CHOL 131 (L) 03/05/2019     Lab Results   Component Value Date    HDL 36 (L) 06/26/2020    HDL 36 (L) 09/20/2019    HDL 37 (L) 03/05/2019     Lab Results   Component Value Date    LDLCALC 44 (L) 06/26/2020    LDLCALC 36 (L) 09/20/2019    LDLCALC 49 (L) 03/05/2019     Lab Results   Component Value Date    TRIG 291 (H) 06/26/2020    TRIG 350 (H) 09/20/2019    TRIG 224 (H) 03/05/2019     Lab Results   Component Value Date    HGBA1C 6.4 (H) 12/12/2020     Other labs not included in the list above were reviewed either before or during this encounter.    History    Past Medical History:   Diagnosis Date    Diabetes mellitus (CMS/HCC)     Hypertension      Past Surgical History:   Procedure Laterality Date    APPENDECTOMY  10/13/2015    Appendectomy    CATARACT EXTRACTION  10/13/2015    Cataract Surgery    CHOLECYSTECTOMY  10/13/2015    Cholecystectomy    CT GUIDED IMAGING FOR NEEDLE PLACEMENT  2/4/2015    CT GUIDED IMAGING FOR NEEDLE PLACEMENT [facility]    HEMICOLECTOMY  10/13/2015    Hemicolectomy    HYSTERECTOMY  10/13/2015    Hysterectomy    LUNG LOBECTOMY  10/13/2015    Lung Lobectomy     No family history on file.  No Known Allergies  No current facility-administered medications on file prior to encounter.     Current Outpatient Medications on File Prior to Encounter   Medication Sig Dispense Refill    amLODIPine (Norvasc) 10 mg tablet Take 1 tablet (10 mg) by mouth once daily.      atorvastatin (Lipitor) 40 mg tablet Take 1  "tablet (40 mg) by mouth once daily.      carvedilol (Coreg) 25 mg tablet Take 1 tablet (25 mg) by mouth 2 times a day with meals.      [] cefdinir (Omnicef) 300 mg capsule Take 1 capsule (300 mg) by mouth 2 times a day for 7 days. 14 capsule 0    icosapent ethyL (Vascepa) 1 gram capsule Take 1 capsule (1 g) by mouth 2 times a day with meals.      insulin aspart (NovoLOG U-100 Insulin aspart) 100 unit/mL injection Inject 100 Units under the skin 3 times a day before meals. Take as directed per insulin instructions.      insulin degludec (Tresiba FlexTouch U-200) 200 unit/mL (3 mL) injection Inject 40 Units under the skin once daily at bedtime. Take as directed per insulin instructions.      latanoprost (Xalatan) 0.005 % ophthalmic solution Administer 1 drop into both eyes once daily at bedtime.      losartan (Cozaar) 50 mg tablet Take 2 tablets (100 mg) by mouth once daily.      magnesium oxide (Mag-Ox) 400 mg tablet Take 1 tablet (400 mg) by mouth once daily.      omeprazole (PriLOSEC) 40 mg DR capsule Take 1 capsule (40 mg) by mouth once daily in the morning. Take before meals. Do not crush or chew.      pen needle, diabetic (BD Traci 2nd Gen Pen Needle) 32 gauge x 5/32\" needle       potassium chloride ER (Micro-K) 10 mEq ER capsule Take 2 capsules (20 mEq) by mouth once daily. Do not crush or chew.      venlafaxine XR (Effexor-XR) 150 mg 24 hr capsule Take 1 capsule (150 mg) by mouth once daily. Do not crush or chew.      [DISCONTINUED] ferrous sulfate 325 (65 Fe) MG EC tablet Take 65 mg by mouth 3 times a day with meals. Do not crush, chew, or split.      [DISCONTINUED] fluticasone propion-salmeteroL (Advair) 115-21 mcg/actuation inhaler Inhale 2 puffs 2 times a day. Rinse mouth with water after use to reduce aftertaste and incidence of candidiasis. Do not swallow.      [DISCONTINUED] hydroCHLOROthiazide (HYDRODiuril) 50 mg tablet Take 1 tablet (50 mg) by mouth once daily.      [DISCONTINUED] vitamin E 450 " mg (1000 unit) capsule Take 100 Units by mouth once daily.         There is no immunization history on file for this patient.  Patient's medical history was reviewed and updated either before or during this encounter.  ASSESSMENT / PLAN:  Active Hospital Problems    Hyperlipidemia      GERD (gastroesophageal reflux disease)      Type 2 diabetes mellitus without complication, with long-term current use of insulin (CMS/[facility])      Primary hypertension      *Mood disorder (CMS/[facility])    Patient's blood pressure is fairly controlled.  Blood sugars have been fairly controlled.  Denies any significant complaints.  Patient has elevated triglycerides.  Patient is on statins.  Patient is being seen by geropsych team.        Osiel Forman MD

## 2023-10-23 NOTE — PROGRESS NOTES
Social Work Note    [facility] sent referrals out to [facility] for possible SNF placements. Will update treatment on results.

## 2023-10-24 LAB
GLUCOSE BLD MANUAL STRIP-MCNC: 160 MG/DL (ref 74–99)
GLUCOSE BLD MANUAL STRIP-MCNC: 192 MG/DL (ref 74–99)
GLUCOSE BLD MANUAL STRIP-MCNC: 195 MG/DL (ref 74–99)
GLUCOSE BLD MANUAL STRIP-MCNC: 73 MG/DL (ref 74–99)

## 2023-10-24 PROCEDURE — 97110 THERAPEUTIC EXERCISES: CPT | Mod: GP

## 2023-10-24 PROCEDURE — 1140000001 HC PRIVATE PSYCH ROOM DAILY

## 2023-10-24 PROCEDURE — 2500000001 HC RX 250 WO HCPCS SELF ADMINISTERED DRUGS (ALT 637 FOR MEDICARE OP): Performed by: INTERNAL MEDICINE

## 2023-10-24 PROCEDURE — 99232 SBSQ HOSP IP/OBS MODERATE 35: CPT | Performed by: REGISTERED NURSE

## 2023-10-24 PROCEDURE — 2500000001 HC RX 250 WO HCPCS SELF ADMINISTERED DRUGS (ALT 637 FOR MEDICARE OP): Performed by: PSYCHIATRY & NEUROLOGY

## 2023-10-24 PROCEDURE — 82947 ASSAY GLUCOSE BLOOD QUANT: CPT

## 2023-10-24 PROCEDURE — 2500000004 HC RX 250 GENERAL PHARMACY W/ HCPCS (ALT 636 FOR OP/ED): Performed by: INTERNAL MEDICINE

## 2023-10-24 PROCEDURE — 97530 THERAPEUTIC ACTIVITIES: CPT | Mod: GP

## 2023-10-24 RX ADMIN — ICOSAPENT ETHYL 1 G: 1 CAPSULE ORAL at 08:20

## 2023-10-24 RX ADMIN — INSULIN GLARGINE 40 UNITS: 100 INJECTION, SOLUTION SUBCUTANEOUS at 21:21

## 2023-10-24 RX ADMIN — CARVEDILOL 25 MG: 12.5 TABLET, FILM COATED ORAL at 08:19

## 2023-10-24 RX ADMIN — ATORVASTATIN CALCIUM 40 MG: 40 TABLET, FILM COATED ORAL at 21:19

## 2023-10-24 RX ADMIN — CARVEDILOL 25 MG: 12.5 TABLET, FILM COATED ORAL at 17:01

## 2023-10-24 RX ADMIN — VENLAFAXINE HYDROCHLORIDE 150 MG: 150 CAPSULE, EXTENDED RELEASE ORAL at 08:20

## 2023-10-24 RX ADMIN — AMLODIPINE BESYLATE 10 MG: 10 TABLET ORAL at 08:20

## 2023-10-24 RX ADMIN — LATANOPROST 1 DROP: 50 SOLUTION OPHTHALMIC at 21:21

## 2023-10-24 RX ADMIN — Medication 400 MG: at 08:20

## 2023-10-24 RX ADMIN — ICOSAPENT ETHYL 1 G: 1 CAPSULE ORAL at 17:02

## 2023-10-24 RX ADMIN — LOSARTAN POTASSIUM 100 MG: 50 TABLET, FILM COATED ORAL at 08:20

## 2023-10-24 RX ADMIN — PANTOPRAZOLE SODIUM 40 MG: 40 TABLET, DELAYED RELEASE ORAL at 06:44

## 2023-10-24 ASSESSMENT — PAIN - FUNCTIONAL ASSESSMENT
PAIN_FUNCTIONAL_ASSESSMENT: 0-10

## 2023-10-24 ASSESSMENT — PAIN SCALES - GENERAL
PAINLEVEL_OUTOF10: 0 - NO PAIN

## 2023-10-24 ASSESSMENT — COGNITIVE AND FUNCTIONAL STATUS - GENERAL
MOBILITY SCORE: 18
TURNING FROM BACK TO SIDE WHILE IN FLAT BAD: A LITTLE
WALKING IN HOSPITAL ROOM: A LITTLE
MOVING FROM LYING ON BACK TO SITTING ON SIDE OF FLAT BED WITH BEDRAILS: A LITTLE
MOVING TO AND FROM BED TO CHAIR: A LITTLE
STANDING UP FROM CHAIR USING ARMS: A LITTLE
CLIMB 3 TO 5 STEPS WITH RAILING: A LITTLE

## 2023-10-24 NOTE — GROUP NOTE
Group Topic: Other   Group Date: 10/24/2023  Start Time: 1515  End Time: 1555  Facilitators: JENNIFER Martin   Department: [facility] REH[facility]TH VIRTUAL    Number of Participants: 9   Group Focus: relaxation  Treatment Modality: Other: Recreation Therapy  Interventions utilized were mental fitness and other Relaxation   Purpose: self-care and other: Wellness, relaxation     Name: Madalyn Ricci YOB: 1940   MR: [id number]      Facilitator: Recreational Therapist  Level of Participation: moderate  Quality of Participation: appropriate/pleasant and quiet  Interactions with others:  quiet and appropriate  Mood/Affect: appropriate and brightens with interaction  Triggers (if applicable): n/a  Cognition: coherent/clear  Progress: Moderate  Comments: pt problem is SI.   Plan: continue with services

## 2023-10-24 NOTE — PROGRESS NOTES
"Physical Therapy    Physical Therapy Treatment    Patient Name: Madalyn Ricci  MRN: [id number]  Today's Date: 10/24/2023  Time Calculation  Start Time: 0830  Stop Time: 0900  Time Calculation (min): 30 min       Assessment/Plan   PT Assessment  PT Assessment Results: Decreased strength, Decreased endurance, Impaired balance, Decreased mobility, Decreased safety awareness, Impaired sensation, Obesity  Rehab Prognosis: Good  Evaluation/Treatment Tolerance: Patient tolerated treatment well  Medical Staff Made Aware: Yes  Strengths: Ability to acquire knowledge, Attitude of self  End of Session Communication: Bedside nurse  Assessment Comment:  (pt demonstrated improved amb tolerance and ease however standing tolerance remains limited due to c/o fatigue and LBP; decreased safety insight during transfers)  End of Session Patient Position: Bed, 1 rail up  PT Plan  Inpatient/Swing Bed or Outpatient: Inpatient  PT Plan  Treatment/Interventions: Bed mobility, Transfer training, Gait training, Balance training, Neuromuscular re-education, Strengthening, Endurance training, Therapeutic exercise, Therapeutic activity, Postural re-education  PT Plan: Skilled PT  PT Frequency: 4 times per week  PT Discharge Recommendations: Moderate intensity level of continued care      General Visit Information:   PT  Visit  PT Received On: 10/24/23  General  General Comment: pt semi-long sitting in bed, reading; pt agreeable to therapy    Subjective   Precautions:  Precautions  Medical Precautions: Fall precautions  Vital Signs:  Vital Signs  SpO2: 96 %  Patient Position: Sitting    Objective   Pain:  Pain Assessment  Pain Assessment: 0-10  Pain Score: 0 - No pain  Pain Location: Back (radiating to right lateral hip)  Pain Orientation: Right  Response to Interventions:  (pt requesting PT \" not to tell nurse as I will tell her when i see her. I am ok.\")  Cognition:  Cognition  Overall Cognitive Status: Within Functional " Limits  Safety/Judgement:  (decreased insight into transfer safety---see below)  Postural Control:  Postural Control  Posture Comment:  (obese)  Extremity/Trunk Assessments:    Activity Tolerance:  Activity Tolerance  Endurance: Decreased tolerance for upright activites  Activity Tolerance Comments:  (improved ambulation tolerance today as above)  Treatments:  Therapeutic Exercise  Therapeutic Exercise Performed: Yes ([facility] close supervision of 1, including bilateral LE heel raises, hip abd/ext, marching, shallow squats, ham curls, 1 set of 15 reps each; standing bilateral UE elevation 1 set of 15 reps)    Therapeutic Activity  Therapeutic Activity Performed: Yes (see bed mobility, transfers, amb with FWW)         Bed Mobility  Bed Mobility: Yes  Bed Mobility 1  Bed Mobility 1: Supine to sitting  Level of Assistance 1: Distant supervision  Bed Mobility Comments 1:  (head of bed flat)  Bed Mobility 2  Bed Mobility  2: Sitting to supine  Level of Assistance 2: Distant supervision  Bed Mobility Comments 2:  (head of bed flat)    Ambulation/Gait Training  Ambulation/Gait Training Performed: Yes  Ambulation/Gait Training 1  Surface 1: Level tile  Device 1: Rolling walker  Assistance 1: Close supervision  Quality of Gait 1:  (antalgic-like gait due to c/o right lateral hip/back pain)  Comments/Distance (ft) 1:  (pt amb 75 ft x 2, 40 ft x 2 with FWW, close supervision of 1, ( 1 seated rest break after 150 ft) , + turns, mildly antalgic-like gait with good balance and sequencing.)  Transfers  Transfer: Yes  Transfer 1  Transfer From 1: Sit to  Transfer to 1: Stand  Technique 1: Sit to stand  Transfer Device 1: Walker  Transfer Level of Assistance 1: Close supervision  Trials/Comments 1:  (verbal cues for proper bilateral hand placement)  Transfers 2  Transfer From 2: Stand to  Transfer to 2: Sit  Technique 2: Stand to sit  Transfer Device 2: Walker  Transfer Level of Assistance  2: Close supervision  Trials/Comments 2:  (verbal cues for proper bilateral hand placement)    Outcome Measures:  [facility] Basic Mobility  Turning from your back to your side while in a flat bed without using bedrails: A little  Moving from lying on your back to sitting on the side of a flat bed without using bedrails: A little  Moving to and from bed to chair (including a wheelchair): A little  Standing up from a chair using your arms (e.g. wheelchair or bedside chair): A little  To walk in hospital room: A little  Climbing 3-5 steps with railing: A little  Basic Mobility - Total Score: 18    Education Documentation  No documentation found.  Education Comments  No comments found.        OP EDUCATION:       Encounter Problems       Encounter Problems (Active)       Balance       LTG - Patient will maintain good dynamic balance to allow for safe mobility with RW. (Progressing)       Start:  10/18/23    Expected End:  11/01/23               Mobility       LTG - Patient will [id number] ft or greater mod Independent.  (Progressing)       Start:  10/18/23    Expected End:  11/01/23               Transfers       LTG - Transfer from bed to chair mod Independent (Progressing)       Start:  10/18/23    Expected End:  11/01/23            LTG - Patient will perform bed mobility mod Independent. (Progressing)       Start:  10/18/23    Expected End:  11/01/23            LTG - Patient will transfer sit to and from stand mod Independent. (Progressing)       Start:  10/18/23    Expected End:  11/01/23                  Encounter Problems (Resolved)       Transfers       Pt will transfer in/out of shower with grab bar and modified independent assist.  (Adequate for Discharge)       Start:  10/18/23    Expected End:  11/08/23    Resolved:  10/21/23

## 2023-10-24 NOTE — PROGRESS NOTES
Madalyn Ricci is a 83 y.o. female on day 7 of admission presenting with Mood disorder (CMS/HCC).      Patient is an 82yo female admitted for Mood Disorder.    Case discussed in morning team.    Vitals signs reviewed  Patient case was discussed in morning team.  She slept 8hs appetite is good, mood is good. Taking meds as ordered. No prns required. She is attending groups.  Social work is looking into discharge plan. [facility] was accepted and social work is attempting to find SNF placement. In November she will get her social security check and will hopefully be able to pay for assisted living if accomodations are not found before then.    Past Medical History  She has a past medical history of Diabetes mellitus (CMS/HCC) and Hypertension.    Past Psychiatric History:   Previous therapy: no  Previous psychiatric treatment and medication trials: no  Previous psychiatric hospitalizations: no  Previous diagnoses: no  Previous suicide attempts: no  History of violence: no  Currently in treatment with N/A.  Depression screening was performed with standardized tool: Yes, Depression    Surgical History  She has a past surgical history that includes Lung lobectomy (10/13/2015); Hysterectomy (10/13/2015); Cholecystectomy (10/13/2015); Appendectomy (10/13/2015); Hemicolectomy (10/13/2015); Cataract extraction (10/13/2015); and CT guided imaging for needle placement (2/4/2015).     Social History  She reports that she has never smoked. She has never used smokeless tobacco. No history on file for alcohol use and drug use.     Allergies  Patient has no known allergies.    Review of Systems    Psychiatric ROS - Adult  Anxiety: Mild  Depression: negative  Delirium: negative  Psychosis: negative  Margret: negative  Safety Issues: none  Psychiatric ROS Comment: The patient states that she is not suicidal, she did  and denies AVH. The patient states that she is not depressed and the only concern now is a caring and safe environment for  "her to live on.    Physical Exam      Mental Status Exam  General: NAD  Appearance: Hospital attire  Attitude: Accepting  Behavior: tearful  Motor Activity: in wheelchair  Speech: Verbose  Mood: Sad, Labile  Affect: more open  Thought Process: Loose associations   Thought Content: no delusions eleicted, denies SI/HI  Thought Perception: denies AH/VH/paranoia  Cognition: fair  Insight: fair  Judgement: fair    Psychiatric Risk Assessment  Violence Risk Assessment: none  Acute Risk of Harm to Others is Considered: low   Suicide Risk Assessment: age > 65 yrs old, , chronic medical illness, history of trauma or abuse, and living alone or lack of social support  Protective Factors against Suicide: fear of suicide and Hinduism affiliation/spirituality  Acute Risk of Harm to Self is Considered: low    Last Recorded Vitals  Blood pressure (!) 128/93, pulse 84, temperature 36.1 °C (97 °F), temperature source Temporal, resp. rate 17, height 1.626 m (5' 4.02\"), weight 127 kg (278 lb 15.9 oz), SpO2 99 %.    Relevant Results      Current Facility-Administered Medications:     acetaminophen (Tylenol) tablet 650 mg, 650 mg, oral, q4h PRN, Navin Brooke DO, 650 mg at 10/21/23 0910    alum-mag hydroxide-simeth (Mylanta) 200-200-20 mg/5 mL oral suspension 30 mL, 30 mL, oral, q6h PRN, Navin Brooke DO    amLODIPine (Norvasc) tablet 10 mg, 10 mg, oral, Daily, Osiel Forman MD, 10 mg at 10/24/23 0820    atorvastatin (Lipitor) tablet 40 mg, 40 mg, oral, Nightly, Osiel Forman MD, 40 mg at 10/23/23 2034    carvedilol (Coreg) tablet 25 mg, 25 mg, oral, BID with meals, Osiel Forman MD, 25 mg at 10/24/23 0819    dextrose 10 % in water (D10W) infusion, 0.3 g/kg/hr, intravenous, Once PRN, Osiel Forman MD    dextrose 50 % injection 25 g, 25 g, intravenous, q15 min PRN, Osiel Forman MD    diphenhydrAMINE (BENADryl) capsule 50 mg, 50 mg, oral, q6h PRN **OR** diphenhydrAMINE (BENADryl) injection " 50 mg, 50 mg, intramuscular, Once PRN, Navin Brooke DO    glucagon (Glucagen) injection 1 mg, 1 mg, intramuscular, q15 min PRN, Osiel Forman MD    icosapent ethyL (Vascepa) capsule 1 g, 1 g, oral, BID with meals, Osiel Froman MD, 1 g at 10/24/23 0820    insulin glargine (Lantus) injection 40 Units, 40 Units, subcutaneous, Nightly, Osiel Forman MD, 40 Units at 10/23/23 2034    latanoprost (Xalatan) 0.005 % ophthalmic solution 1 drop, 1 drop, Both Eyes, Nightly, Osiel Forman MD, 1 drop at 10/23/23 2034    losartan (Cozaar) tablet 100 mg, 100 mg, oral, Daily, Osiel Forman MD, 100 mg at 10/24/23 0820    magnesium hydroxide (Milk of Magnesia) 400 mg/5 mL suspension 30 mL, 30 mL, oral, Daily PRN, Navin Brooke DO    magnesium oxide (Mag-Ox) tablet 400 mg, 400 mg, oral, Daily, Osiel Forman MD, 400 mg at 10/24/23 0820    OLANZapine (ZyPREXA) tablet 5 mg, 5 mg, oral, q6h PRN **OR** OLANZapine (ZyPREXA) injection 5 mg, 5 mg, intramuscular, q6h PRN, Navin Brooke DO    pantoprazole (ProtoNix) EC tablet 40 mg, 40 mg, oral, Daily before breakfast, Osiel Forman MD, 40 mg at 10/24/23 0644    traZODone (Desyrel) tablet 25 mg, 25 mg, oral, Nightly PRN, Navin Brooke DO    venlafaxine XR (Effexor-XR) 24 hr capsule 150 mg, 150 mg, oral, Daily, Navin Brooke DO, 150 mg at 10/24/23 0820      Assessment/Plan   Principal Problem:    Mood disorder (CMS/HCC)  Active Problems:    Hyperlipidemia    GERD (gastroesophageal reflux disease)    Type 2 diabetes mellitus without complication, with long-term current use of insulin (CMS/HCC)    Primary hypertension    Continue meds as ordered.   Continue daily rounds.   Continue to encourage scheduled medication.  Encourage participation in group therapy.  Appreciate social work arranging placement in assisted living          Medication Consent  Medication Consent: risks, benefits, side effects reviewed for all ordered  medications    I spent 30  minutes in the professional and overall care of this patient.

## 2023-10-24 NOTE — CARE PLAN
Problem: Fall/Injury  Goal: Not fall by end of shift  Outcome: Met  Goal: Be free from injury by end of the shift  Outcome: Met   The patient's goals for the shift include No falls    The clinical goals for the shift include No falls/ medication compliance/ participate in mileu activities    Over the shift, the patient did not make progress toward the following goals.

## 2023-10-24 NOTE — NURSING NOTE
WISAM NOTE    Problem:  depression     Behavior:  Pt is pleasant this evening. She is cooperative with care, compliant with medications. Pt is interacting with staff appropriately. Denies depression and SI    Group Participation: No HS group  Appetite/Meals: 100-100-100        Interventions:  HS medications given whole with water. Snack provided.     Response:  Med compliant. No s/s of distress noted.      Plan:  Continue to monitor and assist. Maintain q15 minutes checks for safety.   Prescriptions electronically submitted to pharmacy from [facility]

## 2023-10-24 NOTE — GROUP NOTE
Group Topic: Self Esteem   Group Date: 10/24/2023  Start Time: 1000  End Time: 1045  Facilitators: JENNIFER Martin   Department: [facility]    Number of Participants: 5   Group Focus: affirmation and self-esteem  Treatment Modality: Other: Recreational Therapy  Interventions utilized were mental fitness  Purpose: self-worth and self-care    Name: Madalyn Ricci YOB: 1940   MR: [id number]      Facilitator: Recreational Therapist  Level of Participation: moderate  Quality of Participation: appropriate/pleasant and cooperative  Interactions with others: appropriate  Mood/Affect: appropriate  Triggers (if applicable): n/a  Cognition: coherent/clear  Progress: Moderate  Comments: pt problem is SI  Plan: continue with services       Cheek-To-Nose Interpolation Flap Text: A decision was made to reconstruct the defect utilizing an interpolation axial flap and a staged reconstruction.  A telfa template was made of the defect.  This telfa template was then used to outline the Cheek-To-Nose Interpolation flap.  The donor area for the pedicle flap was then injected with anesthesia.  The flap was excised through the skin and subcutaneous tissue down to the layer of the underlying musculature.  The interpolation flap was carefully excised within this deep plane to maintain its blood supply.  The edges of the donor site were undermined.   The donor site was closed in a primary fashion.  The pedicle was then rotated into position and sutured.  Once the tube was sutured into place, adequate blood supply was confirmed with blanching and refill.  The pedicle was then wrapped with xeroform gauze and dressed appropriately with a telfa and gauze bandage to ensure continued blood supply and protect the attached pedicle.

## 2023-10-24 NOTE — NURSING NOTE
BIRP NOTE     Problem:  SI, depression      Behavior:  Patient is pleasant and cooperative for assessment and medication administration. Makes needs known. Patient interacts appropriately with staff and peers. Patient denies SI.   Group Participation: Patient attends both am and pm groups.   Appetite/Meals: 100-100-100       Interventions:  Medications administered per orders.     Response:  Medication compliant.      Plan:  Maintain q 15 minute rounds for patient safety. Continue current plan of care.

## 2023-10-24 NOTE — GROUP NOTE
Group Topic: Other   Group Date: 10/24/2023  Start Time: 1045  End Time: 1115  Facilitators: JENNIFER Martin   Department: [facility]    Number of Participants: 5   Group Focus: other   Social skills, Trivi  Treatment Modality: Other: Recreation Therapy  Interventions utilized were mental fitness  Purpose: other: social skills, mental fitness    Name: Madalyn Ricci YOB: 1940   MR: [id number]      Facilitator: Recreational Therapist  Level of Participation: moderate  Quality of Participation: appropriate/pleasant  Interactions with others: appropriate  Mood/Affect: flat  Triggers (if applicable): n/a  Cognition: coherent/clear  Progress: Moderate  Comments: pt problem is SI  Plan: continue with services

## 2023-10-25 LAB
GLUCOSE BLD MANUAL STRIP-MCNC: 122 MG/DL (ref 74–99)
GLUCOSE BLD MANUAL STRIP-MCNC: 162 MG/DL (ref 74–99)
GLUCOSE BLD MANUAL STRIP-MCNC: 162 MG/DL (ref 74–99)
GLUCOSE BLD MANUAL STRIP-MCNC: 97 MG/DL (ref 74–99)

## 2023-10-25 PROCEDURE — 2500000001 HC RX 250 WO HCPCS SELF ADMINISTERED DRUGS (ALT 637 FOR MEDICARE OP): Performed by: INTERNAL MEDICINE

## 2023-10-25 PROCEDURE — 99232 SBSQ HOSP IP/OBS MODERATE 35: CPT | Performed by: INTERNAL MEDICINE

## 2023-10-25 PROCEDURE — 97116 GAIT TRAINING THERAPY: CPT | Mod: GP

## 2023-10-25 PROCEDURE — 99232 SBSQ HOSP IP/OBS MODERATE 35: CPT | Performed by: REGISTERED NURSE

## 2023-10-25 PROCEDURE — 1140000001 HC PRIVATE PSYCH ROOM DAILY

## 2023-10-25 PROCEDURE — 97110 THERAPEUTIC EXERCISES: CPT | Mod: GP

## 2023-10-25 PROCEDURE — 82947 ASSAY GLUCOSE BLOOD QUANT: CPT

## 2023-10-25 PROCEDURE — 2500000001 HC RX 250 WO HCPCS SELF ADMINISTERED DRUGS (ALT 637 FOR MEDICARE OP): Performed by: PSYCHIATRY & NEUROLOGY

## 2023-10-25 PROCEDURE — 2500000004 HC RX 250 GENERAL PHARMACY W/ HCPCS (ALT 636 FOR OP/ED): Performed by: INTERNAL MEDICINE

## 2023-10-25 RX ADMIN — Medication 400 MG: at 08:43

## 2023-10-25 RX ADMIN — ICOSAPENT ETHYL 1 G: 1 CAPSULE ORAL at 17:15

## 2023-10-25 RX ADMIN — LOSARTAN POTASSIUM 100 MG: 50 TABLET, FILM COATED ORAL at 08:43

## 2023-10-25 RX ADMIN — ATORVASTATIN CALCIUM 40 MG: 40 TABLET, FILM COATED ORAL at 20:32

## 2023-10-25 RX ADMIN — INSULIN GLARGINE 40 UNITS: 100 INJECTION, SOLUTION SUBCUTANEOUS at 20:33

## 2023-10-25 RX ADMIN — LATANOPROST 1 DROP: 50 SOLUTION OPHTHALMIC at 20:32

## 2023-10-25 RX ADMIN — CARVEDILOL 25 MG: 12.5 TABLET, FILM COATED ORAL at 08:43

## 2023-10-25 RX ADMIN — PANTOPRAZOLE SODIUM 40 MG: 40 TABLET, DELAYED RELEASE ORAL at 08:43

## 2023-10-25 RX ADMIN — CARVEDILOL 25 MG: 12.5 TABLET, FILM COATED ORAL at 17:15

## 2023-10-25 RX ADMIN — AMLODIPINE BESYLATE 10 MG: 10 TABLET ORAL at 08:43

## 2023-10-25 RX ADMIN — VENLAFAXINE HYDROCHLORIDE 150 MG: 150 CAPSULE, EXTENDED RELEASE ORAL at 08:43

## 2023-10-25 RX ADMIN — ICOSAPENT ETHYL 1 G: 1 CAPSULE ORAL at 08:43

## 2023-10-25 ASSESSMENT — PAIN SCALES - GENERAL
PAINLEVEL_OUTOF10: 0 - NO PAIN

## 2023-10-25 ASSESSMENT — COLUMBIA-SUICIDE SEVERITY RATING SCALE - C-SSRS
2. HAVE YOU ACTUALLY HAD ANY THOUGHTS OF KILLING YOURSELF?: NO
6. HAVE YOU EVER DONE ANYTHING, STARTED TO DO ANYTHING, OR PREPARED TO DO ANYTHING TO END YOUR LIFE?: NO
6. HAVE YOU EVER DONE ANYTHING, STARTED TO DO ANYTHING, OR PREPARED TO DO ANYTHING TO END YOUR LIFE?: NO
1. SINCE LAST CONTACT, HAVE YOU WISHED YOU WERE DEAD OR WISHED YOU COULD GO TO SLEEP AND NOT WAKE UP?: NO
6. HAVE YOU EVER DONE ANYTHING, STARTED TO DO ANYTHING, OR PREPARED TO DO ANYTHING TO END YOUR LIFE?: NO
1. SINCE LAST CONTACT, HAVE YOU WISHED YOU WERE DEAD OR WISHED YOU COULD GO TO SLEEP AND NOT WAKE UP?: NO
1. SINCE LAST CONTACT, HAVE YOU WISHED YOU WERE DEAD OR WISHED YOU COULD GO TO SLEEP AND NOT WAKE UP?: NO

## 2023-10-25 ASSESSMENT — PAIN - FUNCTIONAL ASSESSMENT: PAIN_FUNCTIONAL_ASSESSMENT: 0-10

## 2023-10-25 NOTE — GROUP NOTE
Group Topic: Music Therapy   Group Date: 10/25/2023  Start Time: 1000  End Time: 1100  Facilitators: ROOSEVELT Kelley; Mandi Payan   Department: [facility]IRT[facility]    Number of Participants: 7   Group Focus: music therapy  Treatment Modality: Music Therapy  Interventions Utilized were: active music engagement, other movement to music, passive music engagement, songwriting/composition, and support      Name: Madalyn Ricci YOB: 1940   MR: [id number]      Level of Participation: active  Quality of Participation: appropriate/pleasant, attentive, and engaged  Interactions with others: appropriate, gave feedback, supportive, asked thoughtful questions, and offered helpful suggestions  Mood/Affect: appropriate and positive  Cognition, Pre Treatment: attentive  Cognition, Post Treatment: attentive and goal directed  Progress: Moderate  Plan: continue with services

## 2023-10-25 NOTE — GROUP NOTE
Group Topic: Other   Group Date: 10/25/2023  Start Time: 1100  End Time: 1140  Facilitators: JENNIFER Gramajo   Department: [facility] REHAB[facility]IRT[facility]    Number of Participants: 5   Group Focus: other Let's talk  Treatment Modality: Other: Recreation Therapy  Interventions utilized were exploration, mental fitness, and reminiscence  Purpose: feelings    Name: Madalyn Ricci YOB: 1940   MR: [id number]      Facilitator: Recreational Therapist  Level of Participation: active  Quality of Participation: appropriate/pleasant, attention seeking, and cooperative  Interactions with others: appropriate  Mood/Affect: appropriate  Triggers (if applicable): n/a  Cognition: coherent/clear  Progress: Moderate  Comments: pt problem is depressed mood.   Plan: continue with services

## 2023-10-25 NOTE — CARE PLAN
Problem: Potential for Harm to Self or Others  Goal: Participates in unit activities  Outcome: Met   The patient's goals for the shift include No falls/ medication compliance/ participate in groups    The clinical goals for the shift include No falls/ medication compliance/ participate in groups    Over the shift, the patient did not make progress toward the following goals. Barriers to progression include pt attended groups today. Recommendations to address these barriers include continue to encourage milieu participation.

## 2023-10-25 NOTE — CARE PLAN
The patient's goals for the shift include go home    The clinical goals for the shift include no falls    Over the shift, the patient did not make progress toward the following goals. Barriers to progression include isolating at times to room. Recommendations to address these barriers include [facility].

## 2023-10-25 NOTE — NURSING NOTE
WISAM NOTE     Problem:  depression     Behavior:  Pt pleasant and cooperative, makes needs known, is med compliant and has a good appetite. Pt more out of her room this shift and smiles often.  Group Participation: yes  Appetite/Meals: good       Interventions:  Administered scheduled medications    Response:  Pt continues as above     Plan:  Continue q15 minute checks and maintain pt safety

## 2023-10-25 NOTE — PROGRESS NOTES
Physical Therapy    Physical Therapy Treatment    Patient Name: Madalyn Ricci  MRN: [id number]  Today's Date: 10/25/2023  Time Calculation  Start Time: 1440  Stop Time: 1505  Time Calculation (min): 25 min       Assessment/Plan   PT Assessment  PT Assessment Results: Decreased strength, Decreased endurance, Impaired balance, Decreased mobility, Decreased safety awareness, Impaired sensation, Obesity  Rehab Prognosis: Good  Evaluation/Treatment Tolerance: Patient tolerated treatment well  Medical Staff Made Aware: Yes  Strengths: Ability to acquire knowledge, Attitude of self  End of Session Communication: Bedside nurse  Assessment Comment: Patient demonstrates improvement in gait distance and mobility. Patient requires supervision overall for safe mobility.  End of Session Patient Position: Up in chair  PT Plan  Inpatient/Swing Bed or Outpatient: Inpatient  PT Plan  Treatment/Interventions: Bed mobility, Transfer training, Gait training, Balance training, Neuromuscular re-education, Strengthening, Endurance training, Therapeutic exercise, Therapeutic activity, Postural re-education  PT Plan: Skilled PT  PT Frequency: 4 times per week  PT Discharge Recommendations: Moderate intensity level of continued care      General Visit Information:   PT  Visit  PT Received On: 10/25/23  General  Reason for Referral: impaired mobility  Referred By: Dr. Brooke  Past Medical History Relevant to Rehab: HTN, hyperlipidemia, GERD, DM, neuropathy  Missed Visit: Yes  Missed Visit Reason: Other (Comment) (attempted PT evaluation, however, patient is meeting with behavioral health NP at this time.)  Patient Position Received: Up in chair  Preferred Learning Style: auditory, verbal  General Comment: pleasant, cooperative, motivated for PT    Subjective   Precautions:  Precautions  Medical Precautions: Fall precautions  Vital Signs:       Objective   Pain:  Pain Assessment  Pain Assessment: 0-10  Pain Score: 0 - No  pain  Cognition:  Cognition  Overall Cognitive Status: Within Functional Limits  Orientation Level: Oriented X4  Postural Control:  Postural Control  Posture Comment: age related changes, rounded shoulders, morbidly obese  Extremity/Trunk Assessments:  RLE   RLE : Within Functional Limits (for ROM)  LLE   LLE : Within Functional Limits (for ROM)  Activity Tolerance:  Activity Tolerance  Endurance: Decreased tolerance for upright activites  Treatments:  Therapeutic Exercise  Therapeutic Exercise Performed: Yes  Therapeutic Exercise Activity 1: Standing toe raises, hip abduction, hip flexion at RW CGA. Mini squats x 10 reps CGA.              Ambulation/Gait Training  Ambulation/Gait Training Performed: Yes  Ambulation/Gait Training 1  Surface 1: Level tile  Device 1: Rolling walker  Assistance 1: Close supervision  Quality of Gait 1: Wide base of support  Comments/Distance (ft) 1: 50 ft x 3 with RW, fatigues with activity  Transfers  Transfer: Yes  Transfer 1  Technique 1: Sit to stand, Stand to sit  Transfer Device 1: Walker  Transfer Level of Assistance 1: Close supervision  Trials/Comments 1: v.c.'s for hand placement, safe technique, to slow pace, moves (quickly and has episode of loss of balance)    Wheelchair Activities  Propulsion: Yes  Propulsion Type 1: Manual  Level 1: Level tile  Level of Assistance 1: Independent    Outcome Measures:  [facility] Basic Mobility  Turning from your back to your side while in a flat bed without using bedrails: A little  Moving from lying on your back to sitting on the side of a flat bed without using bedrails: A little  Moving to and from bed to chair (including a wheelchair): A little  Standing up from a chair using your arms (e.g. wheelchair or bedside chair): A little  To walk in hospital room: A little  Climbing 3-5 steps with railing: A lot  Basic Mobility - Total Score: 17    Education Documentation  No documentation found.  Education Comments  No comments found.        OP  EDUCATION:  Education  Education Comment: Education provided on safe mobility techniques and techniques to decrease fall risk..    Encounter Problems       Encounter Problems (Active)       Balance       LTG - Patient will maintain good dynamic balance to allow for safe mobility with RW. (Progressing)       Start:  10/18/23    Expected End:  11/01/23               Mobility       LTG - Patient will [id number] ft or greater mod Independent.  (Progressing)       Start:  10/18/23    Expected End:  11/01/23               Transfers       LTG - Transfer from bed to chair mod Independent (Progressing)       Start:  10/18/23    Expected End:  11/01/23            LTG - Patient will perform bed mobility mod Independent. (Progressing)       Start:  10/18/23    Expected End:  11/01/23            LTG - Patient will transfer sit to and from stand mod Independent. (Progressing)       Start:  10/18/23    Expected End:  11/01/23                  Encounter Problems (Resolved)       Transfers       Pt will transfer in/out of shower with grab bar and modified independent assist.  (Adequate for Discharge)       Start:  10/18/23    Expected End:  11/08/23    Resolved:  10/21/23

## 2023-10-25 NOTE — PROGRESS NOTES
Madalyn Ricci is a 83 y.o. female on day 8 of admission presenting with Mood disorder (CMS/HCC).      Patient is an 84yo female admitted for Mood Disorder.    Case discussed in morning team.    Vitals signs reviewed  Patient case was discussed in morning team.  She slept 8hs appetite is good, mood is good. Taking meds as ordered. No prns required. She is attending groups.  Interviewed in common area today. Social work is looking into discharge plan. [facility] was accepted and social work is attempting to find SNF placement. In November she will get her social security check and will hopefully be able to pay for assisted living if accomodations are not found before then.    Past Medical History  She has a past medical history of Diabetes mellitus (CMS/HCC) and Hypertension.    Past Psychiatric History:   Previous therapy: no  Previous psychiatric treatment and medication trials: no  Previous psychiatric hospitalizations: no  Previous diagnoses: no  Previous suicide attempts: no  History of violence: no  Currently in treatment with N/A.  Depression screening was performed with standardized tool: Yes, Depression    Surgical History  She has a past surgical history that includes Lung lobectomy (10/13/2015); Hysterectomy (10/13/2015); Cholecystectomy (10/13/2015); Appendectomy (10/13/2015); Hemicolectomy (10/13/2015); Cataract extraction (10/13/2015); and CT guided imaging for needle placement (2/4/2015).     Social History  She reports that she has never smoked. She has never used smokeless tobacco. No history on file for alcohol use and drug use.     Allergies  Patient has no known allergies.    Review of Systems    Psychiatric ROS - Adult  Anxiety: Mild  Depression: negative  Delirium: negative  Psychosis: negative  Margret: negative  Safety Issues: none  Psychiatric ROS Comment: The patient states that she is not suicidal, she did  and denies AVH. The patient states that she is not depressed and the only concern now is  "a caring and safe environment for her to live on.    Physical Exam      Mental Status Exam  General: NAD  Appearance: Hospital attire  Attitude: Accepting  Behavior: tearful  Motor Activity: in wheelchair  Speech: Verbose  Mood: Sad, Labile  Affect: more open  Thought Process: Loose associations   Thought Content: no delusions eleicted, denies SI/HI  Thought Perception: denies AH/VH/paranoia  Cognition: fair  Insight: fair  Judgement: fair    Psychiatric Risk Assessment  Violence Risk Assessment: none  Acute Risk of Harm to Others is Considered: low   Suicide Risk Assessment: age > 65 yrs old, , chronic medical illness, history of trauma or abuse, and living alone or lack of social support  Protective Factors against Suicide: fear of suicide and Yarsanism affiliation/spirituality  Acute Risk of Harm to Self is Considered: low    Last Recorded Vitals  Blood pressure (!) 128/93, pulse 84, temperature 36.1 °C (97 °F), temperature source Temporal, resp. rate 17, height 1.626 m (5' 4.02\"), weight 127 kg (278 lb 15.9 oz), SpO2 99 %.    Relevant Results      Current Facility-Administered Medications:     acetaminophen (Tylenol) tablet 650 mg, 650 mg, oral, q4h PRN, Navin Brooke DO, 650 mg at 10/21/23 0910    alum-mag hydroxide-simeth (Mylanta) 200-200-20 mg/5 mL oral suspension 30 mL, 30 mL, oral, q6h PRN, Navin Brooke DO    amLODIPine (Norvasc) tablet 10 mg, 10 mg, oral, Daily, Osiel Forman MD, 10 mg at 10/25/23 0843    atorvastatin (Lipitor) tablet 40 mg, 40 mg, oral, Nightly, Osiel Forman MD, 40 mg at 10/24/23 2119    carvedilol (Coreg) tablet 25 mg, 25 mg, oral, BID with meals, Osiel Forman MD, 25 mg at 10/25/23 0843    dextrose 10 % in water (D10W) infusion, 0.3 g/kg/hr, intravenous, Once PRN, Osiel Forman MD    dextrose 50 % injection 25 g, 25 g, intravenous, q15 min PRN, Osiel Forman MD    diphenhydrAMINE (BENADryl) capsule 50 mg, 50 mg, oral, q6h PRN **OR** " diphenhydrAMINE (BENADryl) injection 50 mg, 50 mg, intramuscular, Once PRN, Navin Brooke DO    glucagon (Glucagen) injection 1 mg, 1 mg, intramuscular, q15 min PRN, Osiel Forman MD    icosapent ethyL (Vascepa) capsule 1 g, 1 g, oral, BID with meals, Osiel Forman MD, 1 g at 10/25/23 0843    insulin glargine (Lantus) injection 40 Units, 40 Units, subcutaneous, Nightly, Osiel Forman MD, 40 Units at 10/24/23 2121    latanoprost (Xalatan) 0.005 % ophthalmic solution 1 drop, 1 drop, Both Eyes, Nightly, Osiel Forman MD, 1 drop at 10/24/23 2121    losartan (Cozaar) tablet 100 mg, 100 mg, oral, Daily, Osiel Forman MD, 100 mg at 10/25/23 0843    magnesium hydroxide (Milk of Magnesia) 400 mg/5 mL suspension 30 mL, 30 mL, oral, Daily PRN, Navin Brooke DO    magnesium oxide (Mag-Ox) tablet 400 mg, 400 mg, oral, Daily, Osiel Forman MD, 400 mg at 10/25/23 0843    OLANZapine (ZyPREXA) tablet 5 mg, 5 mg, oral, q6h PRN **OR** OLANZapine (ZyPREXA) injection 5 mg, 5 mg, intramuscular, q6h PRN, Navin Brooke DO    pantoprazole (ProtoNix) EC tablet 40 mg, 40 mg, oral, Daily before breakfast, Osiel Forman MD, 40 mg at 10/25/23 0843    traZODone (Desyrel) tablet 25 mg, 25 mg, oral, Nightly PRN, Navin Brooke DO    venlafaxine XR (Effexor-XR) 24 hr capsule 150 mg, 150 mg, oral, Daily, Navin Brooke DO, 150 mg at 10/25/23 0843      Assessment/Plan   Principal Problem:    Mood disorder (CMS/[facility])  Active Problems:    Hyperlipidemia    GERD (gastroesophageal reflux disease)    Type 2 diabetes mellitus without complication, with long-term current use of insulin (CMS/[facility])    Primary hypertension    Continue meds as ordered.   Continue daily rounds.   Continue to encourage scheduled medication.  Encourage participation in group therapy.  Appreciate social work arranging placement in assisted living          Medication Consent  Medication Consent: risks, benefits, side  effects reviewed for all ordered medications    I spent 30  minutes in the professional and overall care of this patient.        Madalyn Ricci is a 83 y.o. female on day 8 of admission presenting with Mood disorder (CMS/[facility]).

## 2023-10-25 NOTE — PROGRESS NOTES
"Social Work Note    [facility] faxed referral over to [facility] for SNF placement. [facility] is still reviewing documents and will update [facility] if pt is accepted. [facility] informed [facility] and they confirmed that they will continue to follow pt at SNF to help coordinate placement after pt completes SNF.  "

## 2023-10-25 NOTE — GROUP NOTE
Group Topic: Other   Group Date: 10/25/2023  Start Time: 1515  End Time: 1600  Facilitators: JENNIFER Gramajo   Department: [facility] REHAB[facility] VIRTUAL    Number of Participants: 5   Group Focus: other Life questions  Treatment Modality: Other: Recreation Therapy  Interventions utilized were exploration, mental fitness, reminiscence, and story telling  Purpose: feelings    Name: Madalyn Ricci YOB: 1940   MR: [id number]      Facilitator: Recreational Therapist  Level of Participation: moderate  Quality of Participation: appropriate/pleasant, attentive, and cooperative  Interactions with others: appropriate  Mood/Affect: appropriate  Triggers (if applicable): n/a  Cognition: coherent/clear  Progress: Moderate  Comments: pt problem is depressed mood.    Plan: continue with services

## 2023-10-25 NOTE — PROGRESS NOTES
[facility]: [facility]  PROGRESS NOTE      Madalyn Ricci is a 83 y.o. female that is being seen  today for follow-up at [facility].  No chief complaint on file..  Subjective   Patient is being seen for follow-up of [facility].  Patient's blood pressure has been better controlled.  Blood sugars have been stable.  No recent falls or episodes of agitation.  Patient is usually in the wheelchair since she has difficulty with walking.  Denies any other symptoms.      ROS  Negative for fever or chills  Negative for sore throat, ear pain, nasal discharge  Negative for cough, shortness of breath or wheezing  Negative for chest pain, palpitations, swelling of legs  Negative for abdominal pain, constipation, diarrhea, blood in the stools  Negative for urinary complaints  Negative for headache, dizziness, weakness or numbness  Negative for joint pain.  Positive for difficulty in walking  Positive for anxiety  All other systems reviewed and were negative   Vitals:    10/25/23 0839   BP: 149/69   Pulse: 69   Resp:    Temp:    SpO2:       Physical Exam  Constitutional: Patient does not appear to be in any acute distress  Head and Face: NCAT  Eyes: Normal external exam, EOMI  ENT: Normal external inspection of ears and nose. Oropharynx normal.  Cardiovascular: RRR, S1/S2, no murmurs, rubs, or gallops, radial pulses +2, no edema of extremities  Pulmonary: CTAB, no respiratory distress.  Abdomen: +BS, soft, non-tender, nondistended, no guarding or rebound, no masses noted  MSK: No joint swelling, normal movements of all extremities. Range of motion- normal.  Skin- No lesions, contusions, or erythema.  Peripheral puslses palpable bilaterally 2+  Neuro: AAO X3, Cranial nerves 2-12 grossly intact,DTR 2+ in all 4 limbs   Psychiatric: Judgment intact. Appropriate mood and behavior    Diagnostic Results   Lab Results   Component Value Date    GLUCOSE 199 (H) 10/16/2023    CALCIUM 9.3 10/16/2023     10/16/2023     K 3.7 10/16/2023    CO2 26 10/16/2023     (H) 10/16/2023    BUN 31 (H) 10/16/2023    CREATININE 1.20 10/16/2023     Lab Results   Component Value Date    ALT 11 10/16/2023    AST 16 10/16/2023    ALKPHOS 74 10/16/2023    BILITOT 0.3 10/16/2023     Lab Results   Component Value Date    WBC 6.4 10/16/2023    HGB 14.1 10/16/2023    HCT 44.1 10/16/2023    MCV 90 10/16/2023     10/16/2023     Lab Results   Component Value Date    CHOL 138 06/26/2020    CHOL 142 09/20/2019    CHOL 131 (L) 03/05/2019     Lab Results   Component Value Date    HDL 36 (L) 06/26/2020    HDL 36 (L) 09/20/2019    HDL 37 (L) 03/05/2019     Lab Results   Component Value Date    LDLCALC 44 (L) 06/26/2020    LDLCALC 36 (L) 09/20/2019    LDLCALC 49 (L) 03/05/2019     Lab Results   Component Value Date    TRIG 291 (H) 06/26/2020    TRIG 350 (H) 09/20/2019    TRIG 224 (H) 03/05/2019     Lab Results   Component Value Date    HGBA1C 6.4 (H) 12/12/2020     Other labs not included in the list above were reviewed either before or during this encounter.    History    Past Medical History:   Diagnosis Date    Diabetes mellitus (CMS/HCC)     Hypertension      Past Surgical History:   Procedure Laterality Date    APPENDECTOMY  10/13/2015    Appendectomy    CATARACT EXTRACTION  10/13/2015    Cataract Surgery    CHOLECYSTECTOMY  10/13/2015    Cholecystectomy    CT GUIDED IMAGING FOR NEEDLE PLACEMENT  2/4/2015    CT GUIDED IMAGING FOR NEEDLE PLACEMENT [facility]    HEMICOLECTOMY  10/13/2015    Hemicolectomy    HYSTERECTOMY  10/13/2015    Hysterectomy    LUNG LOBECTOMY  10/13/2015    Lung Lobectomy     No family history on file.  No Known Allergies  No current facility-administered medications on file prior to encounter.     Current Outpatient Medications on File Prior to Encounter   Medication Sig Dispense Refill    amLODIPine (Norvasc) 10 mg tablet Take 1 tablet (10 mg) by mouth once daily.      atorvastatin (Lipitor) 40 mg tablet Take 1  "tablet (40 mg) by mouth once daily.      carvedilol (Coreg) 25 mg tablet Take 1 tablet (25 mg) by mouth 2 times a day with meals.      [] cefdinir (Omnicef) 300 mg capsule Take 1 capsule (300 mg) by mouth 2 times a day for 7 days. 14 capsule 0    icosapent ethyL (Vascepa) 1 gram capsule Take 1 capsule (1 g) by mouth 2 times a day with meals.      insulin aspart (NovoLOG U-100 Insulin aspart) 100 unit/mL injection Inject 100 Units under the skin 3 times a day before meals. Take as directed per insulin instructions.      insulin degludec (Tresiba FlexTouch U-200) 200 unit/mL (3 mL) injection Inject 40 Units under the skin once daily at bedtime. Take as directed per insulin instructions.      latanoprost (Xalatan) 0.005 % ophthalmic solution Administer 1 drop into both eyes once daily at bedtime.      losartan (Cozaar) 50 mg tablet Take 2 tablets (100 mg) by mouth once daily.      magnesium oxide (Mag-Ox) 400 mg tablet Take 1 tablet (400 mg) by mouth once daily.      omeprazole (PriLOSEC) 40 mg DR capsule Take 1 capsule (40 mg) by mouth once daily in the morning. Take before meals. Do not crush or chew.      pen needle, diabetic (BD Traci 2nd Gen Pen Needle) 32 gauge x 5/32\" needle       potassium chloride ER (Micro-K) 10 mEq ER capsule Take 2 capsules (20 mEq) by mouth once daily. Do not crush or chew.      venlafaxine XR (Effexor-XR) 150 mg 24 hr capsule Take 1 capsule (150 mg) by mouth once daily. Do not crush or chew.      [DISCONTINUED] ferrous sulfate 325 (65 Fe) MG EC tablet Take 65 mg by mouth 3 times a day with meals. Do not crush, chew, or split.      [DISCONTINUED] fluticasone propion-salmeteroL (Advair) 115-21 mcg/actuation inhaler Inhale 2 puffs 2 times a day. Rinse mouth with water after use to reduce aftertaste and incidence of candidiasis. Do not swallow.      [DISCONTINUED] hydroCHLOROthiazide (HYDRODiuril) 50 mg tablet Take 1 tablet (50 mg) by mouth once daily.      [DISCONTINUED] vitamin E 450 " mg (1000 unit) capsule Take 100 Units by mouth once daily.         There is no immunization history on file for this patient.  Patient's medical history was reviewed and updated either before or during this encounter.  ASSESSMENT / PLAN:  Active Hospital Problems    Hyperlipidemia      GERD (gastroesophageal reflux disease)      Type 2 diabetes mellitus without complication, with long-term current use of insulin (CMS/[facility])      Primary hypertension      *Mood disorder (CMS/[facility])    Patient's blood pressure is fairly controlled.  Blood sugars have been fairly controlled.  Denies any significant complaints.  Patient is on statins.  Patient is being seen by geropsych team.        Osiel Forman MD

## 2023-10-26 LAB
GLUCOSE BLD MANUAL STRIP-MCNC: 125 MG/DL (ref 74–99)
GLUCOSE BLD MANUAL STRIP-MCNC: 153 MG/DL (ref 74–99)
GLUCOSE BLD MANUAL STRIP-MCNC: 154 MG/DL (ref 74–99)
GLUCOSE BLD MANUAL STRIP-MCNC: 158 MG/DL (ref 74–99)

## 2023-10-26 PROCEDURE — 82947 ASSAY GLUCOSE BLOOD QUANT: CPT

## 2023-10-26 PROCEDURE — 1140000001 HC PRIVATE PSYCH ROOM DAILY

## 2023-10-26 PROCEDURE — 2500000001 HC RX 250 WO HCPCS SELF ADMINISTERED DRUGS (ALT 637 FOR MEDICARE OP): Performed by: PSYCHIATRY & NEUROLOGY

## 2023-10-26 PROCEDURE — 2500000001 HC RX 250 WO HCPCS SELF ADMINISTERED DRUGS (ALT 637 FOR MEDICARE OP): Performed by: INTERNAL MEDICINE

## 2023-10-26 PROCEDURE — 2500000004 HC RX 250 GENERAL PHARMACY W/ HCPCS (ALT 636 FOR OP/ED): Performed by: INTERNAL MEDICINE

## 2023-10-26 PROCEDURE — 99232 SBSQ HOSP IP/OBS MODERATE 35: CPT | Performed by: REGISTERED NURSE

## 2023-10-26 RX ADMIN — AMLODIPINE BESYLATE 10 MG: 10 TABLET ORAL at 08:21

## 2023-10-26 RX ADMIN — CARVEDILOL 25 MG: 12.5 TABLET, FILM COATED ORAL at 18:03

## 2023-10-26 RX ADMIN — ICOSAPENT ETHYL 1 G: 1 CAPSULE ORAL at 08:21

## 2023-10-26 RX ADMIN — INSULIN GLARGINE 40 UNITS: 100 INJECTION, SOLUTION SUBCUTANEOUS at 20:38

## 2023-10-26 RX ADMIN — PANTOPRAZOLE SODIUM 40 MG: 40 TABLET, DELAYED RELEASE ORAL at 06:27

## 2023-10-26 RX ADMIN — LOSARTAN POTASSIUM 100 MG: 50 TABLET, FILM COATED ORAL at 08:21

## 2023-10-26 RX ADMIN — ICOSAPENT ETHYL 1 G: 1 CAPSULE ORAL at 18:03

## 2023-10-26 RX ADMIN — VENLAFAXINE HYDROCHLORIDE 150 MG: 150 CAPSULE, EXTENDED RELEASE ORAL at 08:21

## 2023-10-26 RX ADMIN — ATORVASTATIN CALCIUM 40 MG: 40 TABLET, FILM COATED ORAL at 20:38

## 2023-10-26 RX ADMIN — LATANOPROST 1 DROP: 50 SOLUTION OPHTHALMIC at 20:38

## 2023-10-26 RX ADMIN — Medication 400 MG: at 08:21

## 2023-10-26 RX ADMIN — CARVEDILOL 25 MG: 12.5 TABLET, FILM COATED ORAL at 08:21

## 2023-10-26 ASSESSMENT — PAIN - FUNCTIONAL ASSESSMENT
PAIN_FUNCTIONAL_ASSESSMENT: 0-10

## 2023-10-26 ASSESSMENT — PAIN SCALES - GENERAL
PAINLEVEL_OUTOF10: 0 - NO PAIN

## 2023-10-26 ASSESSMENT — COLUMBIA-SUICIDE SEVERITY RATING SCALE - C-SSRS
2. HAVE YOU ACTUALLY HAD ANY THOUGHTS OF KILLING YOURSELF?: NO
2. HAVE YOU ACTUALLY HAD ANY THOUGHTS OF KILLING YOURSELF?: NO
6. HAVE YOU EVER DONE ANYTHING, STARTED TO DO ANYTHING, OR PREPARED TO DO ANYTHING TO END YOUR LIFE?: NO
2. HAVE YOU ACTUALLY HAD ANY THOUGHTS OF KILLING YOURSELF?: NO
1. SINCE LAST CONTACT, HAVE YOU WISHED YOU WERE DEAD OR WISHED YOU COULD GO TO SLEEP AND NOT WAKE UP?: NO
1. SINCE LAST CONTACT, HAVE YOU WISHED YOU WERE DEAD OR WISHED YOU COULD GO TO SLEEP AND NOT WAKE UP?: NO
6. HAVE YOU EVER DONE ANYTHING, STARTED TO DO ANYTHING, OR PREPARED TO DO ANYTHING TO END YOUR LIFE?: NO
6. HAVE YOU EVER DONE ANYTHING, STARTED TO DO ANYTHING, OR PREPARED TO DO ANYTHING TO END YOUR LIFE?: NO
1. SINCE LAST CONTACT, HAVE YOU WISHED YOU WERE DEAD OR WISHED YOU COULD GO TO SLEEP AND NOT WAKE UP?: NO

## 2023-10-26 NOTE — CARE PLAN
The patient's goals for the shift include to get help    The clinical goals for the shift include no falls    Over the shift, the patient did not make progress toward the following goals. Barriers to progression include isolating to room. Recommendations to address these barriers include encourage pt to come out of her room more often and engage with staff and peers, let staff if needing any assistance.

## 2023-10-26 NOTE — PROGRESS NOTES
Madalyn Ricci is a 83 y.o. female on day 9 of admission presenting with Mood disorder (CMS/HCC).      Patient is an 84yo female admitted for Mood Disorder.    Case discussed in morning team.    Vitals signs reviewed  Patient case was discussed in morning team.  She slept 8hs appetite is good, mood is good. Taking meds as ordered. No prns required. She is attending groups.  Interviewed in common area today. Social work is looking into discharge plan. [facility] was accepted and social work is attempting to find SNF placement. [facility] is reviewingIn November she will get her social security check and will hopefully be able to pay for assisted living if accomodations are not found before then.    Past Medical History  She has a past medical history of Diabetes mellitus (CMS/[facility]) and Hypertension.    Past Psychiatric History:   Previous therapy: no  Previous psychiatric treatment and medication trials: no  Previous psychiatric hospitalizations: no  Previous diagnoses: no  Previous suicide attempts: no  History of violence: no  Currently in treatment with N/A.  Depression screening was performed with standardized tool: Yes, Depression    Surgical History  She has a past surgical history that includes Lung lobectomy (10/13/2015); Hysterectomy (10/13/2015); Cholecystectomy (10/13/2015); Appendectomy (10/13/2015); Hemicolectomy (10/13/2015); Cataract extraction (10/13/2015); and CT guided imaging for needle placement (2/4/2015).     Social History  She reports that she has never smoked. She has never used smokeless tobacco. No history on file for alcohol use and drug use.     Allergies  Patient has no known allergies.    Review of Systems    Psychiatric ROS - Adult  Anxiety: Mild  Depression: negative  Delirium: negative  Psychosis: negative  Margret: negative  Safety Issues: none  Psychiatric ROS Comment: The patient states that she is not suicidal, she did  and denies AVH. The patient states that she is not depressed and the  "only concern now is a caring and safe environment for her to live on.    Physical Exam      Mental Status Exam  General: NAD  Appearance: Hospital attire  Attitude: Accepting  Behavior: cooperative  Motor Activity: using walker  Speech: normal rate and volume  Mood: improving  Affect: more open  Thought Process: more organized  Thought Content: no delusions elicited, denies SI/HI  Thought Perception: denies AH/VH/paranoia  Cognition: fair  Insight: fair  Judgement: fair    Psychiatric Risk Assessment  Violence Risk Assessment: none  Acute Risk of Harm to Others is Considered: low   Suicide Risk Assessment: age > 65 yrs old, , chronic medical illness, history of trauma or abuse, and living alone or lack of social support  Protective Factors against Suicide: fear of suicide and Adventist affiliation/spirituality  Acute Risk of Harm to Self is Considered: low    Last Recorded Vitals  Blood pressure (!) 128/93, pulse 84, temperature 36.1 °C (97 °F), temperature source Temporal, resp. rate 17, height 1.626 m (5' 4.02\"), weight 127 kg (278 lb 15.9 oz), SpO2 99 %.    Relevant Results      Current Facility-Administered Medications:     acetaminophen (Tylenol) tablet 650 mg, 650 mg, oral, q4h PRN, Navin Brooke DO, 650 mg at 10/21/23 0910    alum-mag hydroxide-simeth (Mylanta) 200-200-20 mg/5 mL oral suspension 30 mL, 30 mL, oral, q6h PRN, Navin Brooke DO    amLODIPine (Norvasc) tablet 10 mg, 10 mg, oral, Daily, Osiel Forman MD, 10 mg at 10/26/23 0821    atorvastatin (Lipitor) tablet 40 mg, 40 mg, oral, Nightly, Osiel Forman MD, 40 mg at 10/25/23 2032    carvedilol (Coreg) tablet 25 mg, 25 mg, oral, BID with meals, Osiel Forman MD, 25 mg at 10/26/23 0821    dextrose 10 % in water (D10W) infusion, 0.3 g/kg/hr, intravenous, Once PRN, Osiel Forman MD    dextrose 50 % injection 25 g, 25 g, intravenous, q15 min PRN, Osiel Forman MD    diphenhydrAMINE (BENADryl) capsule 50 mg, " 50 mg, oral, q6h PRN **OR** diphenhydrAMINE (BENADryl) injection 50 mg, 50 mg, intramuscular, Once PRN, Navin Brooke DO    glucagon (Glucagen) injection 1 mg, 1 mg, intramuscular, q15 min PRN, Osiel Forman MD    icosapent ethyL (Vascepa) capsule 1 g, 1 g, oral, BID with meals, Osiel Forman MD, 1 g at 10/26/23 0821    insulin glargine (Lantus) injection 40 Units, 40 Units, subcutaneous, Nightly, Osiel Forman MD, 40 Units at 10/25/23 2033    latanoprost (Xalatan) 0.005 % ophthalmic solution 1 drop, 1 drop, Both Eyes, Nightly, Osiel Forman MD, 1 drop at 10/25/23 2032    losartan (Cozaar) tablet 100 mg, 100 mg, oral, Daily, Osiel Forman MD, 100 mg at 10/26/23 0821    magnesium hydroxide (Milk of Magnesia) 400 mg/5 mL suspension 30 mL, 30 mL, oral, Daily PRN, Navin Brooke DO    magnesium oxide (Mag-Ox) tablet 400 mg, 400 mg, oral, Daily, Osiel Forman MD, 400 mg at 10/26/23 0821    OLANZapine (ZyPREXA) tablet 5 mg, 5 mg, oral, q6h PRN **OR** OLANZapine (ZyPREXA) injection 5 mg, 5 mg, intramuscular, q6h PRN, Navin Brooke DO    pantoprazole (ProtoNix) EC tablet 40 mg, 40 mg, oral, Daily before breakfast, Osiel Forman MD, 40 mg at 10/26/23 0627    traZODone (Desyrel) tablet 25 mg, 25 mg, oral, Nightly PRN, Navin Brooke DO    venlafaxine XR (Effexor-XR) 24 hr capsule 150 mg, 150 mg, oral, Daily, Navin Brooke DO, 150 mg at 10/26/23 0821      Assessment/Plan   Principal Problem:    Mood disorder (CMS/HCC)  Active Problems:    Hyperlipidemia    GERD (gastroesophageal reflux disease)    Type 2 diabetes mellitus without complication, with long-term current use of insulin (CMS/[facility])    Primary hypertension    Continue meds as ordered.   Continue daily rounds.   Continue to encourage scheduled medication.  Encourage participation in group therapy.  Appreciate social work arranging placement in assisted living   Provider is attempting to set up peer review  with [employer] for placement.         Medication Consent  Medication Consent: risks, benefits, side effects reviewed for all ordered medications    I spent 30  minutes in the professional and overall care of this patient.        Madalyn Ricci is a 83 y.o. female on day 9 of admission presenting with Mood disorder (CMS/[facility]).Madalyn Ricci is a 83 y.o. female on day 9 of admission presenting with Mood disorder (CMS/[facility]).

## 2023-10-26 NOTE — NURSING NOTE
Assumed care of patient at 1900, patient noted to be lying in bed at that time with no complaints voiced. Call light within reach.

## 2023-10-26 NOTE — NURSING NOTE
BIRP NOTE     Problem:  Depression     Behavior:  Isolative to room  Group Participation: n/a  Appetite/Meals: hs snack       Interventions:  Encourage group activities, interaction with peers.    Response:  Patient remains isolative     Plan:  Continue to monitor behavoirs

## 2023-10-26 NOTE — GROUP NOTE
Group Topic: Reminiscence   Group Date: 10/26/2023  Start Time: 1515  End Time: 1600  Facilitators: JENNIFER Gramajo   Department: [facility] REHABTH VIRTUAL    Number of Participants: 5   Group Focus: mindfulness and reminiscence  Treatment Modality: Other: Recreation therapy  Interventions utilized were exploration, mental fitness, and reminiscence  Purpose: other: increase socialization, elevate mood, increase alertness, enhance self esteem, increase stimulation    Name: Madalyn Ricci YOB: 1940   MR: [id number]      Facilitator: Recreational Therapist  Level of Participation: active  Quality of Participation: appropriate/pleasant, attentive, cooperative, and engaged  Interactions with others: appropriate  Mood/Affect: appropriate  Triggers (if applicable): n/a  Cognition: coherent/clear  Progress: Significant  Comments: pt problem is depressed mood.   Plan: continue with services

## 2023-10-26 NOTE — CARE PLAN
The patient's goals for the shift include go home    The clinical goals for the shift include no falls    Over the shift, the patient did make progress toward the following goals. Barriers to progression include . Recommendations to address these barriers include .

## 2023-10-26 NOTE — NURSING NOTE
WISAM NOTE     Problem:  Depression     Behavior:  Pt pleasant and cooperative with staff and care. Pt islolates to her room this shift, comes out for meals and group. Pt sitting in her room with the lights on and door open.  Group Participation: yes  Appetite/Meals: good       Interventions:  Administered scheduled medications    Response:  Pt continues as above     Plan:  Maintain pt safety

## 2023-10-26 NOTE — GROUP NOTE
Group Topic: Other   Group Date: 10/26/2023  Start Time: 1000  End Time: 1100  Facilitators: JENNIFER Gramajo   Department: [facility] REHABTH VIRTUAL    Number of Participants: 5   Group Focus: other Tickling your Funnybone  Treatment Modality: Other: Recreation Therapy  Interventions utilized were exploration, mental fitness, patient education, and problem solving  Purpose: feelings and self-care    Name: Madalyn Ricci YOB: 1940   MR: [id number]      Facilitator: Recreational Therapist  Level of Participation: active  Quality of Participation: appropriate/pleasant, attentive, and cooperative  Interactions with others: appropriate  Mood/Affect: appropriate  Triggers (if applicable): n/a  Cognition: coherent/clear  Progress: Moderate  Comments: pt problem is depressed mood.    Plan: continue with services

## 2023-10-27 LAB
GLUCOSE BLD MANUAL STRIP-MCNC: 153 MG/DL (ref 74–99)
GLUCOSE BLD MANUAL STRIP-MCNC: 165 MG/DL (ref 74–99)
GLUCOSE BLD MANUAL STRIP-MCNC: 182 MG/DL (ref 74–99)
GLUCOSE BLD MANUAL STRIP-MCNC: 80 MG/DL (ref 74–99)

## 2023-10-27 PROCEDURE — 2500000004 HC RX 250 GENERAL PHARMACY W/ HCPCS (ALT 636 FOR OP/ED): Performed by: INTERNAL MEDICINE

## 2023-10-27 PROCEDURE — 1140000001 HC PRIVATE PSYCH ROOM DAILY

## 2023-10-27 PROCEDURE — 2500000001 HC RX 250 WO HCPCS SELF ADMINISTERED DRUGS (ALT 637 FOR MEDICARE OP): Performed by: PSYCHIATRY & NEUROLOGY

## 2023-10-27 PROCEDURE — 82947 ASSAY GLUCOSE BLOOD QUANT: CPT

## 2023-10-27 PROCEDURE — 2500000001 HC RX 250 WO HCPCS SELF ADMINISTERED DRUGS (ALT 637 FOR MEDICARE OP): Performed by: INTERNAL MEDICINE

## 2023-10-27 PROCEDURE — 99232 SBSQ HOSP IP/OBS MODERATE 35: CPT | Performed by: INTERNAL MEDICINE

## 2023-10-27 PROCEDURE — 99232 SBSQ HOSP IP/OBS MODERATE 35: CPT | Performed by: REGISTERED NURSE

## 2023-10-27 RX ADMIN — CARVEDILOL 25 MG: 12.5 TABLET, FILM COATED ORAL at 08:21

## 2023-10-27 RX ADMIN — VENLAFAXINE HYDROCHLORIDE 150 MG: 150 CAPSULE, EXTENDED RELEASE ORAL at 08:21

## 2023-10-27 RX ADMIN — ICOSAPENT ETHYL 1 G: 1 CAPSULE ORAL at 08:21

## 2023-10-27 RX ADMIN — ATORVASTATIN CALCIUM 40 MG: 40 TABLET, FILM COATED ORAL at 20:49

## 2023-10-27 RX ADMIN — Medication 400 MG: at 08:21

## 2023-10-27 RX ADMIN — LATANOPROST 1 DROP: 50 SOLUTION OPHTHALMIC at 20:50

## 2023-10-27 RX ADMIN — PANTOPRAZOLE SODIUM 40 MG: 40 TABLET, DELAYED RELEASE ORAL at 06:14

## 2023-10-27 RX ADMIN — ICOSAPENT ETHYL 1 G: 1 CAPSULE ORAL at 17:07

## 2023-10-27 RX ADMIN — AMLODIPINE BESYLATE 10 MG: 10 TABLET ORAL at 08:21

## 2023-10-27 RX ADMIN — LOSARTAN POTASSIUM 100 MG: 50 TABLET, FILM COATED ORAL at 08:21

## 2023-10-27 RX ADMIN — CARVEDILOL 25 MG: 12.5 TABLET, FILM COATED ORAL at 17:07

## 2023-10-27 RX ADMIN — INSULIN GLARGINE 40 UNITS: 100 INJECTION, SOLUTION SUBCUTANEOUS at 20:50

## 2023-10-27 ASSESSMENT — PAIN SCALES - GENERAL
PAINLEVEL_OUTOF10: 0 - NO PAIN

## 2023-10-27 ASSESSMENT — COLUMBIA-SUICIDE SEVERITY RATING SCALE - C-SSRS
2. HAVE YOU ACTUALLY HAD ANY THOUGHTS OF KILLING YOURSELF?: NO
1. SINCE LAST CONTACT, HAVE YOU WISHED YOU WERE DEAD OR WISHED YOU COULD GO TO SLEEP AND NOT WAKE UP?: NO
6. HAVE YOU EVER DONE ANYTHING, STARTED TO DO ANYTHING, OR PREPARED TO DO ANYTHING TO END YOUR LIFE?: NO
1. SINCE LAST CONTACT, HAVE YOU WISHED YOU WERE DEAD OR WISHED YOU COULD GO TO SLEEP AND NOT WAKE UP?: NO
2. HAVE YOU ACTUALLY HAD ANY THOUGHTS OF KILLING YOURSELF?: NO
6. HAVE YOU EVER DONE ANYTHING, STARTED TO DO ANYTHING, OR PREPARED TO DO ANYTHING TO END YOUR LIFE?: NO

## 2023-10-27 ASSESSMENT — PAIN - FUNCTIONAL ASSESSMENT
PAIN_FUNCTIONAL_ASSESSMENT: 0-10
PAIN_FUNCTIONAL_ASSESSMENT: 0-10

## 2023-10-27 NOTE — PROGRESS NOTES
[facility]: [facility]  PROGRESS NOTE      Madalyn Ricci is a 83 y.o. female that is being seen  today for follow-up at [facility].  No chief complaint on file..  Subjective   Patient is being seen for follow-up of [facility].  Patient's blood pressure has been better controlled.  Blood sugars have been stable.  No recent falls or episodes of agitation.  Patient is usually in the wheelchair since she has difficulty with walking.  Denies any other symptoms.      ROS  Negative for fever or chills  Negative for sore throat, ear pain, nasal discharge  Negative for cough, shortness of breath or wheezing  Negative for chest pain, palpitations, swelling of legs  Negative for abdominal pain, constipation, diarrhea, blood in the stools  Negative for urinary complaints  Negative for headache, dizziness, weakness or numbness  Negative for joint pain.  Positive for difficulty in walking  Positive for anxiety  All other systems reviewed and were negative   Vitals:    10/27/23 0819   BP: 126/75   Pulse: 71   Resp:    Temp:    SpO2:       Physical Exam  Constitutional: Patient does not appear to be in any acute distress  Head and Face: NCAT  Eyes: Normal external exam, EOMI  ENT: Normal external inspection of ears and nose. Oropharynx normal.  Cardiovascular: RRR, S1/S2, no murmurs, rubs, or gallops, radial pulses +2, no edema of extremities  Pulmonary: CTAB, no respiratory distress.  Abdomen: +BS, soft, non-tender, nondistended, no guarding or rebound, no masses noted  MSK: No joint swelling, normal movements of all extremities. Range of motion- normal.  Skin- No lesions, contusions, or erythema.  Peripheral puslses palpable bilaterally 2+  Neuro: AAO X3, Cranial nerves 2-12 grossly intact,DTR 2+ in all 4 limbs   Psychiatric: Judgment intact. Appropriate mood and behavior    Diagnostic Results   Lab Results   Component Value Date    GLUCOSE 199 (H) 10/16/2023    CALCIUM 9.3 10/16/2023     10/16/2023     K 3.7 10/16/2023    CO2 26 10/16/2023     (H) 10/16/2023    BUN 31 (H) 10/16/2023    CREATININE 1.20 10/16/2023     Lab Results   Component Value Date    ALT 11 10/16/2023    AST 16 10/16/2023    ALKPHOS 74 10/16/2023    BILITOT 0.3 10/16/2023     Lab Results   Component Value Date    WBC 6.4 10/16/2023    HGB 14.1 10/16/2023    HCT 44.1 10/16/2023    MCV 90 10/16/2023     10/16/2023     Lab Results   Component Value Date    CHOL 138 06/26/2020    CHOL 142 09/20/2019    CHOL 131 (L) 03/05/2019     Lab Results   Component Value Date    HDL 36 (L) 06/26/2020    HDL 36 (L) 09/20/2019    HDL 37 (L) 03/05/2019     Lab Results   Component Value Date    LDLCALC 44 (L) 06/26/2020    LDLCALC 36 (L) 09/20/2019    LDLCALC 49 (L) 03/05/2019     Lab Results   Component Value Date    TRIG 291 (H) 06/26/2020    TRIG 350 (H) 09/20/2019    TRIG 224 (H) 03/05/2019     Lab Results   Component Value Date    HGBA1C 6.4 (H) 12/12/2020     Other labs not included in the list above were reviewed either before or during this encounter.    History    Past Medical History:   Diagnosis Date    Diabetes mellitus (CMS/HCC)     Hypertension      Past Surgical History:   Procedure Laterality Date    APPENDECTOMY  10/13/2015    Appendectomy    CATARACT EXTRACTION  10/13/2015    Cataract Surgery    CHOLECYSTECTOMY  10/13/2015    Cholecystectomy    CT GUIDED IMAGING FOR NEEDLE PLACEMENT  2/4/2015    CT GUIDED IMAGING FOR NEEDLE PLACEMENT [facility]    HEMICOLECTOMY  10/13/2015    Hemicolectomy    HYSTERECTOMY  10/13/2015    Hysterectomy    LUNG LOBECTOMY  10/13/2015    Lung Lobectomy     No family history on file.  No Known Allergies  No current facility-administered medications on file prior to encounter.     Current Outpatient Medications on File Prior to Encounter   Medication Sig Dispense Refill    amLODIPine (Norvasc) 10 mg tablet Take 1 tablet (10 mg) by mouth once daily.      atorvastatin (Lipitor) 40 mg tablet Take 1  "tablet (40 mg) by mouth once daily.      carvedilol (Coreg) 25 mg tablet Take 1 tablet (25 mg) by mouth 2 times a day with meals.      [] cefdinir (Omnicef) 300 mg capsule Take 1 capsule (300 mg) by mouth 2 times a day for 7 days. 14 capsule 0    icosapent ethyL (Vascepa) 1 gram capsule Take 1 capsule (1 g) by mouth 2 times a day with meals.      insulin aspart (NovoLOG U-100 Insulin aspart) 100 unit/mL injection Inject 100 Units under the skin 3 times a day before meals. Take as directed per insulin instructions.      insulin degludec (Tresiba FlexTouch U-200) 200 unit/mL (3 mL) injection Inject 40 Units under the skin once daily at bedtime. Take as directed per insulin instructions.      latanoprost (Xalatan) 0.005 % ophthalmic solution Administer 1 drop into both eyes once daily at bedtime.      losartan (Cozaar) 50 mg tablet Take 2 tablets (100 mg) by mouth once daily.      magnesium oxide (Mag-Ox) 400 mg tablet Take 1 tablet (400 mg) by mouth once daily.      omeprazole (PriLOSEC) 40 mg DR capsule Take 1 capsule (40 mg) by mouth once daily in the morning. Take before meals. Do not crush or chew.      pen needle, diabetic (BD Traci 2nd Gen Pen Needle) 32 gauge x 5/32\" needle       potassium chloride ER (Micro-K) 10 mEq ER capsule Take 2 capsules (20 mEq) by mouth once daily. Do not crush or chew.      venlafaxine XR (Effexor-XR) 150 mg 24 hr capsule Take 1 capsule (150 mg) by mouth once daily. Do not crush or chew.      [DISCONTINUED] ferrous sulfate 325 (65 Fe) MG EC tablet Take 65 mg by mouth 3 times a day with meals. Do not crush, chew, or split.      [DISCONTINUED] fluticasone propion-salmeteroL (Advair) 115-21 mcg/actuation inhaler Inhale 2 puffs 2 times a day. Rinse mouth with water after use to reduce aftertaste and incidence of candidiasis. Do not swallow.      [DISCONTINUED] hydroCHLOROthiazide (HYDRODiuril) 50 mg tablet Take 1 tablet (50 mg) by mouth once daily.      [DISCONTINUED] vitamin E 450 " mg (1000 unit) capsule Take 100 Units by mouth once daily.         There is no immunization history on file for this patient.  Patient's medical history was reviewed and updated either before or during this encounter.  ASSESSMENT / PLAN:  Active Hospital Problems    Hyperlipidemia      GERD (gastroesophageal reflux disease)      Type 2 diabetes mellitus without complication, with long-term current use of insulin (CMS/[facility])      Primary hypertension      *Mood disorder (CMS/[facility])    Patient's blood pressure is fairly controlled.  Blood sugars have been fairly controlled.  Denies any significant complaints.  Patient is on statins.  Patient is being seen by geropsych team.        Osiel Forman MD

## 2023-10-27 NOTE — GROUP NOTE
Group Topic: Music Therapy   Group Date: 10/27/2023  Start Time: 1000  End Time: 1100  Facilitators: ROOSEVELT Kelley   Department: [facility]    Number of Participants: 6   Group Focus: music therapy  Treatment Modality: Music Therapy  Interventions Utilized were: active music engagement, empathic listening/validating emotions, reminiscence, sharing/discussion, songwriting/composition, and support      Name: Madalyn Ricci YOB: 1940   MR: [id number]      Level of Participation: active  Quality of Participation: appropriate/pleasant, attentive, and engaged  Interactions with others: appropriate, gave feedback, supportive, asked thoughtful questions, and offered helpful suggestions  Mood/Affect: appropriate, bright, and positive  Cognition, Pre Treatment: attentive  Cognition, Post Treatment: attentive and goal directed  Progress: Moderate  Plan: continue with services

## 2023-10-27 NOTE — PROGRESS NOTES
" REHAB Therapy Assessment & Treatment    Patient Name: Madalyn Ricci  MRN: [id number]  Today's Date: 10/27/2023      Recreation note : Pt is alert and oriented x 3 with some poor insight/judgement.  Attends groups of choice daily and has displayed cooperative behaviors and appropriate mood.  Pt denies SI at this time and does also report feeling\"bhupendra nervous as to what is next\".  Pt able to focus easily and engages appropriately in all groups.  Pt is eating well and sleeping well.  Will continue to encourage pt to attend groups of choice daily.  "

## 2023-10-27 NOTE — GROUP NOTE
Group Topic: Other   Group Date: 10/27/2023  Start Time: 1500  End Time: 1545  Facilitators: JENNIFER Gramajo   Department: [facility]    Number of Participants: 5   Group Focus: other Top 5...  Treatment Modality: Other: RecreationTherapy  Interventions utilized were exploration, mental fitness, reminiscence, and story telling  Purpose: other: increase socialization,  , stimulate memory, elevate mood, express feelings, increase stimulation, enhance self esteem     Name: Madalyn Ricci YOB: 1940   MR: [id number]      Facilitator: Recreational Therapist  Level of Participation: active  Quality of Participation: appropriate/pleasant, attentive, cooperative, and engaged  Interactions with others: appropriate and gave feedback  Mood/Affect: appropriate, brightens with interaction, and positive  Triggers (if applicable): n/a  Cognition: coherent/clear  Progress: Moderate  Comments: pt problem is depressed mood.  Plan: continue with services

## 2023-10-27 NOTE — CARE PLAN
The patient's goals for the shift include go to group    The clinical goals for the shift include no falls    Over the shift, the patient did make progress toward the following goals. Barriers to progression include some mild depressive Sx. But lack of current SI. Recommendations to address these barriers include positive reinforcement, and group/social attendance.      Problem: Fall/Injury  Goal: Verbalize understanding of personal risk factors for fall in the hospital  Outcome: Progressing     Problem: Potential for Harm to Self or Others  Goal: Patient/Family participate in treatment and discharge plans  Outcome: Progressing     Problem: Potential for Harm to Self or Others  Goal: Denies harm toward self or others  Outcome: Progressing

## 2023-10-27 NOTE — GROUP NOTE
Group Topic: Other   Group Date: 10/27/2023  Start Time: 1110  End Time: 1145  Facilitators: JENNIFER Gramajo   Department: [facility] RE[facility]AB[facility]IRTUAL    Number of Participants: 6   Group Focus: other Top 5  Treatment Modality: Other: Recreation Therapy  Interventions utilized were exploration, mental fitness, and reminiscence  Purpose: other: increase attention span, enhance self esteem,  increase stimulation, increase alertness, increase activity level, elevate mood, stimulate memory, increase socialization      Name: Madalyn Ricci YOB: 1940   MR: [id number]      Facilitator: Recreational Therapist  Level of Participation: active  Quality of Participation: appropriate/pleasant, attentive, cooperative, and engaged  Interactions with others: appropriate  Mood/Affect: appropriate  Triggers (if applicable): n/a  Cognition:  did not attend  Progress: None  Comments: pt problem is depressed mood.  Plan: continue with services

## 2023-10-27 NOTE — PROGRESS NOTES
Madalyn Ricci is a 83 y.o. female on day 10 of admission presenting with Mood disorder (CMS/HCC).      Patient is an 82yo female admitted for Mood Disorder.    Case discussed in morning team.    Vitals signs reviewed  Patient case was discussed in morning team.  She slept 8hs appetite is good, mood is good(clarified that nursing reported mild depression but she denies- says she is feeling well). Taking meds as ordered. No prns required. She is attending groups.  Interviewed in common area today. Social work is looking into discharge plan. [facility] was accepted and social work is attempting to find SNF placement. Iain is reviewingIn November she will get her social security check and will hopefully be able to pay for assisted living if accomodations are not found before then.    Past Medical History  She has a past medical history of Diabetes mellitus (CMS/HCC) and Hypertension.    Past Psychiatric History:   Previous therapy: no  Previous psychiatric treatment and medication trials: no  Previous psychiatric hospitalizations: no  Previous diagnoses: no  Previous suicide attempts: no  History of violence: no  Currently in treatment with N/A.  Depression screening was performed with standardized tool: Yes, Depression    Surgical History  She has a past surgical history that includes Lung lobectomy (10/13/2015); Hysterectomy (10/13/2015); Cholecystectomy (10/13/2015); Appendectomy (10/13/2015); Hemicolectomy (10/13/2015); Cataract extraction (10/13/2015); and CT guided imaging for needle placement (2/4/2015).     Social History  She reports that she has never smoked. She has never used smokeless tobacco. No history on file for alcohol use and drug use.     Allergies  Patient has no known allergies.    Review of Systems    Psychiatric ROS - Adult  Anxiety: Mild  Depression: negative  Delirium: negative  Psychosis: negative  Margret: negative  Safety Issues: none  Psychiatric ROS Comment: The patient states that she is  "not suicidal, she did  and denies AVH. The patient states that she is not depressed and the only concern now is a caring and safe environment for her to live on.    Physical Exam      Mental Status Exam  General: NAD  Appearance: Hospital attire  Attitude: Accepting  Behavior: cooperative  Motor Activity: using walker  Speech: normal rate and volume  Mood: improving  Affect: more open  Thought Process: more organized  Thought Content: no delusions elicited, denies SI/HI  Thought Perception: denies AH/VH/paranoia  Cognition: fair  Insight: fair  Judgement: fair    Psychiatric Risk Assessment  Violence Risk Assessment: none  Acute Risk of Harm to Others is Considered: low   Suicide Risk Assessment: age > 65 yrs old, , chronic medical illness, history of trauma or abuse, and living alone or lack of social support  Protective Factors against Suicide: fear of suicide and [employer] affiliation/spirituality  Acute Risk of Harm to Self is Considered: low    Last Recorded Vitals  Blood pressure (!) 128/93, pulse 84, temperature 36.1 °C (97 °F), temperature source Temporal, resp. rate 17, height 1.626 m (5' 4.02\"), weight 127 kg (278 lb 15.9 oz), SpO2 99 %.    Relevant Results      Current Facility-Administered Medications:     acetaminophen (Tylenol) tablet 650 mg, 650 mg, oral, q4h PRN, Navin Brooke DO, 650 mg at 10/21/23 0910    alum-mag hydroxide-simeth (Mylanta) 200-200-20 mg/5 mL oral suspension 30 mL, 30 mL, oral, q6h PRN, Navin Brooke DO    amLODIPine (Norvasc) tablet 10 mg, 10 mg, oral, Daily, Osiel Forman MD, 10 mg at 10/27/23 0821    atorvastatin (Lipitor) tablet 40 mg, 40 mg, oral, Nightly, Osiel Forman MD, 40 mg at 10/26/23 2038    carvedilol (Coreg) tablet 25 mg, 25 mg, oral, BID with meals, Osiel Forman MD, 25 mg at 10/27/23 0821    dextrose 10 % in water (D10W) infusion, 0.3 g/kg/hr, intravenous, Once PRN, Osiel Forman MD    dextrose 50 % injection 25 g, 25 g, " intravenous, q15 min PRN, Osiel Forman MD    diphenhydrAMINE (BENADryl) capsule 50 mg, 50 mg, oral, q6h PRN **OR** diphenhydrAMINE (BENADryl) injection 50 mg, 50 mg, intramuscular, Once PRN, Navin Brooke DO    glucagon (Glucagen) injection 1 mg, 1 mg, intramuscular, q15 min PRN, Osiel Forman MD    icosapent ethyL (Vascepa) capsule 1 g, 1 g, oral, BID with meals, Osiel Forman MD, 1 g at 10/27/23 0821    insulin glargine (Lantus) injection 40 Units, 40 Units, subcutaneous, Nightly, Osiel Forman MD, 40 Units at 10/26/23 2038    latanoprost (Xalatan) 0.005 % ophthalmic solution 1 drop, 1 drop, Both Eyes, Nightly, Osiel Forman MD, 1 drop at 10/26/23 2038    losartan (Cozaar) tablet 100 mg, 100 mg, oral, Daily, Osiel Forman MD, 100 mg at 10/27/23 0821    magnesium hydroxide (Milk of Magnesia) 400 mg/5 mL suspension 30 mL, 30 mL, oral, Daily PRN, Navin Brooke DO    magnesium oxide (Mag-Ox) tablet 400 mg, 400 mg, oral, Daily, Osiel Forman MD, 400 mg at 10/27/23 0821    OLANZapine (ZyPREXA) tablet 5 mg, 5 mg, oral, q6h PRN **OR** OLANZapine (ZyPREXA) injection 5 mg, 5 mg, intramuscular, q6h PRN, Navin Brooke DO    pantoprazole (ProtoNix) EC tablet 40 mg, 40 mg, oral, Daily before breakfast, Osiel Forman MD, 40 mg at 10/27/23 0614    traZODone (Desyrel) tablet 25 mg, 25 mg, oral, Nightly PRN, Navin Brooke DO    venlafaxine XR (Effexor-XR) 24 hr capsule 150 mg, 150 mg, oral, Daily, Navin Brooke DO, 150 mg at 10/27/23 0821      Assessment/Plan   Principal Problem:    Mood disorder (CMS/[facility])  Active Problems:    Hyperlipidemia    GERD (gastroesophageal reflux disease)    Type 2 diabetes mellitus without complication, with long-term current use of insulin (CMS/[facility])    Primary hypertension    Continue meds as ordered.   Continue daily rounds.   Continue to encourage scheduled medication.  Encourage participation in group therapy.  Appreciate  social work arranging placement in assisted living   Provider did peer review with [facility] which was denied.         Medication Consent  Medication Consent: risks, benefits, side effects reviewed for all ordered medications    I spent 30  minutes in the professional and overall care of this patient.        Madalyn Ricci is a 83 y.o. female on day 10 of admission presenting with Mood disorder (CMS/[facility]).Madalyn Ricci is a 83 y.o. female on day 10 of admission presenting with Mood disorder (CMS/[facility]).Madalyn Ricci is a 83 y.o. female on day 10 of admission presenting with Mood disorder (CMS/[facility]).      Patient is an 84yo female admitted for Mood Disorder.    Case discussed in morning team.    Vitals signs reviewed  Patient case was discussed in morning team.  She slept 8hs appetite is good, mood is good. Taking meds as ordered. No prns required. She is attending groups.  Interviewed in common area today. Social work is looking into discharge plan. [facility] was accepted and social work is attempting to find SNF placement. Iain is reviewingIn November she will get her social security check and will hopefully be able to pay for assisted living if accomodations are not found before then.    Past Medical History  She has a past medical history of Diabetes mellitus (CMS/[facility]) and Hypertension.    Past Psychiatric History:   Previous therapy: no  Previous psychiatric treatment and medication trials: no  Previous psychiatric hospitalizations: no  Previous diagnoses: no  Previous suicide attempts: no  History of violence: no  Currently in treatment with N/A.  Depression screening was performed with standardized tool: Yes, Depression    Surgical History  She has a past surgical history that includes Lung lobectomy (10/13/2015); Hysterectomy (10/13/2015); Cholecystectomy (10/13/2015); Appendectomy (10/13/2015); Hemicolectomy (10/13/2015); Cataract extraction (10/13/2015); and CT guided imaging for needle placement  "(2/4/2015).     Social History  She reports that she has never smoked. She has never used smokeless tobacco. No history on file for alcohol use and drug use.     Allergies  Patient has no known allergies.    Review of Systems    Psychiatric ROS - Adult  Anxiety: Mild  Depression: negative  Delirium: negative  Psychosis: negative  Margret: negative  Safety Issues: none  Psychiatric ROS Comment: The patient states that she is not suicidal, she did  and denies AVH. The patient states that she is not depressed and the only concern now is a caring and safe environment for her to live on.    Physical Exam      Mental Status Exam  General: NAD  Appearance: Hospital attire  Attitude: Accepting  Behavior: cooperative  Motor Activity: using walker  Speech: normal rate and volume  Mood: improving  Affect: more open  Thought Process: more organized  Thought Content: no delusions elicited, denies SI/HI  Thought Perception: denies AH/VH/paranoia  Cognition: fair  Insight: fair  Judgement: fair    Psychiatric Risk Assessment  Violence Risk Assessment: none  Acute Risk of Harm to Others is Considered: low   Suicide Risk Assessment: age > 65 yrs old, , chronic medical illness, history of trauma or abuse, and living alone or lack of social support  Protective Factors against Suicide: fear of suicide and [employer] affiliation/spirituality  Acute Risk of Harm to Self is Considered: low    Last Recorded Vitals  Blood pressure (!) 128/93, pulse 84, temperature 36.1 °C (97 °F), temperature source Temporal, resp. rate 17, height 1.626 m (5' 4.02\"), weight 127 kg (278 lb 15.9 oz), SpO2 99 %.    Relevant Results      Current Facility-Administered Medications:     acetaminophen (Tylenol) tablet 650 mg, 650 mg, oral, q4h PRN, Navin Brooke DO, 650 mg at 10/21/23 0910    alum-mag hydroxide-simeth (Mylanta) 200-200-20 mg/5 mL oral suspension 30 mL, 30 mL, oral, q6h PRN, Navin Brooke DO    amLODIPine (Norvasc) tablet 10 mg, 10 " mg, oral, Daily, Osiel Forman MD, 10 mg at 10/27/23 0821    atorvastatin (Lipitor) tablet 40 mg, 40 mg, oral, Nightly, Osiel Forman MD, 40 mg at 10/26/23 2038    carvedilol (Coreg) tablet 25 mg, 25 mg, oral, BID with meals, Osiel Forman MD, 25 mg at 10/27/23 0821    dextrose 10 % in water (D10W) infusion, 0.3 g/kg/hr, intravenous, Once PRN, Osiel Forman MD    dextrose 50 % injection 25 g, 25 g, intravenous, q15 min PRN, Osiel Forman MD    diphenhydrAMINE (BENADryl) capsule 50 mg, 50 mg, oral, q6h PRN **OR** diphenhydrAMINE (BENADryl) injection 50 mg, 50 mg, intramuscular, Once PRN, Navin Brooke DO    glucagon (Glucagen) injection 1 mg, 1 mg, intramuscular, q15 min PRN, Osiel Forman MD    icosapent ethyL (Vascepa) capsule 1 g, 1 g, oral, BID with meals, Osiel Forman MD, 1 g at 10/27/23 0821    insulin glargine (Lantus) injection 40 Units, 40 Units, subcutaneous, Nightly, Osiel Forman MD, 40 Units at 10/26/23 2038    latanoprost (Xalatan) 0.005 % ophthalmic solution 1 drop, 1 drop, Both Eyes, Nightly, Osiel Forman MD, 1 drop at 10/26/23 2038    losartan (Cozaar) tablet 100 mg, 100 mg, oral, Daily, Osiel Forman MD, 100 mg at 10/27/23 0821    magnesium hydroxide (Milk of Magnesia) 400 mg/5 mL suspension 30 mL, 30 mL, oral, Daily PRN, Navin Brooke DO    magnesium oxide (Mag-Ox) tablet 400 mg, 400 mg, oral, Daily, Osiel Forman MD, 400 mg at 10/27/23 0821    OLANZapine (ZyPREXA) tablet 5 mg, 5 mg, oral, q6h PRN **OR** OLANZapine (ZyPREXA) injection 5 mg, 5 mg, intramuscular, q6h PRN, Navin Brooke DO    pantoprazole (ProtoNix) EC tablet 40 mg, 40 mg, oral, Daily before breakfast, Osiel Forman MD, 40 mg at 10/27/23 0614    traZODone (Desyrel) tablet 25 mg, 25 mg, oral, Nightly PRN, Navin Brooke DO    venlafaxine XR (Effexor-XR) 24 hr capsule 150 mg, 150 mg, oral, Daily, Navin Brooke DO, 150 mg at 10/27/23 0821       Assessment/Plan   Principal Problem:    Mood disorder (CMS/HCC)  Active Problems:    Hyperlipidemia    GERD (gastroesophageal reflux disease)    Type 2 diabetes mellitus without complication, with long-term current use of insulin (CMS/[facility])    Primary hypertension    Continue meds as ordered.   Continue daily rounds.   Continue to encourage scheduled medication.  Encourage participation in group therapy.  Appreciate social work arranging placement in assisted living   Provider is attempting to set up peer review with [facility] for placement.         Medication Consent  Medication Consent: risks, benefits, side effects reviewed for all ordered medications    I spent 30  minutes in the professional and overall care of this patient.        Madalyn Ricci is a 83 y.o. female on day 10 of admission presenting with Mood disorder (CMS/[facility]).Madalyn Ricci is a 83 y.o. female on day 10 of admission presenting with Mood disorder (CMS/[facility]).Madalyn Ricci is a 83 y.o. female on day 10 of admission presenting with Mood disorder (CMS/[facility]).

## 2023-10-27 NOTE — NURSING NOTE
WISAM NOTE     Problem:  depression     Behavior:  Pt came out of her room for snack, socially engaged, pleasant and cooperative, addressed her needs, denied SI  Group Participation: n/a  Appetite/Meals: hs snack provided       Interventions:  1:1 intervention provided, scheduled medication administered, medication related education reviewed    Response:  Pt is compliant with her medication, verbalized understanding for taking medication     Plan:  Continue monitoring, provide positive reinforcement

## 2023-10-28 LAB
GLUCOSE BLD MANUAL STRIP-MCNC: 149 MG/DL (ref 74–99)
GLUCOSE BLD MANUAL STRIP-MCNC: 182 MG/DL (ref 74–99)
GLUCOSE BLD MANUAL STRIP-MCNC: 196 MG/DL (ref 74–99)
GLUCOSE BLD MANUAL STRIP-MCNC: 49 MG/DL (ref 74–99)
GLUCOSE BLD MANUAL STRIP-MCNC: 82 MG/DL (ref 74–99)

## 2023-10-28 PROCEDURE — 82947 ASSAY GLUCOSE BLOOD QUANT: CPT

## 2023-10-28 PROCEDURE — 97116 GAIT TRAINING THERAPY: CPT | Mod: GP

## 2023-10-28 PROCEDURE — 97110 THERAPEUTIC EXERCISES: CPT | Mod: GP

## 2023-10-28 PROCEDURE — 2500000004 HC RX 250 GENERAL PHARMACY W/ HCPCS (ALT 636 FOR OP/ED): Performed by: INTERNAL MEDICINE

## 2023-10-28 PROCEDURE — 2500000001 HC RX 250 WO HCPCS SELF ADMINISTERED DRUGS (ALT 637 FOR MEDICARE OP): Performed by: INTERNAL MEDICINE

## 2023-10-28 PROCEDURE — 2500000001 HC RX 250 WO HCPCS SELF ADMINISTERED DRUGS (ALT 637 FOR MEDICARE OP): Performed by: PSYCHIATRY & NEUROLOGY

## 2023-10-28 PROCEDURE — 99232 SBSQ HOSP IP/OBS MODERATE 35: CPT | Performed by: STUDENT IN AN ORGANIZED HEALTH CARE EDUCATION/TRAINING PROGRAM

## 2023-10-28 PROCEDURE — 1140000001 HC PRIVATE PSYCH ROOM DAILY

## 2023-10-28 RX ADMIN — AMLODIPINE BESYLATE 10 MG: 10 TABLET ORAL at 08:13

## 2023-10-28 RX ADMIN — LATANOPROST 1 DROP: 50 SOLUTION OPHTHALMIC at 20:51

## 2023-10-28 RX ADMIN — ICOSAPENT ETHYL 1 G: 1 CAPSULE ORAL at 08:13

## 2023-10-28 RX ADMIN — ATORVASTATIN CALCIUM 40 MG: 40 TABLET, FILM COATED ORAL at 20:51

## 2023-10-28 RX ADMIN — PANTOPRAZOLE SODIUM 40 MG: 40 TABLET, DELAYED RELEASE ORAL at 08:13

## 2023-10-28 RX ADMIN — ICOSAPENT ETHYL 1 G: 1 CAPSULE ORAL at 17:37

## 2023-10-28 RX ADMIN — CARVEDILOL 25 MG: 12.5 TABLET, FILM COATED ORAL at 17:37

## 2023-10-28 RX ADMIN — LOSARTAN POTASSIUM 100 MG: 50 TABLET, FILM COATED ORAL at 08:13

## 2023-10-28 RX ADMIN — Medication 400 MG: at 08:13

## 2023-10-28 RX ADMIN — VENLAFAXINE HYDROCHLORIDE 150 MG: 150 CAPSULE, EXTENDED RELEASE ORAL at 08:13

## 2023-10-28 RX ADMIN — CARVEDILOL 25 MG: 12.5 TABLET, FILM COATED ORAL at 08:13

## 2023-10-28 RX ADMIN — INSULIN GLARGINE 40 UNITS: 100 INJECTION, SOLUTION SUBCUTANEOUS at 20:51

## 2023-10-28 ASSESSMENT — COGNITIVE AND FUNCTIONAL STATUS - GENERAL
TURNING FROM BACK TO SIDE WHILE IN FLAT BAD: A LITTLE
WALKING IN HOSPITAL ROOM: A LITTLE
MOVING TO AND FROM BED TO CHAIR: A LITTLE
STANDING UP FROM CHAIR USING ARMS: A LITTLE
MOVING FROM LYING ON BACK TO SITTING ON SIDE OF FLAT BED WITH BEDRAILS: A LITTLE
CLIMB 3 TO 5 STEPS WITH RAILING: A LITTLE
MOBILITY SCORE: 18

## 2023-10-28 ASSESSMENT — PAIN SCALES - GENERAL
PAINLEVEL_OUTOF10: 0 - NO PAIN

## 2023-10-28 ASSESSMENT — PAIN - FUNCTIONAL ASSESSMENT
PAIN_FUNCTIONAL_ASSESSMENT: 0-10

## 2023-10-28 ASSESSMENT — COLUMBIA-SUICIDE SEVERITY RATING SCALE - C-SSRS
6. HAVE YOU EVER DONE ANYTHING, STARTED TO DO ANYTHING, OR PREPARED TO DO ANYTHING TO END YOUR LIFE?: NO
6. HAVE YOU EVER DONE ANYTHING, STARTED TO DO ANYTHING, OR PREPARED TO DO ANYTHING TO END YOUR LIFE?: NO
1. SINCE LAST CONTACT, HAVE YOU WISHED YOU WERE DEAD OR WISHED YOU COULD GO TO SLEEP AND NOT WAKE UP?: NO
2. HAVE YOU ACTUALLY HAD ANY THOUGHTS OF KILLING YOURSELF?: NO
1. SINCE LAST CONTACT, HAVE YOU WISHED YOU WERE DEAD OR WISHED YOU COULD GO TO SLEEP AND NOT WAKE UP?: NO
2. HAVE YOU ACTUALLY HAD ANY THOUGHTS OF KILLING YOURSELF?: NO

## 2023-10-28 NOTE — PROGRESS NOTES
Physical Therapy       10/28/23 [id number]   PT  Visit   PT Received On 10/28/23   General   Reason for Referral impaired mobility   Referred By Dr. Brooke   Past Medical History Relevant to Rehab HTN, hyperlipidemia, GERD, DM, neuropathy   Prior to Session Communication Bedside nurse   Pain Assessment   Pain Assessment 0-10   Pain Score 0 - No pain   Cognition   Overall Cognitive Status WFL   Orientation Level Oriented X4   Therapeutic Exercise   Therapeutic Exercise Performed Yes   Therapeutic Exercise Activity 1 standing heel raises 1 x 15   Therapeutic Exercise Activity 2 standing hip abd 1 x 15   Therapeutic Exercise Activity 3 standing hip flex 1 x 15   Therapeutic Exercise Activity 4 standing knee flex/hamstring curls 1 x 15   Therapeutic Exercise Activity 5 standing hip ext 1 x 15   Therapeutic Exercise Activity 6 mini squats 1 x 15   Ambulation/Gait Training 1   Surface 1 Level tile   Device 1 Rolling walker   Assistance 1 Close supervision   Comments/Distance (ft) 1 100' x 2, WBOS, decreased step length, includes turns.   Transfer 1   Technique 1 Sit to stand;Stand to sit   Trials/Comments 1 cues for hand placement with transfers as patient tends to pull up on walker to stand.   [facility]                                        17   Activity Tolerance   Endurance Tolerates less than 10 min exercise, no significant change in vital signs   PT Assessment   Evaluation/Treatment Tolerance Patient tolerated treatment well   End of Session Patient Position Up in chair   PT Plan   Inpatient/Swing Bed or Outpatient Inpatient   PT Plan   Treatment/Interventions Transfer training;Gait training;Strengthening;Therapeutic exercise

## 2023-10-28 NOTE — GROUP NOTE
Group Topic: Dialectical Behavioral Therapy - Emotional Regulation   Group Date: 10/28/2023  Start Time: 1010  End Time: 1100  Facilitators: JENNIFER Martin   Department: [facility] REHAB[facility]IRT[facility]    Number of Participants: 4   Group Focus: coping skills and feeling awareness/expression  Treatment Modality: Other: Recreation Therapy  Interventions utilized were exploration, mental fitness, and reminiscence  Purpose: coping skills, feelings, and insight or knowledge    Name: Madalyn Ricci YOB: 1940   MR: [id number]      Facilitator: Recreational Therapist  Level of Participation: moderate  Quality of Participation: appropriate/pleasant and cooperative  Interactions with others: appropriate  Mood/Affect: appropriate and brightens with interaction  Triggers (if applicable): n/a  Cognition: coherent/clear  Progress: Moderate  Comments: pt problem is depressed mood  Plan: continue with services

## 2023-10-28 NOTE — GROUP NOTE
Group Topic: Other   Group Date: 10/28/2023  Start Time: 1100  End Time: 1130  Facilitators: JENNIFER Martin   Department: [facility] REHABTH VIRTUAL    Number of Participants: 4   Group Focus: other Stress management  Treatment Modality: Other: Recreation Therapy  Interventions utilized were patient education  Purpose: coping skills and insight or knowledge    Name: Madalyn Ricci YOB: 1940   MR: [id number]      Facilitator: Recreational Therapist  Level of Participation: active  Quality of Participation: appropriate/pleasant and cooperative  Interactions with others: appropriate  Mood/Affect: appropriate and positive  Triggers (if applicable): n/a  Cognition: coherent/clear  Progress: Moderate  Comments: pt problem is depressed mood  Plan: continue with services

## 2023-10-28 NOTE — NURSING NOTE
BIRP NOTE     Problem:  SI, depression      Behavior:  Patient is pleasant and cooperative for assessment and medication administration. Makes needs known. Patient interacts appropriately with staff and peers. Patient denies SI and feeling depressed today.   Group Participation: Attends both groups  Appetite/Meals: 100-100-100       Interventions:  Medications administered per orders.     Response:  Medication compliant.      Plan:  Maintain q 15 minute rounds for patient safety. Will continue to monitor behaviors. Continue current plan of care.

## 2023-10-28 NOTE — CARE PLAN
Problem: Fall/Injury  Goal: Use assistive devices by end of the shift  Outcome: Met   The patient's goals for the shift include go to group    The clinical goals for the shift include no falls    Over the shift, the patient did not make progress toward the following goals.

## 2023-10-28 NOTE — NURSING NOTE
Pt repeat glucose to ensure hypoglycemia treated effectively was 82. Pt remains asymptomatic. Pt wrist band cutting off her circulation on the left wrist. Band removed and a new one put on. Pt reports a red area of concern on her left hand that wasn't there prior to admission. Pt left hand and wrist are +2 swelling with good blood return. Will notify MD via text per his preference.

## 2023-10-28 NOTE — NURSING NOTE
Pt blood sugar 49. Pt treated for hypoglycemia with 2 orange juice and peanut butter cookies from the dining room. Pt asymptomatic. No signs of distress noted. Pt A/OX4

## 2023-10-28 NOTE — NURSING NOTE
WISAM NOTE     Problem:  Depression     Behavior:  Pt up on the milieu but no peer interaction witnessed. Pt asked writer if she'd get HS meds or did she get forgotten. Pt denies SI/HI/A/V/H.     Group Participation: No HS group  Appetite/Meals: pt had HS snack       Interventions:  Medicated per orders. Checked blood sugar.    Response:  Cooperative.      Plan:  Continue to monitor for safety.

## 2023-10-28 NOTE — GROUP NOTE
Group Topic: Stress Reduction/Relaxation   Group Date: 10/28/2023  Start Time: 1510  End Time: 1555  Facilitators: JENNIFER Martin   Department: [facility] REHABTH VIRTUAL    Number of Participants: 5   Group Focus: mindfulness and relaxation  Treatment Modality: Other: Recreation Therapy  Interventions utilized were other Guided Imagery   Purpose: self-care and other: relaxation     Name: Madalyn Ricci YOB: 1940   MR: [id number]      Facilitator: Recreational Therapist  Level of Participation: active  Quality of Participation: appropriate/pleasant, cooperative, and engaged  Interactions with others: appropriate  Mood/Affect: appropriate  Triggers (if applicable): n/a  Cognition: coherent/clear  Progress: Moderate  Comments: pt problem is depressed mood.   Plan: continue with services

## 2023-10-28 NOTE — PROGRESS NOTES
Madalyn Ricci is a 83 y.o. female on day 11 of admission presenting with Mood disorder (CMS/HCC).      Patient is an 84yo female admitted for Mood Disorder.    Case discussed with nursing staff.      She slept well, appetite is good, mood is fair, stating that she is overall better but still upset about social situation that precipitated admission and needing housing. Taking meds as ordered. No prns required. She is attending groups.  Interviewed in common area today. Social work is looking into discharge plan. [facility] was accepted and social work is attempting to find SNF placement. In November she will get her social security check and will hopefully be able to pay for assisted living if accomodations are not found before then.    Past Medical History  She has a past medical history of Diabetes mellitus (CMS/[facility]) and Hypertension.    Past Psychiatric History:   Previous therapy: no  Previous psychiatric treatment and medication trials: no  Previous psychiatric hospitalizations: no  Previous diagnoses: no  Previous suicide attempts: no  History of violence: no  Currently in treatment with N/A.  Depression screening was performed with standardized tool: Yes, Depression    Surgical History  She has a past surgical history that includes Lung lobectomy (10/13/2015); Hysterectomy (10/13/2015); Cholecystectomy (10/13/2015); Appendectomy (10/13/2015); Hemicolectomy (10/13/2015); Cataract extraction (10/13/2015); and CT guided imaging for needle placement (2/4/2015).     Social History  She reports that she has never smoked. She has never used smokeless tobacco. No history on file for alcohol use and drug use.     Allergies  Patient has no known allergies.    Review of Systems    Psychiatric ROS - Adult  Anxiety: Mild  Depression: negative  Delirium: negative  Psychosis: negative  Margret: negative  Safety Issues: none  Psychiatric ROS Comment: The patient states that she is not suicidal, she did  and denies AVH. The  "patient states that she is not depressed and the only concern now is a caring and safe environment for her to live on.    Physical Exam      Mental Status Exam  General: NAD  Appearance: Hospital attire  Attitude: Calm  Behavior: cooperative  Motor Activity: using walker  Speech: normal rate and volume  Mood: \"a little better\"  Affect: improving range and reactivity  Thought Process: organized  Thought Content: no delusions elicited, denies SI/HI  Thought Perception: denies AH/VH/paranoia  Cognition: fair  Insight: fair  Judgement: fair    Psychiatric Risk Assessment  Violence Risk Assessment: none  Acute Risk of Harm to Others is Considered: low   Suicide Risk Assessment: age > 65 yrs old, , chronic medical illness, history of trauma or abuse, and living alone or lack of social support  Protective Factors against Suicide: fear of suicide and Voodoo affiliation/spirituality  Acute Risk of Harm to Self is Considered: low    Last Recorded Vitals  Blood pressure (!) 128/93, pulse 84, temperature 36.1 °C (97 °F), temperature source Temporal, resp. rate 17, height 1.626 m (5' 4.02\"), weight 127 kg (278 lb 15.9 oz), SpO2 99 %.    Relevant Results      Current Facility-Administered Medications:     acetaminophen (Tylenol) tablet 650 mg, 650 mg, oral, q4h PRN, Navin Brooke DO, 650 mg at 10/21/23 0910    alum-mag hydroxide-simeth (Mylanta) 200-200-20 mg/5 mL oral suspension 30 mL, 30 mL, oral, q6h PRN, Navin Brooke DO    amLODIPine (Norvasc) tablet 10 mg, 10 mg, oral, Daily, Osiel Forman MD, 10 mg at 10/28/23 0813    atorvastatin (Lipitor) tablet 40 mg, 40 mg, oral, Nightly, Osiel Forman MD, 40 mg at 10/27/23 2049    carvedilol (Coreg) tablet 25 mg, 25 mg, oral, BID with meals, Osiel Forman MD, 25 mg at 10/28/23 0813    dextrose 10 % in water (D10W) infusion, 0.3 g/kg/hr, intravenous, Once PRN, Osiel Forman MD    dextrose 50 % injection 25 g, 25 g, intravenous, q15 min PRN, " Osiel Forman MD    diphenhydrAMINE (BENADryl) capsule 50 mg, 50 mg, oral, q6h PRN **OR** diphenhydrAMINE (BENADryl) injection 50 mg, 50 mg, intramuscular, Once PRN, Navin Brooke DO    glucagon (Glucagen) injection 1 mg, 1 mg, intramuscular, q15 min PRN, Osiel Forman MD    icosapent ethyL (Vascepa) capsule 1 g, 1 g, oral, BID with meals, Osiel Forman MD, 1 g at 10/28/23 0813    insulin glargine (Lantus) injection 40 Units, 40 Units, subcutaneous, Nightly, Osiel Forman MD, 40 Units at 10/27/23 2050    latanoprost (Xalatan) 0.005 % ophthalmic solution 1 drop, 1 drop, Both Eyes, Nightly, Osiel Forman MD, 1 drop at 10/27/23 2050    losartan (Cozaar) tablet 100 mg, 100 mg, oral, Daily, Osiel Forman MD, 100 mg at 10/28/23 0813    magnesium hydroxide (Milk of Magnesia) 400 mg/5 mL suspension 30 mL, 30 mL, oral, Daily PRN, Navin Brooke DO    magnesium oxide (Mag-Ox) tablet 400 mg, 400 mg, oral, Daily, Osiel Forman MD, 400 mg at 10/28/23 0813    OLANZapine (ZyPREXA) tablet 5 mg, 5 mg, oral, q6h PRN **OR** OLANZapine (ZyPREXA) injection 5 mg, 5 mg, intramuscular, q6h PRN, Navin Brooke DO    pantoprazole (ProtoNix) EC tablet 40 mg, 40 mg, oral, Daily before breakfast, Osiel Forman MD, 40 mg at 10/28/23 0813    traZODone (Desyrel) tablet 25 mg, 25 mg, oral, Nightly PRN, Navin Brooke DO    venlafaxine XR (Effexor-XR) 24 hr capsule 150 mg, 150 mg, oral, Daily, Navin Brooke DO, 150 mg at 10/28/23 0813      Assessment/Plan   Principal Problem:    Mood disorder (CMS/[facility])  Active Problems:    Hyperlipidemia    GERD (gastroesophageal reflux disease)    Type 2 diabetes mellitus without complication, with long-term current use of insulin (CMS/[facility])    Primary hypertension    Continue meds as ordered.   Continue daily rounds.   Continue to encourage scheduled medication.  Encourage participation in group therapy.  Appreciate social work arranging  placement in assisted living   Provider did peer review with [facility] which was denied.         Medication Consent  Medication Consent: risks, benefits, side effects reviewed for all ordered medications    I spent 30  minutes in the professional and overall care of this patient.        Troy Mayen MD

## 2023-10-29 LAB
GLUCOSE BLD MANUAL STRIP-MCNC: 129 MG/DL (ref 74–99)
GLUCOSE BLD MANUAL STRIP-MCNC: 130 MG/DL (ref 74–99)
GLUCOSE BLD MANUAL STRIP-MCNC: 151 MG/DL (ref 74–99)
GLUCOSE BLD MANUAL STRIP-MCNC: 170 MG/DL (ref 74–99)

## 2023-10-29 PROCEDURE — 99232 SBSQ HOSP IP/OBS MODERATE 35: CPT | Performed by: STUDENT IN AN ORGANIZED HEALTH CARE EDUCATION/TRAINING PROGRAM

## 2023-10-29 PROCEDURE — 1140000001 HC PRIVATE PSYCH ROOM DAILY

## 2023-10-29 PROCEDURE — 2500000004 HC RX 250 GENERAL PHARMACY W/ HCPCS (ALT 636 FOR OP/ED): Performed by: INTERNAL MEDICINE

## 2023-10-29 PROCEDURE — 82947 ASSAY GLUCOSE BLOOD QUANT: CPT

## 2023-10-29 PROCEDURE — 2500000001 HC RX 250 WO HCPCS SELF ADMINISTERED DRUGS (ALT 637 FOR MEDICARE OP): Performed by: PSYCHIATRY & NEUROLOGY

## 2023-10-29 PROCEDURE — 2500000001 HC RX 250 WO HCPCS SELF ADMINISTERED DRUGS (ALT 637 FOR MEDICARE OP): Performed by: INTERNAL MEDICINE

## 2023-10-29 RX ADMIN — LATANOPROST 1 DROP: 50 SOLUTION OPHTHALMIC at 20:42

## 2023-10-29 RX ADMIN — ACETAMINOPHEN 650 MG: 325 TABLET ORAL at 07:55

## 2023-10-29 RX ADMIN — LOSARTAN POTASSIUM 100 MG: 50 TABLET, FILM COATED ORAL at 08:25

## 2023-10-29 RX ADMIN — INSULIN GLARGINE 40 UNITS: 100 INJECTION, SOLUTION SUBCUTANEOUS at 20:42

## 2023-10-29 RX ADMIN — ICOSAPENT ETHYL 1 G: 1 CAPSULE ORAL at 16:57

## 2023-10-29 RX ADMIN — VENLAFAXINE HYDROCHLORIDE 150 MG: 150 CAPSULE, EXTENDED RELEASE ORAL at 08:25

## 2023-10-29 RX ADMIN — CARVEDILOL 25 MG: 12.5 TABLET, FILM COATED ORAL at 07:54

## 2023-10-29 RX ADMIN — ATORVASTATIN CALCIUM 40 MG: 40 TABLET, FILM COATED ORAL at 20:42

## 2023-10-29 RX ADMIN — PANTOPRAZOLE SODIUM 40 MG: 40 TABLET, DELAYED RELEASE ORAL at 07:56

## 2023-10-29 RX ADMIN — ICOSAPENT ETHYL 1 G: 1 CAPSULE ORAL at 07:55

## 2023-10-29 RX ADMIN — Medication 400 MG: at 08:25

## 2023-10-29 RX ADMIN — AMLODIPINE BESYLATE 10 MG: 10 TABLET ORAL at 08:25

## 2023-10-29 ASSESSMENT — PAIN SCALES - GENERAL
PAINLEVEL_OUTOF10: 0 - NO PAIN
PAINLEVEL_OUTOF10: 6

## 2023-10-29 ASSESSMENT — PAIN - FUNCTIONAL ASSESSMENT
PAIN_FUNCTIONAL_ASSESSMENT: 0-10

## 2023-10-29 ASSESSMENT — COLUMBIA-SUICIDE SEVERITY RATING SCALE - C-SSRS
1. SINCE LAST CONTACT, HAVE YOU WISHED YOU WERE DEAD OR WISHED YOU COULD GO TO SLEEP AND NOT WAKE UP?: NO
6. HAVE YOU EVER DONE ANYTHING, STARTED TO DO ANYTHING, OR PREPARED TO DO ANYTHING TO END YOUR LIFE?: NO
1. SINCE LAST CONTACT, HAVE YOU WISHED YOU WERE DEAD OR WISHED YOU COULD GO TO SLEEP AND NOT WAKE UP?: NO
2. HAVE YOU ACTUALLY HAD ANY THOUGHTS OF KILLING YOURSELF?: NO
6. HAVE YOU EVER DONE ANYTHING, STARTED TO DO ANYTHING, OR PREPARED TO DO ANYTHING TO END YOUR LIFE?: NO
2. HAVE YOU ACTUALLY HAD ANY THOUGHTS OF KILLING YOURSELF?: NO

## 2023-10-29 NOTE — PROGRESS NOTES
"Madalyn Ricci is a 83 y.o. female on day 12 of admission presenting with Mood disorder (CMS/HCC).      Patient is an 82yo female admitted for Mood Disorder.    Case discussed with nursing staff.      Denies any acute complaints, states her mood is \"fine,\" and waiting for housing. Taking meds as ordered. No prns required. She is attending groups.  Social work is looking into discharge plan. [facility] was accepted and social work is attempting to find SNF placement. In November she will get her social security check and will hopefully be able to pay for assisted living if accomodations are not found before then.    Past Medical History  She has a past medical history of Diabetes mellitus (CMS/[facility]) and Hypertension.    Past Psychiatric History:   Previous therapy: no  Previous psychiatric treatment and medication trials: no  Previous psychiatric hospitalizations: no  Previous diagnoses: no  Previous suicide attempts: no  History of violence: no  Currently in treatment with N/A.  Depression screening was performed with standardized tool: Yes, Depression    Surgical History  She has a past surgical history that includes Lung lobectomy (10/13/2015); Hysterectomy (10/13/2015); Cholecystectomy (10/13/2015); Appendectomy (10/13/2015); Hemicolectomy (10/13/2015); Cataract extraction (10/13/2015); and CT guided imaging for needle placement (2/4/2015).     Social History  She reports that she has never smoked. She has never used smokeless tobacco. No history on file for alcohol use and drug use.     Allergies  Patient has no known allergies.    Review of Systems    Psychiatric ROS - Adult  Anxiety: Mild  Depression: negative  Delirium: negative  Psychosis: negative  Margret: negative  Safety Issues: none  Psychiatric ROS Comment: The patient states that she is not suicidal, she did  and denies AVH. The patient states that she is not depressed and the only concern now is a caring and safe environment for her to live on.    Physical " "Exam      Mental Status Exam  General: NAD  Appearance: Hospital attire  Attitude: Calm  Behavior: cooperative  Motor Activity: using walker  Speech: normal rate and volume  Mood: \"fine\"  Affect: moderate range and reactivity  Thought Process: organized  Thought Content: no delusions elicited, denies SI/HI  Thought Perception: denies AH/VH/paranoia  Cognition: fair  Insight: fair  Judgement: fair    Psychiatric Risk Assessment  Violence Risk Assessment: none  Acute Risk of Harm to Others is Considered: low   Suicide Risk Assessment: age > 65 yrs old, , chronic medical illness, history of trauma or abuse, and living alone or lack of social support  Protective Factors against Suicide: fear of suicide and Mu-ism affiliation/spirituality  Acute Risk of Harm to Self is Considered: low    Last Recorded Vitals  Blood pressure 165/70, pulse 69, temperature 36.5 °C (97.7 °F), temperature source Oral, resp. rate 18, height 1.626 m (5' 4.02\"), weight 127 kg (278 lb 15.9 oz), SpO2 97 %.    Relevant Results      Current Facility-Administered Medications:     acetaminophen (Tylenol) tablet 650 mg, 650 mg, oral, q4h PRN, Navin Brooke DO, 650 mg at 10/29/23 0755    alum-mag hydroxide-simeth (Mylanta) 200-200-20 mg/5 mL oral suspension 30 mL, 30 mL, oral, q6h PRN, Navin Brooke DO    amLODIPine (Norvasc) tablet 10 mg, 10 mg, oral, Daily, Osiel Forman MD, 10 mg at 10/29/23 0825    atorvastatin (Lipitor) tablet 40 mg, 40 mg, oral, Nightly, Osiel Forman MD, 40 mg at 10/28/23 2051    carvedilol (Coreg) tablet 25 mg, 25 mg, oral, BID with meals, Osiel Forman MD, 25 mg at 10/29/23 0754    dextrose 10 % in water (D10W) infusion, 0.3 g/kg/hr, intravenous, Once PRN, Osiel Forman MD    dextrose 50 % injection 25 g, 25 g, intravenous, q15 min PRN, Osiel Forman MD    diphenhydrAMINE (BENADryl) capsule 50 mg, 50 mg, oral, q6h PRN **OR** diphenhydrAMINE (BENADryl) injection 50 mg, 50 mg, " intramuscular, Once PRN, Navin Brooke DO    glucagon (Glucagen) injection 1 mg, 1 mg, intramuscular, q15 min PRN, Osiel Forman MD    icosapent ethyL (Vascepa) capsule 1 g, 1 g, oral, BID with meals, Osiel Forman MD, 1 g at 10/29/23 0755    insulin glargine (Lantus) injection 40 Units, 40 Units, subcutaneous, Nightly, Osiel Forman MD, 40 Units at 10/28/23 2051    latanoprost (Xalatan) 0.005 % ophthalmic solution 1 drop, 1 drop, Both Eyes, Nightly, Osiel Forman MD, 1 drop at 10/28/23 2051    losartan (Cozaar) tablet 100 mg, 100 mg, oral, Daily, Osiel Forman MD, 100 mg at 10/29/23 0825    magnesium hydroxide (Milk of Magnesia) 400 mg/5 mL suspension 30 mL, 30 mL, oral, Daily PRN, Navin Brooke DO    magnesium oxide (Mag-Ox) tablet 400 mg, 400 mg, oral, Daily, Osiel Forman MD, 400 mg at 10/29/23 0825    OLANZapine (ZyPREXA) tablet 5 mg, 5 mg, oral, q6h PRN **OR** OLANZapine (ZyPREXA) injection 5 mg, 5 mg, intramuscular, q6h PRN, Navin Brooke DO    pantoprazole (ProtoNix) EC tablet 40 mg, 40 mg, oral, Daily before breakfast, Osiel Forman MD, 40 mg at 10/29/23 0756    traZODone (Desyrel) tablet 25 mg, 25 mg, oral, Nightly PRN, Navin Brooke DO    venlafaxine XR (Effexor-XR) 24 hr capsule 150 mg, 150 mg, oral, Daily, Navin Brooke DO, 150 mg at 10/29/23 0825      Assessment/Plan   Principal Problem:    Mood disorder (CMS/[facility])  Active Problems:    Hyperlipidemia    GERD (gastroesophageal reflux disease)    Type 2 diabetes mellitus without complication, with long-term current use of insulin (CMS/HCC)    Primary hypertension    Continue meds as ordered.   Continue daily rounds.   Continue to encourage scheduled medication.  Encourage participation in group therapy.  Appreciate social work arranging placement in assisted living   Provider did peer review with [facility] which was denied.         Medication Consent  Medication Consent: risks, benefits, side  effects reviewed for all ordered medications    I spent 30  minutes in the professional and overall care of this patient.        Troy Mayen MD

## 2023-10-29 NOTE — GROUP NOTE
Group Topic: Leisure Skills   Group Date: 10/29/2023  Start Time: 1010  End Time: 1110  Facilitators: JENNIFER Martin   Department: [facility] REHABTH [facility]    Number of Participants: 4   Group Focus: leisure skills  Treatment Modality: Other: Recreation Therapy  Interventions utilized were leisure development  Purpose: other: Leisure development , creative thinkinking    Name: Madalyn Ricci YOB: 1940   MR: [id number]      Facilitator: Recreational Therapist  Level of Participation: minimal  Quality of Participation: passive and quiet  Interactions with others: appropriate  Mood/Affect: appropriate  Triggers (if applicable): n/a  Cognition: coherent/clear  Progress: Minimal  Comments: pt problem is depressed mood.   Plan: continue with services

## 2023-10-29 NOTE — NURSING NOTE
BIRP NOTE     Problem:  SI, depression      Behavior:  Patient is pleasant and cooperative for assessment and medication administration. Patient interacts appropriately with staff and peers. Patient continues to deny SI to this nurse.   Group Participation: Attends both groups  Appetite/Meals: 100-100-100       Interventions:  Medications administered per MAR.     Response:  Medication compliant.      Plan:  Maintain q 15 minute rounds for patient safety. Will continue to monitor behaviors and assess for SI. Continue current plan of care.      Patient:   Lavinia Rock            MRN: [id number]            FIN: [id number]              Age:   72 years     Sex:  MALE     :  50   Associated Diagnoses:   None   Author:   Jordy Patel     Subjective    denies any chest pain or shortness of breath   negative balance overnight -1.7 L  Creatinine slightly up to 2.1     Objective   Intake and Output   I & O between:  31-AUG-2019 11:08 TO 01-SEP-2019 11:08  Med Dosing Weight:  83.3  kg   22-AUG-2019  24 Hour Intake:   370.00  ( 4.44 mL/kg )  24 Hour Output:   1150.00           24 Hour Urine/Stool Output:   0.0  24 Hour Balance:   -780.00           24 Hour Urine Output:   1150.00  ( 0.58 mL/kg/hr )                   Urine Count:  1    Stool Count:  1         VS/Measurements   Vitals between:   31-AUG-2019 11:08:11   TO   01-SEP-2019 11:08:11                   LAST RESULT MINIMUM MAXIMUM  Temperature 36.6 36.4 37  Heart Rate 95 95 103  Respiratory Rate 18 16 18  SpO2                    100 99 100      Lines and Tubes:    LINES  Central Catheter Nontunneled Dual Lumen solo Right, Basilic   Charted: 62/96/91 19:45  Inserted: 19   Days Since Insertion: 8 days  Indication of Use: IV Drugs, Limited Peripheral Access   .     Physical Exam:   Awake alert weak less adynamic, sitting up in chair, not diaphoretic, with chest tube in place on oxygen high flow per nasal cannula, Barrow catheter in place,  clear urine in the bag       Results Review   General results   Interpretation:   Labs between:  31-AUG-2019 11:08 to 01-SEP-2019 11:08  CBC:                 WBC  HgB  Hct  Plt  MCV  RDW   01-SEP-2019 6.6  (L) 8.6  (L) 26.4  350  83.5  13.3   DIFF:                 Seg  Neutroph//ABS  Lymph//ABS  Mono//ABS  EOS/ABS  [id number] NOT APPLICABLE  63 // 4.2 17 // 1.1 11 // 0.7 7 // 0.5  BMP:                 Na  Cl  BUN  Glu   01-SEP-2019 137  101  (H) 25  (H) 101                              K  CO2  Cr  Ca                              4.1  27  (H) 2.10 8. 9   CMP:                 AST  ALT  AlkPhos  Bili  Albumin   01-SEP-2019 (H) 38  36  (H) 161  0.5  (L) 2.4   Other Chem:             Mg  Phos  Triglycerides  GGTP  DirectBili                           2.0           POC GLU:                 Latest Result  Latest Date  Minimum  Min Date  Maximum  Max Date                             (H) 119  01-SEP-2019 (H) 119  01-SEP-2019 (H) 162  31-AUG-2019  COAG:                 INR  PT  PTT  Ddimer  Fibrinogen    01-SEP-2019 1.8  (H) 18.0                                            Impression and Plan   Assessment and plan  Ischemic cardiomyopathy, status post LVAD  volume overload, improving  Acute kidney injury on possibly chronic kidney disease, nonoliguric, urine output good, BUN to creatinine elevated but stable  Hyponatremia, chronic, possibly still asymptomatic, related to volume status and ongoing diuresis,  Elevated LFTs  Metabolic alkalosis secondary to decreased effective arterial volume  Plan:   ~Torsemide for volume control, creatinine slightly up likely reflective of diuresis continue to monitor closely monitor electrolytes  Okay to discharge from renal standpoint when ready

## 2023-10-29 NOTE — GROUP NOTE
Group Topic: Other   Group Date: 10/29/2023  Start Time: 1510  End Time: 1550  Facilitators: JENNIFER Martin   Department: [facility] REHAB[facility] VIRTUAL    Number of Participants: 5   Group Focus: other Wellness [facility]  Treatment Modality: Other: Recreation Therapy  Interventions utilized were other mental fitness , patient education  Purpose: other: increase socialization, increase stimulation, increase self esteem    Name: Madalyn Ricci YOB: 1940   MR: [id number]      Facilitator: Recreational Therapist  Level of Participation: active  Quality of Participation: appropriate/pleasant, cooperative, and engaged  Interactions with others: appropriate  Mood/Affect: appropriate  Triggers (if applicable): n/a  Cognition: coherent/clear  Progress: Moderate  Comments: pt problem is depressed mood  Plan: continue with services

## 2023-10-29 NOTE — NURSING NOTE
WISAM NOTE     Problem:  Depression     Behavior:  Patient is sitting in the group room watching television. Patient is pleasant and calm. Patient's affect is blunted. Patient is talkative. Patient maintains good eye contact.    Group Participation: N/A  Appetite/Meals: N/A       Interventions:  This nurse completed a shift assessment and administered patient's scheduled night time medications.    Response:  Patient remained pleasant and calm and was cooperative throughout this shift assessment and medication administration.     Plan:  Continue to monitor for depression. Continue to monitor for patient safety.

## 2023-10-30 LAB
GLUCOSE BLD MANUAL STRIP-MCNC: 102 MG/DL (ref 74–99)
GLUCOSE BLD MANUAL STRIP-MCNC: 130 MG/DL (ref 74–99)
GLUCOSE BLD MANUAL STRIP-MCNC: 166 MG/DL (ref 74–99)
GLUCOSE BLD MANUAL STRIP-MCNC: 191 MG/DL (ref 74–99)

## 2023-10-30 PROCEDURE — 2500000004 HC RX 250 GENERAL PHARMACY W/ HCPCS (ALT 636 FOR OP/ED): Performed by: INTERNAL MEDICINE

## 2023-10-30 PROCEDURE — 97110 THERAPEUTIC EXERCISES: CPT | Mod: GP

## 2023-10-30 PROCEDURE — 82947 ASSAY GLUCOSE BLOOD QUANT: CPT

## 2023-10-30 PROCEDURE — 97116 GAIT TRAINING THERAPY: CPT | Mod: GP

## 2023-10-30 PROCEDURE — 1140000001 HC PRIVATE PSYCH ROOM DAILY

## 2023-10-30 PROCEDURE — 99232 SBSQ HOSP IP/OBS MODERATE 35: CPT | Performed by: REGISTERED NURSE

## 2023-10-30 PROCEDURE — 2500000001 HC RX 250 WO HCPCS SELF ADMINISTERED DRUGS (ALT 637 FOR MEDICARE OP): Performed by: INTERNAL MEDICINE

## 2023-10-30 PROCEDURE — 99232 SBSQ HOSP IP/OBS MODERATE 35: CPT | Performed by: INTERNAL MEDICINE

## 2023-10-30 PROCEDURE — 2500000001 HC RX 250 WO HCPCS SELF ADMINISTERED DRUGS (ALT 637 FOR MEDICARE OP): Performed by: PSYCHIATRY & NEUROLOGY

## 2023-10-30 RX ADMIN — ICOSAPENT ETHYL 1 G: 1 CAPSULE ORAL at 08:16

## 2023-10-30 RX ADMIN — ICOSAPENT ETHYL 1 G: 1 CAPSULE ORAL at 16:34

## 2023-10-30 RX ADMIN — INSULIN GLARGINE 40 UNITS: 100 INJECTION, SOLUTION SUBCUTANEOUS at 20:53

## 2023-10-30 RX ADMIN — AMLODIPINE BESYLATE 10 MG: 10 TABLET ORAL at 08:16

## 2023-10-30 RX ADMIN — PANTOPRAZOLE SODIUM 40 MG: 40 TABLET, DELAYED RELEASE ORAL at 08:16

## 2023-10-30 RX ADMIN — LOSARTAN POTASSIUM 100 MG: 50 TABLET, FILM COATED ORAL at 08:16

## 2023-10-30 RX ADMIN — ATORVASTATIN CALCIUM 40 MG: 40 TABLET, FILM COATED ORAL at 20:45

## 2023-10-30 RX ADMIN — CARVEDILOL 25 MG: 12.5 TABLET, FILM COATED ORAL at 16:34

## 2023-10-30 RX ADMIN — CARVEDILOL 25 MG: 12.5 TABLET, FILM COATED ORAL at 08:16

## 2023-10-30 RX ADMIN — Medication 400 MG: at 08:16

## 2023-10-30 RX ADMIN — LATANOPROST 1 DROP: 50 SOLUTION OPHTHALMIC at 20:45

## 2023-10-30 RX ADMIN — VENLAFAXINE HYDROCHLORIDE 150 MG: 150 CAPSULE, EXTENDED RELEASE ORAL at 08:16

## 2023-10-30 ASSESSMENT — COLUMBIA-SUICIDE SEVERITY RATING SCALE - C-SSRS
1. SINCE LAST CONTACT, HAVE YOU WISHED YOU WERE DEAD OR WISHED YOU COULD GO TO SLEEP AND NOT WAKE UP?: NO
2. HAVE YOU ACTUALLY HAD ANY THOUGHTS OF KILLING YOURSELF?: NO
6. HAVE YOU EVER DONE ANYTHING, STARTED TO DO ANYTHING, OR PREPARED TO DO ANYTHING TO END YOUR LIFE?: NO

## 2023-10-30 ASSESSMENT — PAIN SCALES - GENERAL
PAINLEVEL_OUTOF10: 0 - NO PAIN
PAINLEVEL_OUTOF10: 0 - NO PAIN

## 2023-10-30 ASSESSMENT — PAIN - FUNCTIONAL ASSESSMENT: PAIN_FUNCTIONAL_ASSESSMENT: 0-10

## 2023-10-30 NOTE — PROGRESS NOTES
Physical Therapy       10/30/23 [id number]   PT  Visit   PT Received On 10/30/23   General   Reason for Referral impaired mobility   Referred By Dr. Brooke   Past Medical History Relevant to Rehab HTN, hyperlipidemia, GERD, DM, neuropathy   Prior to Session Communication Bedside nurse   Patient Position Received Up in chair   Preferred Learning Style auditory;verbal   Cognition   Overall Cognitive Status WFL   Orientation Level Oriented X4   RUE    RUE  WFL   LUE    LUE WFL   RLE    RLE  WFL   LLE    LLE  WFL   Therapeutic Exercise   Therapeutic Exercise Activity 1 Seated LAQ 2x15  (2# BLE)   Therapeutic Exercise Activity 2 Seated Hip Flexion 2x15  (2# BLE)   Therapeutic Exercise Activity 3 Seated Hip Abd 2x15   Therapeutic Exercise Activity 4 seated Hip Add with ball  1X30   Therapeutic Exercise Activity 5 Standing Heel Raises 2x15 with RW   Therapeutic Exercise Activity 6 Standing Marches 1x15 with RW   Therapeutic Activity   Therapeutic Activity Performed Yes   Therapeutic Activity 1  Kleenex box from floor with RW cgx1   Therapeutic Activity 2 STS fromhall chair arms crossed 5x effortful, CGx1   Balance/Neuromuscular Re-Education   Balance/Neuromuscular Re-Education Activity Performed Yes   Balance/Neuromuscular Re-Education Activity 1 Static stand WBOS CGx1 steady   Balance/Neuromuscular Re-Education Activity 2 Sidestepping without UE support min A with LOB, unsteady   PT Assessment   PT Assessment Results Decreased strength;Decreased endurance;Impaired balance;Decreased mobility;Decreased safety awareness;Impaired sensation;Obesity   Rehab Prognosis Good   Evaluation/Treatment Tolerance Patient tolerated treatment well   Strengths Ability to acquire knowledge   End of Session Communication Bedside nurse   Assessment Comment Patient demonstrates improvement in gait distance and mobility. Patient requires supervision overall for safe mobility.   End of Session Patient Position Up in chair   Education    Individual(s) Educated Patient   Education Provided Fall Risk   Education Comment Education provided on safe mobility techniques and techniques to decrease fall risk..   PT Plan   Inpatient/Swing Bed or Outpatient Inpatient   PT Plan   Treatment/Interventions Transfer training;Gait training;Balance training;Neuromuscular re-education;Strengthening;Endurance training;Therapeutic exercise;Therapeutic activity;Wheelchair management   PT Plan Skilled PT   PT Frequency 4 times per week   PT Discharge Recommendations Moderate intensity level of continued care

## 2023-10-30 NOTE — GROUP NOTE
Group Topic: Other   Group Date: 10/30/2023  Start Time: 1500  End Time: 1600  Facilitators: JENNIFER Gramajo   Department: [facility] REHAB VIRTUAL    Number of Participants: 5   Group Focus: other [employer] Game  Treatment Modality: Other: Recreation Therapy  Interventions utilized were mental fitness  Purpose: other: increase stimulation, stimulate memory, increase activity level    Name: Madalyn Ricci YOB: 1940   MR: [id number]      Facilitator: Recreational Therapist  Level of Participation: moderate  Quality of Participation: appropriate/pleasant, attentive, and cooperative  Interactions with others: appropriate  Mood/Affect: appropriate  Triggers (if applicable): n/a  Cognition: coherent/clear  Progress: Moderate  Comments: pt problem is depressed mood.   Plan: continue with services

## 2023-10-30 NOTE — GROUP NOTE
Group Topic: Music Therapy   Group Date: 10/30/2023  Start Time: 1000  End Time: 1100  Facilitators: ROOSEVELT Kelley; Mandi Payan   Department: [facility] EXPRESSIVE THER VIRTUAL    Number of Participants: 6   Group Focus: music therapy  Treatment Modality: Music Therapy  Interventions Utilized were: active music engagement, empathic listening/validating emotions, other movement to music, passive music engagement, sharing/discussion, and support      Name: Madalyn Ricci YOB: 1940   MR: [id number]      Level of Participation: moderate  Quality of Participation: appropriate/pleasant, attentive, cooperative, engaged, offered feedback, passive, and quiet  Interactions with others: appropriate  Mood/Affect: appropriate and positive  Cognition, Pre Treatment: attentive and capable  Cognition, Post Treatment: attentive and goal directed  Progress: Moderate  Plan: continue with services

## 2023-10-30 NOTE — GROUP NOTE
Group Topic: Self-Care/Wellness   Group Date: 10/30/2023  Start Time: 1100  End Time: 1140  Facilitators: JENNIFER Gramajo   Department: [facility] REHABTH VIRTUAL    Number of Participants: 5   Group Focus: other Self Care Tips  Treatment Modality: Other: Recreation Therapy  Interventions utilized were clarification, exploration, mental fitness, and patient education  Purpose: other: Increase attention, Enhance self esteem, increase stimulation, express feelings, increase activity level, elevated mood, increase socialization.    Name: Madalyn Ricci YOB: 1940   MR: [id number]      Facilitator: Recreational Therapist  Level of Participation: active  Quality of Participation: appropriate/pleasant, attentive, and cooperative  Interactions with others: appropriate  Mood/Affect: appropriate  Triggers (if applicable): n/a  Cognition: coherent/clear  Progress: Moderate  Comments: pt problem is depressed mood.    Plan: continue with services

## 2023-10-30 NOTE — PROGRESS NOTES
[facility]: [facility]  PROGRESS NOTE      Madalyn Ricci is a 83 y.o. female that is being seen  today for follow-up at [facility].  No chief complaint on file..  Subjective   Patient is being seen for follow-up of [facility].  Patient's blood pressure has been better controlled.  Blood sugars have been stable.  No recent falls or episodes of agitation.  Patient is usually in the wheelchair since she has difficulty with walking.  Denies any other symptoms.  Pt. Had  mild swelling over left wrist which is better now.      ROS  Negative for fever or chills  Negative for sore throat, ear pain, nasal discharge  Negative for cough, shortness of breath or wheezing  Negative for chest pain, palpitations, swelling of legs  Negative for abdominal pain, constipation, diarrhea, blood in the stools  Negative for urinary complaints  Negative for headache, dizziness, weakness or numbness  Negative for joint pain.  Positive for difficulty in walking  Positive for anxiety  All other systems reviewed and were negative   Vitals:    10/30/23 0814   BP: 139/78   Pulse: 78   Resp:    Temp:    SpO2:        Physical Exam  Constitutional: Patient does not appear to be in any acute distress  Head and Face: NCAT  Eyes: Normal external exam, EOMI  ENT: Normal external inspection of ears and nose. Oropharynx normal.  Cardiovascular: RRR, S1/S2, no murmurs, rubs, or gallops, radial pulses +2, no edema of extremities  Pulmonary: CTAB, no respiratory distress.  Abdomen: +BS, soft, non-tender, nondistended, no guarding or rebound, no masses noted  MSK: No joint swelling, normal movements of all extremities. Range of motion- normal.  Skin- No lesions, contusions, or erythema.  Peripheral puslses palpable bilaterally 2+  Neuro: AAO X3, Cranial nerves 2-12 grossly intact,DTR 2+ in all 4 limbs   Psychiatric: Judgment intact. Appropriate mood and behavior    Diagnostic Results   Lab Results   Component Value Date    GLUCOSE 199  (H) 10/16/2023    CALCIUM 9.3 10/16/2023     10/16/2023    K 3.7 10/16/2023    CO2 26 10/16/2023     (H) 10/16/2023    BUN 31 (H) 10/16/2023    CREATININE 1.20 10/16/2023     Lab Results   Component Value Date    ALT 11 10/16/2023    AST 16 10/16/2023    ALKPHOS 74 10/16/2023    BILITOT 0.3 10/16/2023     Lab Results   Component Value Date    WBC 6.4 10/16/2023    HGB 14.1 10/16/2023    HCT 44.1 10/16/2023    MCV 90 10/16/2023     10/16/2023     Lab Results   Component Value Date    CHOL 138 06/26/2020    CHOL 142 09/20/2019    CHOL 131 (L) 03/05/2019     Lab Results   Component Value Date    HDL 36 (L) 06/26/2020    HDL 36 (L) 09/20/2019    HDL 37 (L) 03/05/2019     Lab Results   Component Value Date    LDLCALC 44 (L) 06/26/2020    LDLCALC 36 (L) 09/20/2019    LDLCALC 49 (L) 03/05/2019     Lab Results   Component Value Date    TRIG 291 (H) 06/26/2020    TRIG 350 (H) 09/20/2019    TRIG 224 (H) 03/05/2019     Lab Results   Component Value Date    HGBA1C 6.4 (H) 12/12/2020     Other labs not included in the list above were reviewed either before or during this encounter.    History    Past Medical History:   Diagnosis Date    Diabetes mellitus (CMS/HCC)     Hypertension      Past Surgical History:   Procedure Laterality Date    APPENDECTOMY  10/13/2015    Appendectomy    CATARACT EXTRACTION  10/13/2015    Cataract Surgery    CHOLECYSTECTOMY  10/13/2015    Cholecystectomy    CT GUIDED IMAGING FOR NEEDLE PLACEMENT  2/4/2015    CT GUIDED IMAGING FOR NEEDLE PLACEMENT [facility]    HEMICOLECTOMY  10/13/2015    Hemicolectomy    HYSTERECTOMY  10/13/2015    Hysterectomy    LUNG LOBECTOMY  10/13/2015    Lung Lobectomy     No family history on file.  No Known Allergies  No current facility-administered medications on file prior to encounter.     Current Outpatient Medications on File Prior to Encounter   Medication Sig Dispense Refill    amLODIPine (Norvasc) 10 mg tablet Take 1 tablet (10 mg) by  "mouth once daily.      atorvastatin (Lipitor) 40 mg tablet Take 1 tablet (40 mg) by mouth once daily.      carvedilol (Coreg) 25 mg tablet Take 1 tablet (25 mg) by mouth 2 times a day with meals.      [] cefdinir (Omnicef) 300 mg capsule Take 1 capsule (300 mg) by mouth 2 times a day for 7 days. 14 capsule 0    icosapent ethyL (Vascepa) 1 gram capsule Take 1 capsule (1 g) by mouth 2 times a day with meals.      insulin aspart (NovoLOG U-100 Insulin aspart) 100 unit/mL injection Inject 100 Units under the skin 3 times a day before meals. Take as directed per insulin instructions.      insulin degludec (Tresiba FlexTouch U-200) 200 unit/mL (3 mL) injection Inject 40 Units under the skin once daily at bedtime. Take as directed per insulin instructions.      latanoprost (Xalatan) 0.005 % ophthalmic solution Administer 1 drop into both eyes once daily at bedtime.      losartan (Cozaar) 50 mg tablet Take 2 tablets (100 mg) by mouth once daily.      magnesium oxide (Mag-Ox) 400 mg tablet Take 1 tablet (400 mg) by mouth once daily.      omeprazole (PriLOSEC) 40 mg DR capsule Take 1 capsule (40 mg) by mouth once daily in the morning. Take before meals. Do not crush or chew.      pen needle, diabetic (BD Traci 2nd Gen Pen Needle) 32 gauge x \" needle       potassium chloride ER (Micro-K) 10 mEq ER capsule Take 2 capsules (20 mEq) by mouth once daily. Do not crush or chew.      venlafaxine XR (Effexor-XR) 150 mg 24 hr capsule Take 1 capsule (150 mg) by mouth once daily. Do not crush or chew.      [DISCONTINUED] ferrous sulfate 325 (65 Fe) MG EC tablet Take 65 mg by mouth 3 times a day with meals. Do not crush, chew, or split.      [DISCONTINUED] fluticasone propion-salmeteroL (Advair) 115-21 mcg/actuation inhaler Inhale 2 puffs 2 times a day. Rinse mouth with water after use to reduce aftertaste and incidence of candidiasis. Do not swallow.      [DISCONTINUED] hydroCHLOROthiazide (HYDRODiuril) 50 mg tablet Take 1 " tablet (50 mg) by mouth once daily.      [DISCONTINUED] vitamin E 450 mg (1000 unit) capsule Take 100 Units by mouth once daily.         There is no immunization history on file for this patient.  Patient's medical history was reviewed and updated either before or during this encounter.  ASSESSMENT / PLAN:  Active Hospital Problems    Hyperlipidemia      GERD (gastroesophageal reflux disease)      Type 2 diabetes mellitus without complication, with long-term current use of insulin (CMS/[facility])      Primary hypertension      *Mood disorder (CMS/[facility])    Patient's blood pressure is fairly controlled.  Blood sugars have been fairly controlled.  Denies any significant complaints.  Patient is on statins.  Patient is being seen by geropsych team.        Osiel Forman MD

## 2023-10-30 NOTE — PROGRESS NOTES
Nutrition Follow up Note    PO intake good, probable discharge soon    Lab Results   Component Value Date    WBC 6.4 10/16/2023    HGB 14.1 10/16/2023    HCT 44.1 10/16/2023     10/16/2023    CHOL 138 06/26/2020    TRIG 291 (H) 06/26/2020    HDL 36 (L) 06/26/2020    ALT 11 10/16/2023    AST 16 10/16/2023     10/16/2023    K 3.7 10/16/2023     (H) 10/16/2023    CREATININE 1.20 10/16/2023    BUN 31 (H) 10/16/2023    CO2 26 10/16/2023    TSH 0.66 10/09/2020    INR 1.0 04/06/2018    HGBA1C 6.4 (H) 12/12/2020    ALBUR 55 (H) 06/26/2020       Current Facility-Administered Medications:     acetaminophen (Tylenol) tablet 650 mg, 650 mg, oral, q4h PRN, Navin Brooke DO, 650 mg at 10/29/23 0755    alum-mag hydroxide-simeth (Mylanta) 200-200-20 mg/5 mL oral suspension 30 mL, 30 mL, oral, q6h PRN, Navin Brooke DO    amLODIPine (Norvasc) tablet 10 mg, 10 mg, oral, Daily, Osiel Forman MD, 10 mg at 10/30/23 0816    atorvastatin (Lipitor) tablet 40 mg, 40 mg, oral, Nightly, Osiel Forman MD, 40 mg at 10/29/23 2042    carvedilol (Coreg) tablet 25 mg, 25 mg, oral, BID with meals, Osiel Forman MD, 25 mg at 10/30/23 0816    dextrose 10 % in water (D10W) infusion, 0.3 g/kg/hr, intravenous, Once PRN, Osiel Forman MD    dextrose 50 % injection 25 g, 25 g, intravenous, q15 min PRN, Osiel Forman MD    diphenhydrAMINE (BENADryl) capsule 50 mg, 50 mg, oral, q6h PRN **OR** diphenhydrAMINE (BENADryl) injection 50 mg, 50 mg, intramuscular, Once PRN, Navin Brooke DO    glucagon (Glucagen) injection 1 mg, 1 mg, intramuscular, q15 min PRN, Osiel Forman MD    icosapent ethyL (Vascepa) capsule 1 g, 1 g, oral, BID with meals, Osiel Forman MD, 1 g at 10/30/23 0816    insulin glargine (Lantus) injection 40 Units, 40 Units, subcutaneous, Nightly, Osiel Forman MD, 40 Units at 10/29/23 2042    latanoprost (Xalatan) 0.005 % ophthalmic solution 1 drop, 1 drop, Both  "Eyes, Nightly, Osiel Forman MD, 1 drop at 10/29/23 2042    losartan (Cozaar) tablet 100 mg, 100 mg, oral, Daily, Osiel Forman MD, 100 mg at 10/30/23 0816    magnesium hydroxide (Milk of Magnesia) 400 mg/5 mL suspension 30 mL, 30 mL, oral, Daily PRN, Navin Brooke DO    magnesium oxide (Mag-Ox) tablet 400 mg, 400 mg, oral, Daily, Osiel Forman MD, 400 mg at 10/30/23 0816    OLANZapine (ZyPREXA) tablet 5 mg, 5 mg, oral, q6h PRN **OR** OLANZapine (ZyPREXA) injection 5 mg, 5 mg, intramuscular, q6h PRN, Navin Brooke DO    pantoprazole (ProtoNix) EC tablet 40 mg, 40 mg, oral, Daily before breakfast, Osiel Forman MD, 40 mg at 10/30/23 0816    traZODone (Desyrel) tablet 25 mg, 25 mg, oral, Nightly PRN, Navin Brooke DO    venlafaxine XR (Effexor-XR) 24 hr capsule 150 mg, 150 mg, oral, Daily, Navin Brooke DO, 150 mg at 10/30/23 0816      Dietary Orders (From admission, onward)       Start     Ordered    10/17/23 1405  Adult diet Carb Controlled; 60 gram carb/meal, 30 gram Carb evening snack; 2 - 3 gm Sodium, 70 gm fat  Diet effective now        Question Answer Comment   Diet type Carb Controlled    Carb diet selection: 60 gram carb/meal, 30 gram Carb evening snack    Other restriction(s): 2 - 3 gm Sodium    Other restriction(s): 70 gm fat        10/17/23 1409                   Food and Nutrient History  Energy Intake: Good > 75 %    Anthropometrics:  Ht: 162.6 cm (5' 4.02\"), Wt: 127 kg (278 lb 15.9 oz), BMI: 47.87     IBW/kg (Dietitian Calculated):  (54.55)     Adjusted Body Weight (kg): 72.73 kg     Estimated Energy Needs  Total Energy Estimated Needs (kCal): 1815 kCal  Total Estimated Energy Need per Day (kCal/kg): 25 kCal/kg  Method for Estimating Needs: adjusted wt    Estimated Protein Needs  Total Protein Estimated Needs (g): 73 g  Total Protein Estimated Needs (g/kg): 1 g/kg  Method for Estimating Needs: adjusted wt    Estimated Fluid Needs  Total Fluid Estimated Needs " (mL): 1815 mL  Total Fluid Estimated Needs (mL/kg): 1 mL/kg  Method for Estimating Needs: adjusted wt     Nutrition Diagnosis:  Malnutrition Diagnosis  Patient has Malnutrition Diagnosis: No    Patient has Nutrition Diagnosis: No      Nutrition Interventions/Recommendations:  Food and/or Nutrient Delivery Interventions: none    Education Documentation  No documentation found.    Nutrition Monitoring/Evaluation:  Food and Nutrient Related History     Follow Up  Time Spent (min): 20 minutes  Last Date of Nutrition Visit: 10/30/23  Nutrition Follow-Up Needed?: 7-10 days  Follow up Comment: 11/8/23

## 2023-10-30 NOTE — NURSING NOTE
WISAM NOTE     Problem:  Depression     Behavior:  Patient is sitting sitting in the group room watching television and socially interacting with another patient. Patient is pleasant and calm. Patient's affect is blunted. Patient is talkative. Patient maintains good eye contact.    Group Participation: N/A  Appetite/Meals: N/A       Interventions:  This nurse completed a shift assessment and administered patient's scheduled night time medications.    Response:  Patient remained pleasant and calm and was cooperative throughout this shift assessment and medication administration.     Plan:  Continue to monitor for depression. Continue to monitor for patient safety.

## 2023-10-30 NOTE — PROGRESS NOTES
Madalyn Ricci is a 83 y.o. female on day 13 of admission presenting with Mood disorder (CMS/HCC).      Patient is an 82yo female admitted for Mood Disorder.    Case discussed in morning team.    Vitals signs reviewed  Patient case was discussed in morning team.  She slept 8hs appetite is good, mood is good(clarified that nursing reported mild depression but she denies- says she is feeling well). Taking meds as ordered. No prns required. She is attending groups.  Interviewed in common area today. Social work is looking into discharge plan. [facility] was accepted and social work is attempting to find SNF placement. Iain is reviewingIn November she will get her social security check and will hopefully be able to pay for assisted living if accomodations are not found before then.    Past Medical History  She has a past medical history of Diabetes mellitus (CMS/HCC) and Hypertension.    Past Psychiatric History:   Previous therapy: no  Previous psychiatric treatment and medication trials: no  Previous psychiatric hospitalizations: no  Previous diagnoses: no  Previous suicide attempts: no  History of violence: no  Currently in treatment with N/A.  Depression screening was performed with standardized tool: Yes, Depression    Surgical History  She has a past surgical history that includes Lung lobectomy (10/13/2015); Hysterectomy (10/13/2015); Cholecystectomy (10/13/2015); Appendectomy (10/13/2015); Hemicolectomy (10/13/2015); Cataract extraction (10/13/2015); and CT guided imaging for needle placement (2/4/2015).     Social History  She reports that she has never smoked. She has never used smokeless tobacco. No history on file for alcohol use and drug use.     Allergies  Patient has no known allergies.    Review of Systems    Psychiatric ROS - Adult  Anxiety: Mild  Depression: negative  Delirium: negative  Psychosis: negative  Margret: negative  Safety Issues: none  Psychiatric ROS Comment: The patient states that she is  "not suicidal, she did  and denies AVH. The patient states that she is not depressed and the only concern now is a caring and safe environment for her to live on.    Physical Exam      Mental Status Exam  General: NAD  Appearance: Hospital attire  Attitude: Accepting  Behavior: cooperative  Motor Activity: using walker  Speech: normal rate and volume  Mood: improving  Affect: more open  Thought Process: more organized  Thought Content: no delusions elicited, denies SI/HI  Thought Perception: denies AH/VH/paranoia  Cognition: fair  Insight: fair  Judgement: fair    Psychiatric Risk Assessment  Violence Risk Assessment: none  Acute Risk of Harm to Others is Considered: low   Suicide Risk Assessment: age > 65 yrs old, , chronic medical illness, history of trauma or abuse, and living alone or lack of social support  Protective Factors against Suicide: fear of suicide and Druze affiliation/spirituality  Acute Risk of Harm to Self is Considered: low    Last Recorded Vitals  Blood pressure (!) 128/93, pulse 84, temperature 36.1 °C (97 °F), temperature source Temporal, resp. rate 17, height 1.626 m (5' 4.02\"), weight 127 kg (278 lb 15.9 oz), SpO2 99 %.    Relevant Results      Current Facility-Administered Medications:     acetaminophen (Tylenol) tablet 650 mg, 650 mg, oral, q4h PRN, Navin Brooke DO, 650 mg at 10/29/23 0755    alum-mag hydroxide-simeth (Mylanta) 200-200-20 mg/5 mL oral suspension 30 mL, 30 mL, oral, q6h PRN, Navin Brooke DO    amLODIPine (Norvasc) tablet 10 mg, 10 mg, oral, Daily, Osiel Forman MD, 10 mg at 10/30/23 0816    atorvastatin (Lipitor) tablet 40 mg, 40 mg, oral, Nightly, Osiel Forman MD, 40 mg at 10/29/23 2042    carvedilol (Coreg) tablet 25 mg, 25 mg, oral, BID with meals, Osiel Forman MD, 25 mg at 10/30/23 0816    dextrose 10 % in water (D10W) infusion, 0.3 g/kg/hr, intravenous, Once PRN, Osiel Forman MD    dextrose 50 % injection 25 g, 25 g, " intravenous, q15 min PRN, Osiel Forman MD    diphenhydrAMINE (BENADryl) capsule 50 mg, 50 mg, oral, q6h PRN **OR** diphenhydrAMINE (BENADryl) injection 50 mg, 50 mg, intramuscular, Once PRN, Navin Brooke DO    glucagon (Glucagen) injection 1 mg, 1 mg, intramuscular, q15 min PRN, Osiel Forman MD    icosapent ethyL (Vascepa) capsule 1 g, 1 g, oral, BID with meals, Osiel Forman MD, 1 g at 10/30/23 0816    insulin glargine (Lantus) injection 40 Units, 40 Units, subcutaneous, Nightly, Osiel Forman MD, 40 Units at 10/29/23 2042    latanoprost (Xalatan) 0.005 % ophthalmic solution 1 drop, 1 drop, Both Eyes, Nightly, Osiel Forman MD, 1 drop at 10/29/23 2042    losartan (Cozaar) tablet 100 mg, 100 mg, oral, Daily, Osiel Forman MD, 100 mg at 10/30/23 0816    magnesium hydroxide (Milk of Magnesia) 400 mg/5 mL suspension 30 mL, 30 mL, oral, Daily PRN, Navin Brooke DO    magnesium oxide (Mag-Ox) tablet 400 mg, 400 mg, oral, Daily, Osiel Forman MD, 400 mg at 10/30/23 0816    OLANZapine (ZyPREXA) tablet 5 mg, 5 mg, oral, q6h PRN **OR** OLANZapine (ZyPREXA) injection 5 mg, 5 mg, intramuscular, q6h PRN, Navin Brooke DO    pantoprazole (ProtoNix) EC tablet 40 mg, 40 mg, oral, Daily before breakfast, Osiel Forman MD, 40 mg at 10/30/23 0816    traZODone (Desyrel) tablet 25 mg, 25 mg, oral, Nightly PRN, Navin Brooke DO    venlafaxine XR (Effexor-XR) 24 hr capsule 150 mg, 150 mg, oral, Daily, Navin Brooke DO, 150 mg at 10/30/23 0816      Assessment/Plan   Principal Problem:    Mood disorder (CMS/[facility])  Active Problems:    Hyperlipidemia    GERD (gastroesophageal reflux disease)    Type 2 diabetes mellitus without complication, with long-term current use of insulin (CMS/[facility])    Primary hypertension    Continue meds as ordered.   Continue daily rounds.   Continue to encourage scheduled medication.  Encourage participation in group therapy.  Appreciate  social work arranging placement in assisted living   Provider did peer review with [facility] which was denied.         Medication Consent  Medication Consent: risks, benefits, side effects reviewed for all ordered medications    I spent 30  minutes in the professional and overall care of this patient.        Madalyn Ricci is a 83 y.o. female on day 13 of admission presenting with Mood disorder (CMS/[facility]).Madalyn Ricci is a 83 y.o. female on day 13 of admission presenting with Mood disorder (CMS/[facility]).Madalyn Ricci is a 83 y.o. female on day 13 of admission presenting with Mood disorder (CMS/[facility]).      Patient is an 84yo female admitted for Mood Disorder.    Case discussed in morning team.    Vitals signs reviewed  Patient case was discussed in morning team.  She slept 8hs appetite is good, mood is good. Taking meds as ordered. No prns required. She is attending groups.  Interviewed in common area today. Reports mood is good and she is looking forward to transitioning to residential placement as soon as possible.Social work is looking into discharge plan. [facility] was accepted and social work is attempting to find SNF placement. Iain is reviewingIn November she will get her social security check and will hopefully be able to pay for assisted living if accomodations are not found before then.    Past Medical History  She has a past medical history of Diabetes mellitus (CMS/[facility]) and Hypertension.    Past Psychiatric History:   Previous therapy: no  Previous psychiatric treatment and medication trials: no  Previous psychiatric hospitalizations: no  Previous diagnoses: no  Previous suicide attempts: no  History of violence: no  Currently in treatment with N/A.  Depression screening was performed with standardized tool: Yes, Depression    Surgical History  She has a past surgical history that includes Lung lobectomy (10/13/2015); Hysterectomy (10/13/2015); Cholecystectomy (10/13/2015); Appendectomy (10/13/2015);  "Hemicolectomy (10/13/2015); Cataract extraction (10/13/2015); and CT guided imaging for needle placement (2/4/2015).     Social History  She reports that she has never smoked. She has never used smokeless tobacco. No history on file for alcohol use and drug use.     Allergies  Patient has no known allergies.    Review of Systems    Psychiatric ROS - Adult  Anxiety: Mild  Depression: negative  Delirium: negative  Psychosis: negative  Margret: negative  Safety Issues: none  Psychiatric ROS Comment: The patient states that she is not suicidal, she did  and denies AVH. The patient states that she is not depressed and the only concern now is a caring and safe environment for her to live on.    Physical Exam      Mental Status Exam  General: NAD  Appearance: Hospital attire  Attitude: Accepting  Behavior: cooperative  Motor Activity: using walker  Speech: normal rate and volume  Mood: \"good\"  Affect: more open  Thought Process: more organized  Thought Content: no delusions elicited, denies SI/HI  Thought Perception: denies AH/VH/paranoia  Cognition: fair  Insight: fair  Judgement: fair    Psychiatric Risk Assessment  Violence Risk Assessment: none  Acute Risk of Harm to Others is Considered: low   Suicide Risk Assessment: age > 65 yrs old, , chronic medical illness, history of trauma or abuse, and living alone or lack of social support  Protective Factors against Suicide: fear of suicide and Islam affiliation/spirituality  Acute Risk of Harm to Self is Considered: low    Last Recorded Vitals  Blood pressure (!) 128/93, pulse 84, temperature 36.1 °C (97 °F), temperature source Temporal, resp. rate 17, height 1.626 m (5' 4.02\"), weight 127 kg (278 lb 15.9 oz), SpO2 99 %.    Relevant Results      Current Facility-Administered Medications:     acetaminophen (Tylenol) tablet 650 mg, 650 mg, oral, q4h PRN, Navin Brooke, , 650 mg at 10/29/23 0755    alum-mag hydroxide-simeth (Mylanta) 200-200-20 mg/5 mL oral " suspension 30 mL, 30 mL, oral, q6h PRN, Navin Brooke DO    amLODIPine (Norvasc) tablet 10 mg, 10 mg, oral, Daily, Osiel Forman MD, 10 mg at 10/30/23 0816    atorvastatin (Lipitor) tablet 40 mg, 40 mg, oral, Nightly, Osiel Forman MD, 40 mg at 10/29/23 2042    carvedilol (Coreg) tablet 25 mg, 25 mg, oral, BID with meals, Osiel Forman MD, 25 mg at 10/30/23 0816    dextrose 10 % in water (D10W) infusion, 0.3 g/kg/hr, intravenous, Once PRN, Osiel Forman MD    dextrose 50 % injection 25 g, 25 g, intravenous, q15 min PRN, Osiel Forman MD    diphenhydrAMINE (BENADryl) capsule 50 mg, 50 mg, oral, q6h PRN **OR** diphenhydrAMINE (BENADryl) injection 50 mg, 50 mg, intramuscular, Once PRN, Navin Brooke DO    glucagon (Glucagen) injection 1 mg, 1 mg, intramuscular, q15 min PRN, Osiel Forman MD    icosapent ethyL (Vascepa) capsule 1 g, 1 g, oral, BID with meals, Osiel Forman MD, 1 g at 10/30/23 0816    insulin glargine (Lantus) injection 40 Units, 40 Units, subcutaneous, Nightly, Osiel Forman MD, 40 Units at 10/29/23 2042    latanoprost (Xalatan) 0.005 % ophthalmic solution 1 drop, 1 drop, Both Eyes, Nightly, Osiel Forman MD, 1 drop at 10/29/23 2042    losartan (Cozaar) tablet 100 mg, 100 mg, oral, Daily, Osiel Forman MD, 100 mg at 10/30/23 0816    magnesium hydroxide (Milk of Magnesia) 400 mg/5 mL suspension 30 mL, 30 mL, oral, Daily PRN, Navin Brooke DO    magnesium oxide (Mag-Ox) tablet 400 mg, 400 mg, oral, Daily, Osiel Forman MD, 400 mg at 10/30/23 0816    OLANZapine (ZyPREXA) tablet 5 mg, 5 mg, oral, q6h PRN **OR** OLANZapine (ZyPREXA) injection 5 mg, 5 mg, intramuscular, q6h PRN, Navin Brooke DO    pantoprazole (ProtoNix) EC tablet 40 mg, 40 mg, oral, Daily before breakfast, Osiel Forman MD, 40 mg at 10/30/23 0816    traZODone (Desyrel) tablet 25 mg, 25 mg, oral, Nightly PRN, Nvain Brooke DO    venlafaxine XR  (Effexor-XR) 24 hr capsule 150 mg, 150 mg, oral, Daily, Navin Brooke, , 150 mg at 10/30/23 0816      Assessment/Plan   Principal Problem:    Mood disorder (CMS/HCC)  Active Problems:    Hyperlipidemia    GERD (gastroesophageal reflux disease)    Type 2 diabetes mellitus without complication, with long-term current use of insulin (CMS/HCC)    Primary hypertension    Continue meds as ordered.   Continue daily rounds.   Continue to encourage scheduled medication.  Encourage participation in group therapy.  Appreciate social work arranging placement in assisted living   Provider is attempting to set up peer review with [facility] for placement.         Medication Consent  Medication Consent: risks, benefits, side effects reviewed for all ordered medications    I spent 30  minutes in the professional and overall care of this patient.        Madalyn Ricci is a 83 y.o. female on day 13 of admission presenting with Mood disorder (CMS/HCC).Madalyn Ricci is a 83 y.o. female on day 13 of admission presenting with Mood disorder (CMS/HCC).Madalyn JARVIS Melissane is a 83 y.o. female on day 13 of admission presenting with Mood disorder (CMS/HCC).Madalyn Ricci is a 83 y.o. female on day 13 of admission presenting with Mood disorder (CMS/HCC).

## 2023-10-31 LAB
GLUCOSE BLD MANUAL STRIP-MCNC: 106 MG/DL (ref 74–99)
GLUCOSE BLD MANUAL STRIP-MCNC: 144 MG/DL (ref 74–99)
GLUCOSE BLD MANUAL STRIP-MCNC: 177 MG/DL (ref 74–99)
GLUCOSE BLD MANUAL STRIP-MCNC: 181 MG/DL (ref 74–99)

## 2023-10-31 PROCEDURE — 1140000001 HC PRIVATE PSYCH ROOM DAILY

## 2023-10-31 PROCEDURE — 2500000001 HC RX 250 WO HCPCS SELF ADMINISTERED DRUGS (ALT 637 FOR MEDICARE OP): Performed by: PSYCHIATRY & NEUROLOGY

## 2023-10-31 PROCEDURE — 97110 THERAPEUTIC EXERCISES: CPT | Mod: GP

## 2023-10-31 PROCEDURE — 99232 SBSQ HOSP IP/OBS MODERATE 35: CPT | Performed by: INTERNAL MEDICINE

## 2023-10-31 PROCEDURE — 2500000001 HC RX 250 WO HCPCS SELF ADMINISTERED DRUGS (ALT 637 FOR MEDICARE OP): Performed by: INTERNAL MEDICINE

## 2023-10-31 PROCEDURE — 2500000004 HC RX 250 GENERAL PHARMACY W/ HCPCS (ALT 636 FOR OP/ED): Performed by: INTERNAL MEDICINE

## 2023-10-31 PROCEDURE — 99232 SBSQ HOSP IP/OBS MODERATE 35: CPT | Performed by: REGISTERED NURSE

## 2023-10-31 PROCEDURE — 82947 ASSAY GLUCOSE BLOOD QUANT: CPT

## 2023-10-31 PROCEDURE — 97116 GAIT TRAINING THERAPY: CPT | Mod: GP

## 2023-10-31 RX ADMIN — INSULIN GLARGINE 40 UNITS: 100 INJECTION, SOLUTION SUBCUTANEOUS at 20:14

## 2023-10-31 RX ADMIN — ICOSAPENT ETHYL 1 G: 1 CAPSULE ORAL at 17:11

## 2023-10-31 RX ADMIN — ATORVASTATIN CALCIUM 40 MG: 40 TABLET, FILM COATED ORAL at 20:14

## 2023-10-31 RX ADMIN — VENLAFAXINE HYDROCHLORIDE 150 MG: 150 CAPSULE, EXTENDED RELEASE ORAL at 08:16

## 2023-10-31 RX ADMIN — PANTOPRAZOLE SODIUM 40 MG: 40 TABLET, DELAYED RELEASE ORAL at 06:11

## 2023-10-31 RX ADMIN — ICOSAPENT ETHYL 1 G: 1 CAPSULE ORAL at 08:16

## 2023-10-31 RX ADMIN — Medication 400 MG: at 08:16

## 2023-10-31 RX ADMIN — CARVEDILOL 25 MG: 12.5 TABLET, FILM COATED ORAL at 17:11

## 2023-10-31 RX ADMIN — CARVEDILOL 25 MG: 12.5 TABLET, FILM COATED ORAL at 08:16

## 2023-10-31 RX ADMIN — AMLODIPINE BESYLATE 10 MG: 10 TABLET ORAL at 08:16

## 2023-10-31 RX ADMIN — LOSARTAN POTASSIUM 100 MG: 50 TABLET, FILM COATED ORAL at 08:16

## 2023-10-31 RX ADMIN — LATANOPROST 1 DROP: 50 SOLUTION OPHTHALMIC at 20:13

## 2023-10-31 ASSESSMENT — COLUMBIA-SUICIDE SEVERITY RATING SCALE - C-SSRS
6. HAVE YOU EVER DONE ANYTHING, STARTED TO DO ANYTHING, OR PREPARED TO DO ANYTHING TO END YOUR LIFE?: NO
2. HAVE YOU ACTUALLY HAD ANY THOUGHTS OF KILLING YOURSELF?: NO
1. SINCE LAST CONTACT, HAVE YOU WISHED YOU WERE DEAD OR WISHED YOU COULD GO TO SLEEP AND NOT WAKE UP?: NO
1. SINCE LAST CONTACT, HAVE YOU WISHED YOU WERE DEAD OR WISHED YOU COULD GO TO SLEEP AND NOT WAKE UP?: NO
6. HAVE YOU EVER DONE ANYTHING, STARTED TO DO ANYTHING, OR PREPARED TO DO ANYTHING TO END YOUR LIFE?: NO
2. HAVE YOU ACTUALLY HAD ANY THOUGHTS OF KILLING YOURSELF?: NO

## 2023-10-31 ASSESSMENT — PAIN SCALES - GENERAL
PAINLEVEL_OUTOF10: 0 - NO PAIN

## 2023-10-31 ASSESSMENT — PAIN - FUNCTIONAL ASSESSMENT
PAIN_FUNCTIONAL_ASSESSMENT: 0-10

## 2023-10-31 NOTE — PROGRESS NOTES
[facility]: [facility]  PROGRESS NOTE      Madalyn Ricci is a 83 y.o. female that is being seen  today for follow-up at [facility].  No chief complaint on file..  Subjective   Patient is being seen for follow-up of [facility].  Patient's blood pressure has been better controlled.  Blood sugars have been stable.  No recent falls or episodes of agitation.  Patient is usually in the wheelchair since she has difficulty with walking.  Denies any other symptoms.    ROS  Negative for fever or chills  Negative for sore throat, ear pain, nasal discharge  Negative for cough, shortness of breath or wheezing  Negative for chest pain, palpitations, swelling of legs  Negative for abdominal pain, constipation, diarrhea, blood in the stools  Negative for urinary complaints  Negative for headache, dizziness, weakness or numbness  Negative for joint pain.  Positive for difficulty in walking  Positive for anxiety  All other systems reviewed and were negative   Vitals:    10/31/23 0652   BP: 151/78   Pulse: 70   Resp: 18   Temp: 37.1 °C (98.8 °F)   SpO2: 96%        Physical Exam  Constitutional: Patient does not appear to be in any acute distress  Head and Face: NCAT  Eyes: Normal external exam, EOMI  ENT: Normal external inspection of ears and nose. Oropharynx normal.  Cardiovascular: RRR, S1/S2, no murmurs, rubs, or gallops, radial pulses +2, no edema of extremities  Pulmonary: CTAB, no respiratory distress.  Abdomen: +BS, soft, non-tender, nondistended, no guarding or rebound, no masses noted  MSK: No joint swelling, normal movements of all extremities. Range of motion- normal.  Skin- No lesions, contusions, or erythema.  Peripheral puslses palpable bilaterally 2+  Neuro: AAO X3, Cranial nerves 2-12 grossly intact,DTR 2+ in all 4 limbs   Psychiatric: Judgment intact. Appropriate mood and behavior    Diagnostic Results   Lab Results   Component Value Date    GLUCOSE 199 (H) 10/16/2023    CALCIUM 9.3 10/16/2023      10/16/2023    K 3.7 10/16/2023    CO2 26 10/16/2023     (H) 10/16/2023    BUN 31 (H) 10/16/2023    CREATININE 1.20 10/16/2023     Lab Results   Component Value Date    ALT 11 10/16/2023    AST 16 10/16/2023    ALKPHOS 74 10/16/2023    BILITOT 0.3 10/16/2023     Lab Results   Component Value Date    WBC 6.4 10/16/2023    HGB 14.1 10/16/2023    HCT 44.1 10/16/2023    MCV 90 10/16/2023     10/16/2023     Lab Results   Component Value Date    CHOL 138 06/26/2020    CHOL 142 09/20/2019    CHOL 131 (L) 03/05/2019     Lab Results   Component Value Date    HDL 36 (L) 06/26/2020    HDL 36 (L) 09/20/2019    HDL 37 (L) 03/05/2019     Lab Results   Component Value Date    LDLCALC 44 (L) 06/26/2020    LDLCALC 36 (L) 09/20/2019    LDLCALC 49 (L) 03/05/2019     Lab Results   Component Value Date    TRIG 291 (H) 06/26/2020    TRIG 350 (H) 09/20/2019    TRIG 224 (H) 03/05/2019     Lab Results   Component Value Date    HGBA1C 6.4 (H) 12/12/2020     Other labs not included in the list above were reviewed either before or during this encounter.    History    Past Medical History:   Diagnosis Date    Diabetes mellitus (CMS/HCC)     Hypertension      Past Surgical History:   Procedure Laterality Date    APPENDECTOMY  10/13/2015    Appendectomy    CATARACT EXTRACTION  10/13/2015    Cataract Surgery    CHOLECYSTECTOMY  10/13/2015    Cholecystectomy    CT GUIDED IMAGING FOR NEEDLE PLACEMENT  2/4/2015    CT GUIDED IMAGING FOR NEEDLE PLACEMENT [facility]    HEMICOLECTOMY  10/13/2015    Hemicolectomy    HYSTERECTOMY  10/13/2015    Hysterectomy    LUNG LOBECTOMY  10/13/2015    Lung Lobectomy     No family history on file.  No Known Allergies  No current facility-administered medications on file prior to encounter.     Current Outpatient Medications on File Prior to Encounter   Medication Sig Dispense Refill    amLODIPine (Norvasc) 10 mg tablet Take 1 tablet (10 mg) by mouth once daily.      atorvastatin (Lipitor)  "40 mg tablet Take 1 tablet (40 mg) by mouth once daily.      carvedilol (Coreg) 25 mg tablet Take 1 tablet (25 mg) by mouth 2 times a day with meals.      [] cefdinir (Omnicef) 300 mg capsule Take 1 capsule (300 mg) by mouth 2 times a day for 7 days. 14 capsule 0    icosapent ethyL (Vascepa) 1 gram capsule Take 1 capsule (1 g) by mouth 2 times a day with meals.      insulin aspart (NovoLOG U-100 Insulin aspart) 100 unit/mL injection Inject 100 Units under the skin 3 times a day before meals. Take as directed per insulin instructions.      insulin degludec (Tresiba FlexTouch U-200) 200 unit/mL (3 mL) injection Inject 40 Units under the skin once daily at bedtime. Take as directed per insulin instructions.      latanoprost (Xalatan) 0.005 % ophthalmic solution Administer 1 drop into both eyes once daily at bedtime.      losartan (Cozaar) 50 mg tablet Take 2 tablets (100 mg) by mouth once daily.      magnesium oxide (Mag-Ox) 400 mg tablet Take 1 tablet (400 mg) by mouth once daily.      omeprazole (PriLOSEC) 40 mg DR capsule Take 1 capsule (40 mg) by mouth once daily in the morning. Take before meals. Do not crush or chew.      pen needle, diabetic (BD Traci 2nd Gen Pen Needle) 32 gauge x \" needle       potassium chloride ER (Micro-K) 10 mEq ER capsule Take 2 capsules (20 mEq) by mouth once daily. Do not crush or chew.      venlafaxine XR (Effexor-XR) 150 mg 24 hr capsule Take 1 capsule (150 mg) by mouth once daily. Do not crush or chew.      [DISCONTINUED] ferrous sulfate 325 (65 Fe) MG EC tablet Take 65 mg by mouth 3 times a day with meals. Do not crush, chew, or split.      [DISCONTINUED] fluticasone propion-salmeteroL (Advair) 115-21 mcg/actuation inhaler Inhale 2 puffs 2 times a day. Rinse mouth with water after use to reduce aftertaste and incidence of candidiasis. Do not swallow.      [DISCONTINUED] hydroCHLOROthiazide (HYDRODiuril) 50 mg tablet Take 1 tablet (50 mg) by mouth once daily.      " [DISCONTINUED] vitamin E 450 mg (1000 unit) capsule Take 100 Units by mouth once daily.         There is no immunization history on file for this patient.  Patient's medical history was reviewed and updated either before or during this encounter.  ASSESSMENT / PLAN:  Active Hospital Problems    Hyperlipidemia      GERD (gastroesophageal reflux disease)      Type 2 diabetes mellitus without complication, with long-term current use of insulin (CMS/[facility])      Primary hypertension      *Mood disorder (CMS/[facility])    Patient's blood pressure is fairly controlled.  Blood sugars have been fairly controlled.  Denies any significant complaints.  Patient is on statins.  Patient is being seen by geropsych team.        Osiel Forman MD

## 2023-10-31 NOTE — NURSING NOTE
BIRP NOTE     Problem:  Depression     Behavior:  Pt sitting quietly in group room watching tv at start of shift. Pleasant upon approach, behavior in control. Compliant with meds, cooperative with care.   Group Participation: n/a  Appetite/Meals: HS snack provided     Interventions:  1:1 provided. Evening meds given per orders. Encouraged to use call light for needs.    Response:  Pt resting in bed at this time. No s/s of distress noted.     Plan:  Will continue to monitor behaviors and effectiveness of interventions while maintaining pt safety.

## 2023-10-31 NOTE — GROUP NOTE
Group Topic: Self-Care/Wellness   Group Date: 10/31/2023  Start Time: 1000  End Time: 1100  Facilitators: JENNIFER Gramajo   Department: [facility] REHABTH VIRTUAL    Number of Participants: 5   Group Focus: activities of daily living skills, daily focus, personal responsibility, and safety plan  Treatment Modality: Other: Recreation Therapy   Interventions utilized were clarification, mental fitness, patient education, problem solving, and reality testing  Purpose: other: increase attention/concentration, enhance self esteem, increase stimulation, stimulate memory, elevate mood, express feelings    Name: Madalyn Ricci YOB: 1940   MR: [id number]      Facilitator: Recreational Therapist  Level of Participation: active  Quality of Participation: appropriate/pleasant, attentive, cooperative, and engaged  Interactions with others: appropriate  Mood/Affect: appropriate  Triggers (if applicable): n/a  Cognition: coherent/clear  Progress: Moderate  Comments: pt problem is depressed mood.  Plan: continue with services

## 2023-10-31 NOTE — GROUP NOTE
Group Topic: Other   Group Date: 10/31/2023  Start Time: 1515  End Time: 1600  Facilitators: JENNIFER Gramajo   Department: [facility] REHABTH VIRTUAL    Number of Participants: 6   Group Focus: other Can you name 5...  Treatment Modality: Other: Recreation Therapy  Interventions utilized were mental fitness and reminiscence  Purpose: other: increase attention, enhance self esteem, increase stimulation, increase activity level,increase socialization.    Name: Madalyn Ricci YOB: 1940   MR: [id number]      Facilitator: Recreational Therapist  Level of Participation: active  Quality of Participation: appropriate/pleasant, attentive, and cooperative  Interactions with others: appropriate  Mood/Affect: appropriate  Triggers (if applicable): n/a  Cognition: coherent/clear  Progress: Moderate  Comments: pt problem is depressed mood.  Plan: continue with services

## 2023-10-31 NOTE — PROGRESS NOTES
10/31/23 [id number]   PT  Visit   PT Received On 10/31/23   Response to Previous Treatment Patient with no complaints from previous session.   General   Reason for Referral impaired mobility   Referred By Dr. Brooke   Past Medical History Relevant to Rehab HTN, hyperlipidemia, GERD, DM, neuropathy   Prior to Session Communication Bedside nurse   Patient Position Received Up in chair   Preferred Learning Style auditory;verbal   General Comment pleasant, cooperative, motivated for PT   Precautions   Medical Precautions Fall precautions   Pain Assessment   Pain Assessment 0-10   Pain Score 0 - No pain   Cognition   Overall Cognitive Status WFL   Orientation Level Oriented X4   Therapeutic Exercise   Therapeutic Exercise Performed Yes   Therapeutic Exercise Activity 1 hip flex 2x15 B LE   Therapeutic Exercise Activity 2 LAQ 2x15   Therapeutic Exercise Activity 3 hip adduction against ball 2x15   Therapeutic Exercise Activity 4 hip abduction against blue theraband   Therapeutic Exercise Activity 5 Standing Heel Raises 2x15 with RW   Therapeutic Exercise Activity 6 Standing Marches 1x15 with RW   Therapeutic Exercise Activity 7 Mini squats x 10 reps   Therapeutic Activity   Therapeutic Activity Performed Yes   Therapeutic Activity 1 Sit to stands 2x 5 reps no UE use CGA   Ambulation/Gait Training 1   Surface 1 Level tile   Device 1 Rolling walker   Assistance 1 Close supervision   Comments/Distance (ft) 1 100 ft x 2 with RW, WBOS, decreased step length   Transfer 1   Transfer From 1 Sit to   Transfer to 1 Stand   Technique 1 Sit to stand;Stand to sit   Transfer Device 1 Walker   Transfer Level of Assistance 1 Close supervision   Trials/Comments 1 cues for hand placement   Activity Tolerance   Endurance Tolerates 10 - 20 min exercise with multiple rests   PT Assessment   PT Assessment Results Decreased strength;Decreased endurance;Impaired balance;Decreased mobility;Decreased safety awareness   Rehab Prognosis Good    Evaluation/Treatment Tolerance Patient tolerated treatment well   Medical Staff Made Aware Yes   Assessment Comment Patient demonstrates improvement in gait distance and mobility. Patient requires supervision overall for safe mobility.   End of Session Patient Position Up in chair   Education   Individual(s) Educated Patient   Education Provided Fall Risk   Education Comment Education provided re: safe mobility techniques and to decrease risk for falls   PT Plan   Inpatient/Swing Bed or Outpatient Inpatient   PT Plan   Treatment/Interventions Transfer training;Gait training;Therapeutic exercise;Therapeutic activity   PT Plan Skilled PT   PT Frequency 4 times per week   Equipment Recommended upon Discharge Wheeled walker

## 2023-10-31 NOTE — PROGRESS NOTES
Madalyn Ricci is a 83 y.o. female on day 14 of admission presenting with Mood disorder (CMS/HCC).      Patient is an 84yo female admitted for Mood Disorder.    Case discussed in morning team.    Vitals signs reviewed  Patient case was discussed in morning team.  She slept 8hs appetite is good, mood is good(clarified that nursing reported mild depression but she denies- says she is feeling well). Taking meds as ordered. No prns required. She is attending groups.  Interviewed in common area today. Social work is looking into discharge plan. [facility] was accepted and social work is attempting to find SNF placement. Iain is reviewingIn November she will get her social security check and will hopefully be able to pay for assisted living if accomodations are not found before then.    Past Medical History  She has a past medical history of Diabetes mellitus (CMS/HCC) and Hypertension.    Past Psychiatric History:   Previous therapy: no  Previous psychiatric treatment and medication trials: no  Previous psychiatric hospitalizations: no  Previous diagnoses: no  Previous suicide attempts: no  History of violence: no  Currently in treatment with N/A.  Depression screening was performed with standardized tool: Yes, Depression    Surgical History  She has a past surgical history that includes Lung lobectomy (10/13/2015); Hysterectomy (10/13/2015); Cholecystectomy (10/13/2015); Appendectomy (10/13/2015); Hemicolectomy (10/13/2015); Cataract extraction (10/13/2015); and CT guided imaging for needle placement (2/4/2015).     Social History  She reports that she has never smoked. She has never used smokeless tobacco. No history on file for alcohol use and drug use.     Allergies  Patient has no known allergies.    Review of Systems    Psychiatric ROS - Adult  Anxiety: Mild  Depression: negative  Delirium: negative  Psychosis: negative  Margret: negative  Safety Issues: none  Psychiatric ROS Comment: The patient states that she is  "not suicidal, she did  and denies AVH. The patient states that she is not depressed and the only concern now is a caring and safe environment for her to live on.    Physical Exam      Mental Status Exam  General: NAD  Appearance: Hospital attire  Attitude: Accepting  Behavior: cooperative  Motor Activity: using walker  Speech: normal rate and volume  Mood: improving  Affect: more open  Thought Process: more organized  Thought Content: no delusions elicited, denies SI/HI  Thought Perception: denies AH/VH/paranoia  Cognition: fair  Insight: fair  Judgement: fair    Psychiatric Risk Assessment  Violence Risk Assessment: none  Acute Risk of Harm to Others is Considered: low   Suicide Risk Assessment: age > 65 yrs old, , chronic medical illness, history of trauma or abuse, and living alone or lack of social support  Protective Factors against Suicide: fear of suicide and Taoist affiliation/spirituality  Acute Risk of Harm to Self is Considered: low    Last Recorded Vitals  Blood pressure (!) 128/93, pulse 84, temperature 36.1 °C (97 °F), temperature source Temporal, resp. rate 17, height 1.626 m (5' 4.02\"), weight 127 kg (278 lb 15.9 oz), SpO2 99 %.    Relevant Results      Current Facility-Administered Medications:     acetaminophen (Tylenol) tablet 650 mg, 650 mg, oral, q4h PRN, Navin Brooke DO, 650 mg at 10/29/23 0755    alum-mag hydroxide-simeth (Mylanta) 200-200-20 mg/5 mL oral suspension 30 mL, 30 mL, oral, q6h PRN, Navin Brooke DO    amLODIPine (Norvasc) tablet 10 mg, 10 mg, oral, Daily, Osiel Forman MD, 10 mg at 10/31/23 0816    atorvastatin (Lipitor) tablet 40 mg, 40 mg, oral, Nightly, Osiel Forman MD, 40 mg at 10/30/23 2045    carvedilol (Coreg) tablet 25 mg, 25 mg, oral, BID with meals, Osiel Forman MD, 25 mg at 10/31/23 0816    dextrose 10 % in water (D10W) infusion, 0.3 g/kg/hr, intravenous, Once PRN, Osiel Forman MD    dextrose 50 % injection 25 g, 25 g, " intravenous, q15 min PRN, Osiel Forman MD    diphenhydrAMINE (BENADryl) capsule 50 mg, 50 mg, oral, q6h PRN **OR** diphenhydrAMINE (BENADryl) injection 50 mg, 50 mg, intramuscular, Once PRN, Navin Brooke DO    glucagon (Glucagen) injection 1 mg, 1 mg, intramuscular, q15 min PRN, Osiel Forman MD    icosapent ethyL (Vascepa) capsule 1 g, 1 g, oral, BID with meals, Osiel Forman MD, 1 g at 10/31/23 0816    insulin glargine (Lantus) injection 40 Units, 40 Units, subcutaneous, Nightly, Osiel Forman MD, 40 Units at 10/30/23 2053    latanoprost (Xalatan) 0.005 % ophthalmic solution 1 drop, 1 drop, Both Eyes, Nightly, Osiel Forman MD, 1 drop at 10/30/23 2045    losartan (Cozaar) tablet 100 mg, 100 mg, oral, Daily, Osiel Forman MD, 100 mg at 10/31/23 0816    magnesium hydroxide (Milk of Magnesia) 400 mg/5 mL suspension 30 mL, 30 mL, oral, Daily PRN, Navin Brooke DO    magnesium oxide (Mag-Ox) tablet 400 mg, 400 mg, oral, Daily, Osiel Forman MD, 400 mg at 10/31/23 0816    OLANZapine (ZyPREXA) tablet 5 mg, 5 mg, oral, q6h PRN **OR** OLANZapine (ZyPREXA) injection 5 mg, 5 mg, intramuscular, q6h PRN, Navin Brooke DO    pantoprazole (ProtoNix) EC tablet 40 mg, 40 mg, oral, Daily before breakfast, Osiel Forman MD, 40 mg at 10/31/23 0611    traZODone (Desyrel) tablet 25 mg, 25 mg, oral, Nightly PRN, Navin Brooke DO    venlafaxine XR (Effexor-XR) 24 hr capsule 150 mg, 150 mg, oral, Daily, Navin Brooke DO, 150 mg at 10/31/23 0816      Assessment/Plan   Principal Problem:    Mood disorder (CMS/[facility])  Active Problems:    Hyperlipidemia    GERD (gastroesophageal reflux disease)    Type 2 diabetes mellitus without complication, with long-term current use of insulin (CMS/[facility])    Primary hypertension    Continue meds as ordered.   Continue daily rounds.   Continue to encourage scheduled medication.  Encourage participation in group therapy.  Appreciate  social work arranging placement in assisted living   Provider did peer review with [facility] which was denied.         Medication Consent  Medication Consent: risks, benefits, side effects reviewed for all ordered medications    I spent 30  minutes in the professional and overall care of this patient.        Madalyn Ricci is a 83 y.o. female on day 14 of admission presenting with Mood disorder (CMS/[facility]).Madalyn Ricci is a 83 y.o. female on day 14 of admission presenting with Mood disorder (CMS/[facility]).Madalyn Ricci is a 83 y.o. female on day 14 of admission presenting with Mood disorder (CMS/[facility]).      Patient is an 82yo female admitted for Mood Disorder.    Case discussed in morning team.    Vitals signs reviewed  Patient case was discussed in morning team.  She slept 8hs appetite is good, mood is good. Taking meds as ordered. No prns required. She is attending groups.  She is participating in PT/OT.  Social work is looking into discharge plan. [facility] was accepted and social work is attempting to find SNF placement. Iain is reviewingIn November she will get her social security check and will hopefully be able to pay for assisted living if accomodations are not found before then.    Past Medical History  She has a past medical history of Diabetes mellitus (CMS/[facility]) and Hypertension.    Past Psychiatric History:   Previous therapy: no  Previous psychiatric treatment and medication trials: no  Previous psychiatric hospitalizations: no  Previous diagnoses: no  Previous suicide attempts: no  History of violence: no  Currently in treatment with N/A.  Depression screening was performed with standardized tool: Yes, Depression    Surgical History  She has a past surgical history that includes Lung lobectomy (10/13/2015); Hysterectomy (10/13/2015); Cholecystectomy (10/13/2015); Appendectomy (10/13/2015); Hemicolectomy (10/13/2015); Cataract extraction (10/13/2015); and CT guided imaging for needle placement (2/4/2015).    "  Social History  She reports that she has never smoked. She has never used smokeless tobacco. No history on file for alcohol use and drug use.     Allergies  Patient has no known allergies.    Review of Systems    Psychiatric ROS - Adult  Anxiety: Mild  Depression: negative  Delirium: negative  Psychosis: negative  Margret: negative  Safety Issues: none  Psychiatric ROS Comment: The patient states that she is not suicidal,  and denies AVH. The patient states that she is not depressed and the only concern now is a caring and safe environment for her to live in.    Physical Exam      Mental Status Exam  General: NAD  Appearance: Hospital attire  Attitude: Accepting  Behavior: cooperative  Motor Activity: using walker  Speech: normal rate and volume  Mood: \"good\"  Affect: more open  Thought Process: more organized  Thought Content: no delusions elicited, denies SI/HI  Thought Perception: denies AH/VH/paranoia  Cognition: fair  Insight: fair  Judgement: fair    Psychiatric Risk Assessment  Violence Risk Assessment: none  Acute Risk of Harm to Others is Considered: low   Suicide Risk Assessment: age > 65 yrs old, , chronic medical illness, history of trauma or abuse, and living alone or lack of social support  Protective Factors against Suicide: fear of suicide and Christianity affiliation/spirituality  Acute Risk of Harm to Self is Considered: low    Last Recorded Vitals  Blood pressure (!) 128/93, pulse 84, temperature 36.1 °C (97 °F), temperature source Temporal, resp. rate 17, height 1.626 m (5' 4.02\"), weight 127 kg (278 lb 15.9 oz), SpO2 99 %.    Relevant Results      Current Facility-Administered Medications:     acetaminophen (Tylenol) tablet 650 mg, 650 mg, oral, q4h PRN, Navin Brooke DO, 650 mg at 10/29/23 0755    alum-mag hydroxide-simeth (Mylanta) 200-200-20 mg/5 mL oral suspension 30 mL, 30 mL, oral, q6h PRN, Navin Brooke DO    amLODIPine (Norvasc) tablet 10 mg, 10 mg, oral, Daily, Osiel " LUZ ELENA Forman MD, 10 mg at 10/31/23 0816    atorvastatin (Lipitor) tablet 40 mg, 40 mg, oral, Nightly, Osiel Forman MD, 40 mg at 10/30/23 2045    carvedilol (Coreg) tablet 25 mg, 25 mg, oral, BID with meals, Osiel Forman MD, 25 mg at 10/31/23 0816    dextrose 10 % in water (D10W) infusion, 0.3 g/kg/hr, intravenous, Once PRN, Osiel Forman MD    dextrose 50 % injection 25 g, 25 g, intravenous, q15 min PRN, Osiel Forman MD    diphenhydrAMINE (BENADryl) capsule 50 mg, 50 mg, oral, q6h PRN **OR** diphenhydrAMINE (BENADryl) injection 50 mg, 50 mg, intramuscular, Once PRN, Navin Brooke DO    glucagon (Glucagen) injection 1 mg, 1 mg, intramuscular, q15 min PRN, Osiel Forman MD    icosapent ethyL (Vascepa) capsule 1 g, 1 g, oral, BID with meals, Osiel Forman MD, 1 g at 10/31/23 0816    insulin glargine (Lantus) injection 40 Units, 40 Units, subcutaneous, Nightly, Osiel Forman MD, 40 Units at 10/30/23 2053    latanoprost (Xalatan) 0.005 % ophthalmic solution 1 drop, 1 drop, Both Eyes, Nightly, Osiel Forman MD, 1 drop at 10/30/23 2045    losartan (Cozaar) tablet 100 mg, 100 mg, oral, Daily, Osiel Forman MD, 100 mg at 10/31/23 0816    magnesium hydroxide (Milk of Magnesia) 400 mg/5 mL suspension 30 mL, 30 mL, oral, Daily PRN, Navin Brooke DO    magnesium oxide (Mag-Ox) tablet 400 mg, 400 mg, oral, Daily, Osiel Forman MD, 400 mg at 10/31/23 0816    OLANZapine (ZyPREXA) tablet 5 mg, 5 mg, oral, q6h PRN **OR** OLANZapine (ZyPREXA) injection 5 mg, 5 mg, intramuscular, q6h PRN, Navin Brooke DO    pantoprazole (ProtoNix) EC tablet 40 mg, 40 mg, oral, Daily before breakfast, Osiel Forman MD, 40 mg at 10/31/23 0611    traZODone (Desyrel) tablet 25 mg, 25 mg, oral, Nightly PRN, Navin Brooke DO    venlafaxine XR (Effexor-XR) 24 hr capsule 150 mg, 150 mg, oral, Daily, Navin Brooke DO, 150 mg at 10/31/23 0816      Assessment/Plan    Principal Problem:    Mood disorder (CMS/HCC)  Active Problems:    Hyperlipidemia    GERD (gastroesophageal reflux disease)    Type 2 diabetes mellitus without complication, with long-term current use of insulin (CMS/[facility])    Primary hypertension    Continue meds as ordered  Continue daily rounds.   Continue to encourage scheduled medication.  Encourage participation in group therapy.  Appreciate social work arranging placement in assisted living   Provider is attempting to set up peer review with [facility] for placement.         Medication Consent  Medication Consent: risks, benefits, side effects reviewed for all ordered medications    I spent 30  minutes in the professional and overall care of this patient.        Madalyn Ricci is a 83 y.o. female on day 14 of admission presenting with Mood disorder (CMS/HCC).Madalyn Ricci is a 83 y.o. female on day 14 of admission presenting with Mood disorder (CMS/HCC).Madalyn Ricci is a 83 y.o. female on day 14 of admission presenting with Mood disorder (CMS/HCC).Madalyn Ricci is a 83 y.o. female on day 14 of admission presenting with Mood disorder (CMS/HCC).Madalyn Ricci is a 83 y.o. female on day 14 of admission presenting with Mood disorder (CMS/HCC).

## 2023-10-31 NOTE — CARE PLAN
The patient's goals for the shift include get ready for d/c    The clinical goals for the shift include No falls    Over the shift, the patient did make progress toward the following goals. Barriers to progression include some energy deficit. Recommendations to address these barriers include encouragement to rest between meals & groups.      Problem: Fall/Injury  Goal: Verbalize understanding of personal risk factors for fall in the hospital  Outcome: Progressing     Problem: Potential for Harm to Self or Others  Goal: Patient/Family participate in treatment and discharge plans  Outcome: Progressing

## 2023-11-01 LAB
GLUCOSE BLD MANUAL STRIP-MCNC: 110 MG/DL (ref 74–99)
GLUCOSE BLD MANUAL STRIP-MCNC: 153 MG/DL (ref 74–99)
GLUCOSE BLD MANUAL STRIP-MCNC: 161 MG/DL (ref 74–99)
GLUCOSE BLD MANUAL STRIP-MCNC: 189 MG/DL (ref 74–99)

## 2023-11-01 PROCEDURE — 1140000001 HC PRIVATE PSYCH ROOM DAILY

## 2023-11-01 PROCEDURE — 2500000001 HC RX 250 WO HCPCS SELF ADMINISTERED DRUGS (ALT 637 FOR MEDICARE OP): Performed by: INTERNAL MEDICINE

## 2023-11-01 PROCEDURE — 2500000001 HC RX 250 WO HCPCS SELF ADMINISTERED DRUGS (ALT 637 FOR MEDICARE OP): Performed by: PSYCHIATRY & NEUROLOGY

## 2023-11-01 PROCEDURE — 2500000004 HC RX 250 GENERAL PHARMACY W/ HCPCS (ALT 636 FOR OP/ED): Performed by: INTERNAL MEDICINE

## 2023-11-01 PROCEDURE — 99232 SBSQ HOSP IP/OBS MODERATE 35: CPT | Performed by: REGISTERED NURSE

## 2023-11-01 PROCEDURE — 82947 ASSAY GLUCOSE BLOOD QUANT: CPT

## 2023-11-01 PROCEDURE — 99232 SBSQ HOSP IP/OBS MODERATE 35: CPT | Performed by: INTERNAL MEDICINE

## 2023-11-01 RX ADMIN — INSULIN GLARGINE 40 UNITS: 100 INJECTION, SOLUTION SUBCUTANEOUS at 20:52

## 2023-11-01 RX ADMIN — ICOSAPENT ETHYL 1 G: 1 CAPSULE ORAL at 17:06

## 2023-11-01 RX ADMIN — ICOSAPENT ETHYL 1 G: 1 CAPSULE ORAL at 08:17

## 2023-11-01 RX ADMIN — CARVEDILOL 25 MG: 12.5 TABLET, FILM COATED ORAL at 08:17

## 2023-11-01 RX ADMIN — CARVEDILOL 25 MG: 12.5 TABLET, FILM COATED ORAL at 17:06

## 2023-11-01 RX ADMIN — ATORVASTATIN CALCIUM 40 MG: 40 TABLET, FILM COATED ORAL at 20:52

## 2023-11-01 RX ADMIN — Medication 400 MG: at 08:17

## 2023-11-01 RX ADMIN — PANTOPRAZOLE SODIUM 40 MG: 40 TABLET, DELAYED RELEASE ORAL at 06:14

## 2023-11-01 RX ADMIN — LATANOPROST 1 DROP: 50 SOLUTION OPHTHALMIC at 20:52

## 2023-11-01 RX ADMIN — VENLAFAXINE HYDROCHLORIDE 150 MG: 150 CAPSULE, EXTENDED RELEASE ORAL at 08:17

## 2023-11-01 RX ADMIN — LOSARTAN POTASSIUM 100 MG: 50 TABLET, FILM COATED ORAL at 08:17

## 2023-11-01 RX ADMIN — AMLODIPINE BESYLATE 10 MG: 10 TABLET ORAL at 08:17

## 2023-11-01 ASSESSMENT — COLUMBIA-SUICIDE SEVERITY RATING SCALE - C-SSRS
1. SINCE LAST CONTACT, HAVE YOU WISHED YOU WERE DEAD OR WISHED YOU COULD GO TO SLEEP AND NOT WAKE UP?: NO
2. HAVE YOU ACTUALLY HAD ANY THOUGHTS OF KILLING YOURSELF?: NO
1. SINCE LAST CONTACT, HAVE YOU WISHED YOU WERE DEAD OR WISHED YOU COULD GO TO SLEEP AND NOT WAKE UP?: NO
6. HAVE YOU EVER DONE ANYTHING, STARTED TO DO ANYTHING, OR PREPARED TO DO ANYTHING TO END YOUR LIFE?: NO
2. HAVE YOU ACTUALLY HAD ANY THOUGHTS OF KILLING YOURSELF?: NO
6. HAVE YOU EVER DONE ANYTHING, STARTED TO DO ANYTHING, OR PREPARED TO DO ANYTHING TO END YOUR LIFE?: NO

## 2023-11-01 ASSESSMENT — PAIN SCALES - GENERAL: PAINLEVEL_OUTOF10: 0 - NO PAIN

## 2023-11-01 NOTE — NURSING NOTE
BIRP NOTE     Problem:  Depression     Behavior:  Pt sitting in hallway near nurses station at start of shift. Calm, pleasant, smiling, behavior in control. Compliant with meds, cooperative with care.  Group Participation: n/a  Appetite/Meals: HS snack provided     Interventions:  Pt sat in group room for snack then went to room to prepare for sleep.    Response:  Calm, resting in bed at this time.     Plan:  Will continue to monitor behaviors and effectiveness of interventions while maintaining pt safety.

## 2023-11-01 NOTE — GROUP NOTE
Group Topic: Goals   Group Date: 11/1/2023  Start Time: 0730  End Time: 0800  Facilitators: Tammy Crooks   Department: [facility]    Number of Participants: 7   Group Focus: check in, communication, coping skills, daily focus, feeling awareness/expression, and self-awareness  Treatment Modality: Patient-Centered Therapy  Interventions utilized were exploration and support  Purpose: coping skills, feelings, communication skills, self-worth, self-care, and trigger / craving management    Name: Madalyn Ricci YOB: 1940   MR: [id number]      Facilitator: [facility]  Level of Participation: active  Quality of Participation: cooperative  Interactions with others: appropriate  Mood/Affect: appropriate and positive  Triggers (if applicable): n/a  Cognition: coherent/clear  Progress: Moderate  Comments: depressed mood  Plan: continue with services

## 2023-11-01 NOTE — PROGRESS NOTES
[facility]: [facility]  PROGRESS NOTE      Madalyn Ricci is a 83 y.o. female that is being seen  today for follow-up at [facility].  No chief complaint on file..  Subjective   Patient is being seen for follow-up of [facility].  Patient's blood pressure has been mildly elevated today, blood sugars have been stable.  No recent falls or episodes of agitation.  Patient is usually in the wheelchair since she has difficulty with walking.  Denies any other symptoms.    ROS  Negative for fever or chills  Negative for sore throat, ear pain, nasal discharge  Negative for cough, shortness of breath or wheezing  Negative for chest pain, palpitations, swelling of legs  Negative for abdominal pain, constipation, diarrhea, blood in the stools  Negative for urinary complaints  Negative for headache, dizziness, weakness or numbness  Negative for joint pain.  Positive for difficulty in walking  Positive for anxiety  All other systems reviewed and were negative   Vitals:    11/01/23 0615   BP: 162/72   Pulse: 62   Resp: 18   Temp: 36.6 °C (97.9 °F)   SpO2: 94%        Physical Exam  Constitutional: Patient does not appear to be in any acute distress  Head and Face: NCAT  Eyes: Normal external exam, EOMI  ENT: Normal external inspection of ears and nose. Oropharynx normal.  Cardiovascular: RRR, S1/S2, no murmurs, rubs, or gallops, radial pulses +2, no edema of extremities  Pulmonary: CTAB, no respiratory distress.  Abdomen: +BS, soft, non-tender, nondistended, no guarding or rebound, no masses noted  MSK: No joint swelling, normal movements of all extremities. Range of motion- normal.  Skin- No lesions, contusions, or erythema.  Peripheral puslses palpable bilaterally 2+  Neuro: AAO X3, Cranial nerves 2-12 grossly intact,DTR 2+ in all 4 limbs   Psychiatric: Judgment intact. Appropriate mood and behavior    Diagnostic Results   Lab Results   Component Value Date    GLUCOSE 199 (H) 10/16/2023    CALCIUM 9.3  10/16/2023     10/16/2023    K 3.7 10/16/2023    CO2 26 10/16/2023     (H) 10/16/2023    BUN 31 (H) 10/16/2023    CREATININE 1.20 10/16/2023     Lab Results   Component Value Date    ALT 11 10/16/2023    AST 16 10/16/2023    ALKPHOS 74 10/16/2023    BILITOT 0.3 10/16/2023     Lab Results   Component Value Date    WBC 6.4 10/16/2023    HGB 14.1 10/16/2023    HCT 44.1 10/16/2023    MCV 90 10/16/2023     10/16/2023     Lab Results   Component Value Date    CHOL 138 06/26/2020    CHOL 142 09/20/2019    CHOL 131 (L) 03/05/2019     Lab Results   Component Value Date    HDL 36 (L) 06/26/2020    HDL 36 (L) 09/20/2019    HDL 37 (L) 03/05/2019     Lab Results   Component Value Date    LDLCALC 44 (L) 06/26/2020    LDLCALC 36 (L) 09/20/2019    LDLCALC 49 (L) 03/05/2019     Lab Results   Component Value Date    TRIG 291 (H) 06/26/2020    TRIG 350 (H) 09/20/2019    TRIG 224 (H) 03/05/2019     Lab Results   Component Value Date    HGBA1C 6.4 (H) 12/12/2020     Other labs not included in the list above were reviewed either before or during this encounter.    History    Past Medical History:   Diagnosis Date    Diabetes mellitus (CMS/HCC)     Hypertension      Past Surgical History:   Procedure Laterality Date    APPENDECTOMY  10/13/2015    Appendectomy    CATARACT EXTRACTION  10/13/2015    Cataract Surgery    CHOLECYSTECTOMY  10/13/2015    Cholecystectomy    CT GUIDED IMAGING FOR NEEDLE PLACEMENT  2/4/2015    CT GUIDED IMAGING FOR NEEDLE PLACEMENT [facility]    HEMICOLECTOMY  10/13/2015    Hemicolectomy    HYSTERECTOMY  10/13/2015    Hysterectomy    LUNG LOBECTOMY  10/13/2015    Lung Lobectomy     No family history on file.  No Known Allergies  No current facility-administered medications on file prior to encounter.     Current Outpatient Medications on File Prior to Encounter   Medication Sig Dispense Refill    amLODIPine (Norvasc) 10 mg tablet Take 1 tablet (10 mg) by mouth once daily.       "atorvastatin (Lipitor) 40 mg tablet Take 1 tablet (40 mg) by mouth once daily.      carvedilol (Coreg) 25 mg tablet Take 1 tablet (25 mg) by mouth 2 times a day with meals.      [] cefdinir (Omnicef) 300 mg capsule Take 1 capsule (300 mg) by mouth 2 times a day for 7 days. 14 capsule 0    icosapent ethyL (Vascepa) 1 gram capsule Take 1 capsule (1 g) by mouth 2 times a day with meals.      insulin aspart (NovoLOG U-100 Insulin aspart) 100 unit/mL injection Inject 100 Units under the skin 3 times a day before meals. Take as directed per insulin instructions.      insulin degludec (Tresiba FlexTouch U-200) 200 unit/mL (3 mL) injection Inject 40 Units under the skin once daily at bedtime. Take as directed per insulin instructions.      latanoprost (Xalatan) 0.005 % ophthalmic solution Administer 1 drop into both eyes once daily at bedtime.      losartan (Cozaar) 50 mg tablet Take 2 tablets (100 mg) by mouth once daily.      magnesium oxide (Mag-Ox) 400 mg tablet Take 1 tablet (400 mg) by mouth once daily.      omeprazole (PriLOSEC) 40 mg DR capsule Take 1 capsule (40 mg) by mouth once daily in the morning. Take before meals. Do not crush or chew.      pen needle, diabetic (BD Traci 2nd Gen Pen Needle) 32 gauge x \" needle       potassium chloride ER (Micro-K) 10 mEq ER capsule Take 2 capsules (20 mEq) by mouth once daily. Do not crush or chew.      venlafaxine XR (Effexor-XR) 150 mg 24 hr capsule Take 1 capsule (150 mg) by mouth once daily. Do not crush or chew.      [DISCONTINUED] ferrous sulfate 325 (65 Fe) MG EC tablet Take 65 mg by mouth 3 times a day with meals. Do not crush, chew, or split.      [DISCONTINUED] fluticasone propion-salmeteroL (Advair) 115-21 mcg/actuation inhaler Inhale 2 puffs 2 times a day. Rinse mouth with water after use to reduce aftertaste and incidence of candidiasis. Do not swallow.      [DISCONTINUED] hydroCHLOROthiazide (HYDRODiuril) 50 mg tablet Take 1 tablet (50 mg) by mouth " once daily.      [DISCONTINUED] vitamin E 450 mg (1000 unit) capsule Take 100 Units by mouth once daily.         There is no immunization history on file for this patient.  Patient's medical history was reviewed and updated either before or during this encounter.  ASSESSMENT / PLAN:  Active Hospital Problems    Hyperlipidemia      GERD (gastroesophageal reflux disease)      Type 2 diabetes mellitus without complication, with long-term current use of insulin (CMS/[facility])      Primary hypertension      *Mood disorder (CMS/[facility])    Patient's blood pressure is elevated.  We will monitor.  Blood sugars have been fairly controlled.  Denies any significant complaints.  Patient is on statins.  Patient is being seen by geropsych team.        Osiel Forman MD

## 2023-11-01 NOTE — GROUP NOTE
Group Topic: Other   Group Date: 11/1/2023  Start Time: 1100  End Time: 1140  Facilitators: JENNIFER Gramajo   Department: [facility]IRTUAL    Number of Participants: 5   Group Focus: other Halloween memories  Treatment Modality: Other: Recreation Therapy  Interventions utilized were mental fitness, reminiscence, and story telling  Purpose: feelings and other: increase attention, enhance self esteem, express feelings, stimulate memory, elevate mood, increase socialization.    Name: Madalyn Ricci YOB: 1940   MR: [id number]      Facilitator: Recreational Therapist  Level of Participation: active  Quality of Participation: appropriate/pleasant, attentive, cooperative, and engaged  Interactions with others: appropriate  Mood/Affect: appropriate, brightens with interaction, and positive  Triggers (if applicable): n/a  Cognition: coherent/clear  Progress: Moderate  Comments: pt problem is depressed mood.  Plan: continue with services

## 2023-11-01 NOTE — GROUP NOTE
Group Topic: Other   Group Date: 11/1/2023  Start Time: 1515  End Time: 1615  Facilitators: JENNIFER Gramajo   Department: [facility] REH[facility] VIRTUAL    Number of Participants: 8   Group Focus: reminiscence  Treatment Modality: Other: Recreation Therapy  Interventions utilized were exploration, reminiscence, and story telling  Purpose: feelings and other: increase socialization, stimulate memory, elevate mood, express feelings, increase stimulation    Name: Madalyn Ricci YOB: 1940   MR: [id number]      Facilitator: Recreational Therapist  Level of Participation: active  Quality of Participation: appropriate/pleasant, attentive, and cooperative  Interactions with others: appropriate  Mood/Affect: appropriate  Triggers (if applicable): n/a  Cognition: coherent/clear  Progress: Moderate  Comments: pt problem is depressed mood.  Plan: continue with services

## 2023-11-01 NOTE — PROGRESS NOTES
Madalyn Ricci is a 83 y.o. female on day 15 of admission presenting with Mood disorder (CMS/HCC).      Patient is an 82yo female admitted for Mood Disorder.    Case discussed in morning team.    Vitals signs reviewed  Patient case was discussed in morning team.  She slept 8hs appetite is good, mood is good(clarified that nursing reported mild depression but she denies- says she is feeling well). Taking meds as ordered. No prns required. She is attending groups.  Interviewed in common area today. Social work is looking into discharge plan. [facility] was accepted and social work is attempting to find SNF placement. Iain is reviewingIn November she will get her social security check and will hopefully be able to pay for assisted living if accomodations are not found before then.    Past Medical History  She has a past medical history of Diabetes mellitus (CMS/HCC) and Hypertension.    Past Psychiatric History:   Previous therapy: no  Previous psychiatric treatment and medication trials: no  Previous psychiatric hospitalizations: no  Previous diagnoses: no  Previous suicide attempts: no  History of violence: no  Currently in treatment with N/A.  Depression screening was performed with standardized tool: Yes, Depression    Surgical History  She has a past surgical history that includes Lung lobectomy (10/13/2015); Hysterectomy (10/13/2015); Cholecystectomy (10/13/2015); Appendectomy (10/13/2015); Hemicolectomy (10/13/2015); Cataract extraction (10/13/2015); and CT guided imaging for needle placement (2/4/2015).     Social History  She reports that she has never smoked. She has never used smokeless tobacco. No history on file for alcohol use and drug use.     Allergies  Patient has no known allergies.    Review of Systems    Psychiatric ROS - Adult  Anxiety: Mild  Depression: negative  Delirium: negative  Psychosis: negative  Margret: negative  Safety Issues: none  Psychiatric ROS Comment: The patient states that she is  "not suicidal, she did  and denies AVH. The patient states that she is not depressed and the only concern now is a caring and safe environment for her to live on.    Physical Exam      Mental Status Exam  General: NAD  Appearance: Hospital attire  Attitude: Accepting  Behavior: cooperative  Motor Activity: using walker  Speech: normal rate and volume  Mood: improving  Affect: more open  Thought Process: more organized  Thought Content: no delusions elicited, denies SI/HI  Thought Perception: denies AH/VH/paranoia  Cognition: fair  Insight: fair  Judgement: fair    Psychiatric Risk Assessment  Violence Risk Assessment: none  Acute Risk of Harm to Others is Considered: low   Suicide Risk Assessment: age > 65 yrs old, , chronic medical illness, history of trauma or abuse, and living alone or lack of social support  Protective Factors against Suicide: fear of suicide and Confucianism affiliation/spirituality  Acute Risk of Harm to Self is Considered: low    Last Recorded Vitals  Blood pressure (!) 128/93, pulse 84, temperature 36.1 °C (97 °F), temperature source Temporal, resp. rate 17, height 1.626 m (5' 4.02\"), weight 127 kg (278 lb 15.9 oz), SpO2 99 %.    Relevant Results      Current Facility-Administered Medications:     acetaminophen (Tylenol) tablet 650 mg, 650 mg, oral, q4h PRN, Navin Brooke DO, 650 mg at 10/29/23 0755    alum-mag hydroxide-simeth (Mylanta) 200-200-20 mg/5 mL oral suspension 30 mL, 30 mL, oral, q6h PRN, Navin Brooke DO    amLODIPine (Norvasc) tablet 10 mg, 10 mg, oral, Daily, Osiel Forman MD, 10 mg at 11/01/23 0817    atorvastatin (Lipitor) tablet 40 mg, 40 mg, oral, Nightly, Osiel Forman MD, 40 mg at 10/31/23 2014    carvedilol (Coreg) tablet 25 mg, 25 mg, oral, BID with meals, Osiel Forman MD, 25 mg at 11/01/23 0817    dextrose 10 % in water (D10W) infusion, 0.3 g/kg/hr, intravenous, Once PRN, Osiel Forman MD    dextrose 50 % injection 25 g, 25 g, " intravenous, q15 min PRN, Osiel Forman MD    diphenhydrAMINE (BENADryl) capsule 50 mg, 50 mg, oral, q6h PRN **OR** diphenhydrAMINE (BENADryl) injection 50 mg, 50 mg, intramuscular, Once PRN, Navin Brooke DO    glucagon (Glucagen) injection 1 mg, 1 mg, intramuscular, q15 min PRN, Osiel Forman MD    icosapent ethyL (Vascepa) capsule 1 g, 1 g, oral, BID with meals, Osiel Forman MD, 1 g at 11/01/23 0817    insulin glargine (Lantus) injection 40 Units, 40 Units, subcutaneous, Nightly, Osiel Forman MD, 40 Units at 10/31/23 2014    latanoprost (Xalatan) 0.005 % ophthalmic solution 1 drop, 1 drop, Both Eyes, Nightly, Osiel Forman MD, 1 drop at 10/31/23 2013    losartan (Cozaar) tablet 100 mg, 100 mg, oral, Daily, Osiel Forman MD, 100 mg at 11/01/23 0817    magnesium hydroxide (Milk of Magnesia) 400 mg/5 mL suspension 30 mL, 30 mL, oral, Daily PRN, Navin Brooke DO    magnesium oxide (Mag-Ox) tablet 400 mg, 400 mg, oral, Daily, Osiel Forman MD, 400 mg at 11/01/23 0817    OLANZapine (ZyPREXA) tablet 5 mg, 5 mg, oral, q6h PRN **OR** OLANZapine (ZyPREXA) injection 5 mg, 5 mg, intramuscular, q6h PRN, Navin Brooke DO    pantoprazole (ProtoNix) EC tablet 40 mg, 40 mg, oral, Daily before breakfast, Osiel Forman MD, 40 mg at 11/01/23 0614    traZODone (Desyrel) tablet 25 mg, 25 mg, oral, Nightly PRN, Navin Brooke DO    venlafaxine XR (Effexor-XR) 24 hr capsule 150 mg, 150 mg, oral, Daily, Navin Brooke DO, 150 mg at 11/01/23 0817      Assessment/Plan   Principal Problem:    Mood disorder (CMS/[facility])  Active Problems:    Hyperlipidemia    GERD (gastroesophageal reflux disease)    Type 2 diabetes mellitus without complication, with long-term current use of insulin (CMS/[facility])    Primary hypertension    Continue meds as ordered.   Continue daily rounds.   Continue to encourage scheduled medication.  Encourage participation in group therapy.  Appreciate  social work arranging placement in assisted living   Provider did peer review with [facility] which was denied.         Medication Consent  Medication Consent: risks, benefits, side effects reviewed for all ordered medications    I spent 30  minutes in the professional and overall care of this patient.        Madalyn Ricci is a 83 y.o. female on day 15 of admission presenting with Mood disorder (CMS/[facility]).Madalyn Ricci is a 83 y.o. female on day 15 of admission presenting with Mood disorder (CMS/[facility]).Madalyn Ricci is a 83 y.o. female on day 15 of admission presenting with Mood disorder (CMS/[facility]).      Patient is an 84yo female admitted for Mood Disorder.    Case discussed in morning team.    Vitals signs reviewed  Patient case was discussed in morning team.  She slept 8hs appetite is good, mood is good. Taking meds as ordered. No prns required. She is attending groups.  She is participating in PT/OT.  Social work is looking into discharge plan. [facility] was accepted and social work is attempting to find SNF placement. Iain is reviewingIn November she will get her social security check and will hopefully be able to pay for assisted living if accomodations are not found before then. Social work is talking to other daughter to see if patient could discharge there while awaiting her checks.    Past Medical History  She has a past medical history of Diabetes mellitus (CMS/[facility]) and Hypertension.    Past Psychiatric History:   Previous therapy: no  Previous psychiatric treatment and medication trials: no  Previous psychiatric hospitalizations: no  Previous diagnoses: no  Previous suicide attempts: no  History of violence: no  Currently in treatment with N/A.  Depression screening was performed with standardized tool: Yes, Depression    Surgical History  She has a past surgical history that includes Lung lobectomy (10/13/2015); Hysterectomy (10/13/2015); Cholecystectomy (10/13/2015); Appendectomy (10/13/2015);  "Hemicolectomy (10/13/2015); Cataract extraction (10/13/2015); and CT guided imaging for needle placement (2/4/2015).     Social History  She reports that she has never smoked. She has never used smokeless tobacco. No history on file for alcohol use and drug use.     Allergies  Patient has no known allergies.    Review of Systems    Psychiatric ROS - Adult  Anxiety: Mild  Depression: negative  Delirium: negative  Psychosis: negative  Margret: negative  Safety Issues: none  Psychiatric ROS Comment: The patient states that she is not suicidal,  and denies AVH. The patient states that she is not depressed and the only concern now is a caring and safe environment for her to live in.    Physical Exam      Mental Status Exam  General: NAD  Appearance: Hospital attire  Attitude: pleasant  Behavior: cooperative  Motor Activity: using walker  Speech: normal rate and volume  Mood: ok  Affect: more open  Thought Process: more organized  Thought Content: no delusions elicited, denies SI/HI  Thought Perception: denies AH/VH/paranoia  Cognition: fair  Insight: fair  Judgement: fair    Psychiatric Risk Assessment  Violence Risk Assessment: none  Acute Risk of Harm to Others is Considered: low   Suicide Risk Assessment: age > 65 yrs old, , chronic medical illness, history of trauma or abuse, and living alone or lack of social support  Protective Factors against Suicide: fear of suicide and Muslim affiliation/spirituality  Acute Risk of Harm to Self is Considered: low    Last Recorded Vitals  Blood pressure (!) 128/93, pulse 84, temperature 36.1 °C (97 °F), temperature source Temporal, resp. rate 17, height 1.626 m (5' 4.02\"), weight 127 kg (278 lb 15.9 oz), SpO2 99 %.    Relevant Results      Current Facility-Administered Medications:     acetaminophen (Tylenol) tablet 650 mg, 650 mg, oral, q4h PRN, Navin Brooke DO, 650 mg at 10/29/23 0755    alum-mag hydroxide-simeth (Mylanta) 200-200-20 mg/5 mL oral suspension 30 " mL, 30 mL, oral, q6h PRN, Navin Brooke DO    amLODIPine (Norvasc) tablet 10 mg, 10 mg, oral, Daily, Osiel Forman MD, 10 mg at 11/01/23 0817    atorvastatin (Lipitor) tablet 40 mg, 40 mg, oral, Nightly, Osiel Forman MD, 40 mg at 10/31/23 2014    carvedilol (Coreg) tablet 25 mg, 25 mg, oral, BID with meals, Osiel Forman MD, 25 mg at 11/01/23 0817    dextrose 10 % in water (D10W) infusion, 0.3 g/kg/hr, intravenous, Once PRN, Osiel Forman MD    dextrose 50 % injection 25 g, 25 g, intravenous, q15 min PRN, Osiel Forman MD    diphenhydrAMINE (BENADryl) capsule 50 mg, 50 mg, oral, q6h PRN **OR** diphenhydrAMINE (BENADryl) injection 50 mg, 50 mg, intramuscular, Once PRN, Navin Brooke DO    glucagon (Glucagen) injection 1 mg, 1 mg, intramuscular, q15 min PRN, Osiel Forman MD    icosapent ethyL (Vascepa) capsule 1 g, 1 g, oral, BID with meals, Osiel Forman MD, 1 g at 11/01/23 0817    insulin glargine (Lantus) injection 40 Units, 40 Units, subcutaneous, Nightly, Osiel Forman MD, 40 Units at 10/31/23 2014    latanoprost (Xalatan) 0.005 % ophthalmic solution 1 drop, 1 drop, Both Eyes, Nightly, Osiel Forman MD, 1 drop at 10/31/23 2013    losartan (Cozaar) tablet 100 mg, 100 mg, oral, Daily, Osiel Forman MD, 100 mg at 11/01/23 0817    magnesium hydroxide (Milk of Magnesia) 400 mg/5 mL suspension 30 mL, 30 mL, oral, Daily PRN, Navin Brooke DO    magnesium oxide (Mag-Ox) tablet 400 mg, 400 mg, oral, Daily, Osiel Forman MD, 400 mg at 11/01/23 0817    OLANZapine (ZyPREXA) tablet 5 mg, 5 mg, oral, q6h PRN **OR** OLANZapine (ZyPREXA) injection 5 mg, 5 mg, intramuscular, q6h PRN, Navin Brooke DO    pantoprazole (ProtoNix) EC tablet 40 mg, 40 mg, oral, Daily before breakfast, Osiel Forman MD, 40 mg at 11/01/23 0614    traZODone (Desyrel) tablet 25 mg, 25 mg, oral, Nightly PRN, Navin Brooke DO    venlafaxine XR (Effexor-XR) 24  hr capsule 150 mg, 150 mg, oral, Daily, Navin Brooke, , 150 mg at 11/01/23 0817      Assessment/Plan   Principal Problem:    Mood disorder (CMS/HCC)  Active Problems:    Hyperlipidemia    GERD (gastroesophageal reflux disease)    Type 2 diabetes mellitus without complication, with long-term current use of insulin (CMS/HCC)    Primary hypertension    Continue meds as ordered  Continue daily rounds.   Continue to encourage scheduled medication.  Encourage participation in group therapy- goes to every group.  Appreciate social work arranging placement in assisted living   Provider is attempting to set up peer review with [facility] for placement.         Medication Consent  Medication Consent: risks, benefits, side effects reviewed for all ordered medications    I spent 30  minutes in the professional and overall care of this patient.        Madalyn Ricci is a 83 y.o. female on day 15 of admission presenting with Mood disorder (CMS/HCC).Madalyn Ricci is a 83 y.o. female on day 15 of admission presenting with Mood disorder (CMS/HCC).Madalyn Ricci is a 83 y.o. female on day 15 of admission presenting with Mood disorder (CMS/HCC).Madalyn Ricci is a 83 y.o. female on day 15 of admission presenting with Mood disorder (CMS/HCC).Madalyn Ricci is a 83 y.o. female on day 15 of admission presenting with Mood disorder (CMS/HCC).Madalyn Ricci is a 83 y.o. female on day 15 of admission presenting with Mood disorder (CMS/HCC).

## 2023-11-01 NOTE — PROGRESS NOTES
Social Work Note    ALIXS met with pt 1:1 to discuss pt's discharge plan and her disposition. FREDERIC-S dicussed with pt to see if she would be able to stay with her daughter for a night while she is able to set up a bank account then once that is completed she would follow up with the assisted living facility that has been willing to accept her. Pt is agreeable to this plan and will reach out to supports regarding the viability of this plan. KAYCEE will call granddaughter on Friday to determine if the check has been received.

## 2023-11-01 NOTE — GROUP NOTE
Group Topic: Music Therapy   Group Date: 11/1/2023  Start Time: 1000  End Time: 1100  Facilitators: ROOSEVELT Kelley   Department: [facility] EXPRESSIVE THER VIRTUAL    Number of Participants: 5   Group Focus: music therapy  Treatment Modality: Music Therapy  Interventions Utilized were: active music engagement, empathic listening/validating emotions, group exercise, mental fitness, passive music engagement, sharing/discussion, and support      Name: Madalyn Ricci YOB: 1940   MR: [id number]      Level of Participation: active  Quality of Participation: appropriate/pleasant, attentive, engaged, offered feedback, and supportive  Interactions with others: appropriate and supportive  Mood/Affect: appropriate, brightens with interaction, and positive  Cognition, Pre Treatment: attentive  Cognition, Post Treatment: attentive and goal directed  Progress: Moderate  Plan: continue with services

## 2023-11-01 NOTE — CARE PLAN
The patient's goals for the shift include go to group    The clinical goals for the shift include No falls    Over the shift, the patient did make progress toward the following goals. Barriers to progression include few, she's quite aware and on board w/ her Tx. plan. Recommendations to address these barriers include positive reinforcement and proactive participation in working towards discharge plan.      Problem: Fall/Injury  Goal: Verbalize understanding of personal risk factors for fall in the hospital  Outcome: Progressing     Problem: Fall/Injury  Goal: Pace activities to prevent fatigue by end of the shift  Outcome: Progressing

## 2023-11-02 LAB
GLUCOSE BLD MANUAL STRIP-MCNC: 131 MG/DL (ref 74–99)
GLUCOSE BLD MANUAL STRIP-MCNC: 209 MG/DL (ref 74–99)
GLUCOSE BLD MANUAL STRIP-MCNC: 94 MG/DL (ref 74–99)

## 2023-11-02 PROCEDURE — 2500000001 HC RX 250 WO HCPCS SELF ADMINISTERED DRUGS (ALT 637 FOR MEDICARE OP): Performed by: PSYCHIATRY & NEUROLOGY

## 2023-11-02 PROCEDURE — 82947 ASSAY GLUCOSE BLOOD QUANT: CPT

## 2023-11-02 PROCEDURE — 99232 SBSQ HOSP IP/OBS MODERATE 35: CPT | Performed by: REGISTERED NURSE

## 2023-11-02 PROCEDURE — 97112 NEUROMUSCULAR REEDUCATION: CPT | Mod: GP,CQ

## 2023-11-02 PROCEDURE — 2500000001 HC RX 250 WO HCPCS SELF ADMINISTERED DRUGS (ALT 637 FOR MEDICARE OP): Performed by: INTERNAL MEDICINE

## 2023-11-02 PROCEDURE — 1140000001 HC PRIVATE PSYCH ROOM DAILY

## 2023-11-02 PROCEDURE — 97116 GAIT TRAINING THERAPY: CPT | Mod: GP,CQ

## 2023-11-02 PROCEDURE — 2500000004 HC RX 250 GENERAL PHARMACY W/ HCPCS (ALT 636 FOR OP/ED): Performed by: INTERNAL MEDICINE

## 2023-11-02 RX ADMIN — ICOSAPENT ETHYL 1 G: 1 CAPSULE ORAL at 16:55

## 2023-11-02 RX ADMIN — Medication 400 MG: at 08:11

## 2023-11-02 RX ADMIN — VENLAFAXINE HYDROCHLORIDE 150 MG: 150 CAPSULE, EXTENDED RELEASE ORAL at 08:11

## 2023-11-02 RX ADMIN — LOSARTAN POTASSIUM 100 MG: 50 TABLET, FILM COATED ORAL at 08:11

## 2023-11-02 RX ADMIN — CARVEDILOL 25 MG: 12.5 TABLET, FILM COATED ORAL at 08:00

## 2023-11-02 RX ADMIN — PANTOPRAZOLE SODIUM 40 MG: 40 TABLET, DELAYED RELEASE ORAL at 08:11

## 2023-11-02 RX ADMIN — INSULIN GLARGINE 40 UNITS: 100 INJECTION, SOLUTION SUBCUTANEOUS at 21:10

## 2023-11-02 RX ADMIN — AMLODIPINE BESYLATE 10 MG: 10 TABLET ORAL at 08:11

## 2023-11-02 RX ADMIN — ATORVASTATIN CALCIUM 40 MG: 40 TABLET, FILM COATED ORAL at 21:09

## 2023-11-02 RX ADMIN — LATANOPROST 1 DROP: 50 SOLUTION OPHTHALMIC at 21:09

## 2023-11-02 RX ADMIN — ICOSAPENT ETHYL 1 G: 1 CAPSULE ORAL at 08:11

## 2023-11-02 ASSESSMENT — COGNITIVE AND FUNCTIONAL STATUS - GENERAL
MOVING FROM LYING ON BACK TO SITTING ON SIDE OF FLAT BED WITH BEDRAILS: A LITTLE
TURNING FROM BACK TO SIDE WHILE IN FLAT BAD: A LITTLE
MOBILITY SCORE: 21
CLIMB 3 TO 5 STEPS WITH RAILING: A LITTLE

## 2023-11-02 ASSESSMENT — PAIN - FUNCTIONAL ASSESSMENT: PAIN_FUNCTIONAL_ASSESSMENT: 0-10

## 2023-11-02 ASSESSMENT — COLUMBIA-SUICIDE SEVERITY RATING SCALE - C-SSRS
6. HAVE YOU EVER DONE ANYTHING, STARTED TO DO ANYTHING, OR PREPARED TO DO ANYTHING TO END YOUR LIFE?: NO
6. HAVE YOU EVER DONE ANYTHING, STARTED TO DO ANYTHING, OR PREPARED TO DO ANYTHING TO END YOUR LIFE?: NO
2. HAVE YOU ACTUALLY HAD ANY THOUGHTS OF KILLING YOURSELF?: NO
1. SINCE LAST CONTACT, HAVE YOU WISHED YOU WERE DEAD OR WISHED YOU COULD GO TO SLEEP AND NOT WAKE UP?: NO
2. HAVE YOU ACTUALLY HAD ANY THOUGHTS OF KILLING YOURSELF?: NO
2. HAVE YOU ACTUALLY HAD ANY THOUGHTS OF KILLING YOURSELF?: NO
6. HAVE YOU EVER DONE ANYTHING, STARTED TO DO ANYTHING, OR PREPARED TO DO ANYTHING TO END YOUR LIFE?: NO

## 2023-11-02 ASSESSMENT — PAIN SCALES - GENERAL: PAINLEVEL_OUTOF10: 0 - NO PAIN

## 2023-11-02 NOTE — PROGRESS NOTES
Madalyn Ricci is a 83 y.o. female on day 16 of admission presenting with Mood disorder (CMS/HCC).      Patient is an 82yo female admitted for Mood Disorder.    Case discussed in morning team.    Vitals signs reviewed  Patient case was discussed in morning team.  She slept 8hs appetite is good, mood is good(clarified that nursing reported mild depression but she denies- says she is feeling well). Taking meds as ordered. No prns required. She is attending groups.  Interviewed in common area today. Social work is looking into discharge plan. [facility] was accepted and social work is attempting to find SNF placement. Iain is reviewingIn November she will get her social security check and will hopefully be able to pay for assisted living if accomodations are not found before then.    Past Medical History  She has a past medical history of Diabetes mellitus (CMS/HCC) and Hypertension.    Past Psychiatric History:   Previous therapy: no  Previous psychiatric treatment and medication trials: no  Previous psychiatric hospitalizations: no  Previous diagnoses: no  Previous suicide attempts: no  History of violence: no  Currently in treatment with N/A.  Depression screening was performed with standardized tool: Yes, Depression    Surgical History  She has a past surgical history that includes Lung lobectomy (10/13/2015); Hysterectomy (10/13/2015); Cholecystectomy (10/13/2015); Appendectomy (10/13/2015); Hemicolectomy (10/13/2015); Cataract extraction (10/13/2015); and CT guided imaging for needle placement (2/4/2015).     Social History  She reports that she has never smoked. She has never used smokeless tobacco. No history on file for alcohol use and drug use.     Allergies  Patient has no known allergies.    Review of Systems    Psychiatric ROS - Adult  Anxiety: Mild  Depression: negative  Delirium: negative  Psychosis: negative  Margret: negative  Safety Issues: none  Psychiatric ROS Comment: The patient states that she is  "not suicidal, she did  and denies AVH. The patient states that she is not depressed and the only concern now is a caring and safe environment for her to live on.    Physical Exam      Mental Status Exam  General: NAD  Appearance: Hospital attire  Attitude: Accepting  Behavior: cooperative  Motor Activity: using walker  Speech: normal rate and volume  Mood: improving  Affect: more open  Thought Process: more organized  Thought Content: no delusions elicited, denies SI/HI  Thought Perception: denies AH/VH/paranoia  Cognition: fair  Insight: fair  Judgement: fair    Psychiatric Risk Assessment  Violence Risk Assessment: none  Acute Risk of Harm to Others is Considered: low   Suicide Risk Assessment: age > 65 yrs old, , chronic medical illness, history of trauma or abuse, and living alone or lack of social support  Protective Factors against Suicide: fear of suicide and Confucianism affiliation/spirituality  Acute Risk of Harm to Self is Considered: low    Last Recorded Vitals  Blood pressure (!) 128/93, pulse 84, temperature 36.1 °C (97 °F), temperature source Temporal, resp. rate 17, height 1.626 m (5' 4.02\"), weight 127 kg (278 lb 15.9 oz), SpO2 99 %.    Relevant Results      Current Facility-Administered Medications:     acetaminophen (Tylenol) tablet 650 mg, 650 mg, oral, q4h PRN, Navin Brooke DO, 650 mg at 10/29/23 0755    alum-mag hydroxide-simeth (Mylanta) 200-200-20 mg/5 mL oral suspension 30 mL, 30 mL, oral, q6h PRN, Navin Brooke DO    amLODIPine (Norvasc) tablet 10 mg, 10 mg, oral, Daily, Osiel Forman MD, 10 mg at 11/02/23 0811    atorvastatin (Lipitor) tablet 40 mg, 40 mg, oral, Nightly, Osiel Forman MD, 40 mg at 11/01/23 2052    carvedilol (Coreg) tablet 25 mg, 25 mg, oral, BID with meals, Osiel Forman MD, 25 mg at 11/02/23 0800    dextrose 10 % in water (D10W) infusion, 0.3 g/kg/hr, intravenous, Once PRN, Osiel Forman MD    dextrose 50 % injection 25 g, 25 g, " intravenous, q15 min PRN, Osiel Forman MD    diphenhydrAMINE (BENADryl) capsule 50 mg, 50 mg, oral, q6h PRN **OR** diphenhydrAMINE (BENADryl) injection 50 mg, 50 mg, intramuscular, Once PRN, Navin Brooke DO    glucagon (Glucagen) injection 1 mg, 1 mg, intramuscular, q15 min PRN, Osiel Forman MD    icosapent ethyL (Vascepa) capsule 1 g, 1 g, oral, BID with meals, Osiel Forman MD, 1 g at 11/02/23 0811    insulin glargine (Lantus) injection 40 Units, 40 Units, subcutaneous, Nightly, Osiel Forman MD, 40 Units at 11/01/23 2052    latanoprost (Xalatan) 0.005 % ophthalmic solution 1 drop, 1 drop, Both Eyes, Nightly, Osiel Forman MD, 1 drop at 11/01/23 2052    losartan (Cozaar) tablet 100 mg, 100 mg, oral, Daily, Osiel Forman MD, 100 mg at 11/02/23 0811    magnesium hydroxide (Milk of Magnesia) 400 mg/5 mL suspension 30 mL, 30 mL, oral, Daily PRN, Navin Brooke DO    magnesium oxide (Mag-Ox) tablet 400 mg, 400 mg, oral, Daily, Osiel Forman MD, 400 mg at 11/02/23 0811    OLANZapine (ZyPREXA) tablet 5 mg, 5 mg, oral, q6h PRN **OR** OLANZapine (ZyPREXA) injection 5 mg, 5 mg, intramuscular, q6h PRN, Navin Brooke DO    pantoprazole (ProtoNix) EC tablet 40 mg, 40 mg, oral, Daily before breakfast, Osiel Forman MD, 40 mg at 11/02/23 0811    traZODone (Desyrel) tablet 25 mg, 25 mg, oral, Nightly PRN, Navin Brooke DO    venlafaxine XR (Effexor-XR) 24 hr capsule 150 mg, 150 mg, oral, Daily, Navin Brooke DO, 150 mg at 11/02/23 0811      Assessment/Plan   Principal Problem:    Mood disorder (CMS/[facility])  Active Problems:    Hyperlipidemia    GERD (gastroesophageal reflux disease)    Type 2 diabetes mellitus without complication, with long-term current use of insulin (CMS/[facility])    Primary hypertension    Continue meds as ordered.   Continue daily rounds.   Continue to encourage scheduled medication.  Encourage participation in group therapy.  Appreciate  social work arranging placement in assisted living   Provider did peer review with [facility] which was denied.         Medication Consent  Medication Consent: risks, benefits, side effects reviewed for all ordered medications    I spent 30  minutes in the professional and overall care of this patient.        Madalyn Ricci is a 83 y.o. female on day 16 of admission presenting with Mood disorder (CMS/[facility]).Madalyn Ricci is a 83 y.o. female on day 16 of admission presenting with Mood disorder (CMS/[facility]).Madalyn Ricci is a 83 y.o. female on day 16 of admission presenting with Mood disorder (CMS/[facility]).      Patient is an 82yo female admitted for Mood Disorder.    Case discussed in morning team.    Vitals signs reviewed  Patient case was discussed in morning team.  She slept 8hs appetite is good, mood is good. Taking meds as ordered. No prns required. She is attending groups.  She is participating in PT/OT.  Social work is looking into discharge plan. [facility] was accepted and social work is attempting to find SNF placement.  Other daughter says patient can come to her for a night after check comes.    Past Medical History  She has a past medical history of Diabetes mellitus (CMS/[facility]) and Hypertension.    Past Psychiatric History:   Previous therapy: no  Previous psychiatric treatment and medication trials: no  Previous psychiatric hospitalizations: no  Previous diagnoses: no  Previous suicide attempts: no  History of violence: no  Currently in treatment with N/A.  Depression screening was performed with standardized tool: Yes, Depression    Surgical History  She has a past surgical history that includes Lung lobectomy (10/13/2015); Hysterectomy (10/13/2015); Cholecystectomy (10/13/2015); Appendectomy (10/13/2015); Hemicolectomy (10/13/2015); Cataract extraction (10/13/2015); and CT guided imaging for needle placement (2/4/2015).     Social History  She reports that she has never smoked. She has never used smokeless tobacco.  "No history on file for alcohol use and drug use.     Allergies  Patient has no known allergies.    Review of Systems    Psychiatric ROS - Adult  Anxiety: Mild  Depression: negative  Delirium: negative  Psychosis: negative  Margret: negative  Safety Issues: none  Psychiatric ROS Comment: The patient states that she is not suicidal,  and denies AVH. The patient states that she is not depressed and the only concern now is a caring and safe environment for her to live in.    Physical Exam      Mental Status Exam  General: NAD  Appearance: Hospital attire  Attitude: pleasant  Behavior: cooperative  Motor Activity: using walker  Speech: normal rate and volume  Mood: ok  Affect: more open  Thought Process: more organized  Thought Content: no delusions elicited, denies SI/HI  Thought Perception: denies AH/VH/paranoia  Cognition: fair  Insight: fair  Judgement: fair    Psychiatric Risk Assessment  Violence Risk Assessment: none  Acute Risk of Harm to Others is Considered: low   Suicide Risk Assessment: age > 65 yrs old, , chronic medical illness, history of trauma or abuse, and living alone or lack of social support  Protective Factors against Suicide: fear of suicide and Sikh affiliation/spirituality  Acute Risk of Harm to Self is Considered: low    Last Recorded Vitals  Blood pressure (!) 128/93, pulse 84, temperature 36.1 °C (97 °F), temperature source Temporal, resp. rate 17, height 1.626 m (5' 4.02\"), weight 127 kg (278 lb 15.9 oz), SpO2 99 %.    Relevant Results      Current Facility-Administered Medications:     acetaminophen (Tylenol) tablet 650 mg, 650 mg, oral, q4h PRN, Navin Brooke DO, 650 mg at 10/29/23 0755    alum-mag hydroxide-simeth (Mylanta) 200-200-20 mg/5 mL oral suspension 30 mL, 30 mL, oral, q6h PRN, Navin Brooke DO    amLODIPine (Norvasc) tablet 10 mg, 10 mg, oral, Daily, Osiel Forman MD, 10 mg at 11/02/23 0811    atorvastatin (Lipitor) tablet 40 mg, 40 mg, oral, Nightly, " Osiel Forman MD, 40 mg at 11/01/23 2052    carvedilol (Coreg) tablet 25 mg, 25 mg, oral, BID with meals, Osiel Forman MD, 25 mg at 11/02/23 0800    dextrose 10 % in water (D10W) infusion, 0.3 g/kg/hr, intravenous, Once PRN, Osiel Forman MD    dextrose 50 % injection 25 g, 25 g, intravenous, q15 min PRN, Osiel Forman MD    diphenhydrAMINE (BENADryl) capsule 50 mg, 50 mg, oral, q6h PRN **OR** diphenhydrAMINE (BENADryl) injection 50 mg, 50 mg, intramuscular, Once PRN, Navin Brooke DO    glucagon (Glucagen) injection 1 mg, 1 mg, intramuscular, q15 min PRN, Osiel Forman MD    icosapent ethyL (Vascepa) capsule 1 g, 1 g, oral, BID with meals, Osiel Forman MD, 1 g at 11/02/23 0811    insulin glargine (Lantus) injection 40 Units, 40 Units, subcutaneous, Nightly, Osiel Forman MD, 40 Units at 11/01/23 2052    latanoprost (Xalatan) 0.005 % ophthalmic solution 1 drop, 1 drop, Both Eyes, Nightly, Osiel Forman MD, 1 drop at 11/01/23 2052    losartan (Cozaar) tablet 100 mg, 100 mg, oral, Daily, Osiel Forman MD, 100 mg at 11/02/23 0811    magnesium hydroxide (Milk of Magnesia) 400 mg/5 mL suspension 30 mL, 30 mL, oral, Daily PRN, Navin Brooke DO    magnesium oxide (Mag-Ox) tablet 400 mg, 400 mg, oral, Daily, Osiel Forman MD, 400 mg at 11/02/23 0811    OLANZapine (ZyPREXA) tablet 5 mg, 5 mg, oral, q6h PRN **OR** OLANZapine (ZyPREXA) injection 5 mg, 5 mg, intramuscular, q6h PRN, Navin Brooke DO    pantoprazole (ProtoNix) EC tablet 40 mg, 40 mg, oral, Daily before breakfast, Osiel Forman MD, 40 mg at 11/02/23 0811    traZODone (Desyrel) tablet 25 mg, 25 mg, oral, Nightly PRN, Navin Brooke DO    venlafaxine XR (Effexor-XR) 24 hr capsule 150 mg, 150 mg, oral, Daily, Navin Brooke DO, 150 mg at 11/02/23 0811      Assessment/Plan   Principal Problem:    Mood disorder (CMS/HCC)  Active Problems:    Hyperlipidemia    GERD  (gastroesophageal reflux disease)    Type 2 diabetes mellitus without complication, with long-term current use of insulin (CMS/HCC)    Primary hypertension    Continue meds as ordered  Continue daily rounds.   Continue to encourage scheduled medication.  Encourage participation in group therapy- goes to every group.  Appreciate social work arranging placement in assisted living - patient will be discharged to daughters home when check comes and will go from there to assisted living.           Medication Consent  Medication Consent: risks, benefits, side effects reviewed for all ordered medications    I spent 30  minutes in the professional and overall care of this patient.        Madalyn Ricci is a 83 y.o. female on day 16 of admission presenting with Mood disorder (CMS/HCC).Madalyn Ricci is a 83 y.o. female on day 16 of admission presenting with Mood disorder (CMS/HCC).Madalyn Ricci is a 83 y.o. female on day 16 of admission presenting with Mood disorder (CMS/HCC).Madalyn Ricci is a 83 y.o. female on day 16 of admission presenting with Mood disorder (CMS/HCC).Madalyn Ricci is a 83 y.o. female on day 16 of admission presenting with Mood disorder (CMS/HCC).Madalyn Ricci is a 83 y.o. female on day 16 of admission presenting with Mood disorder (CMS/HCC).  Madalyn Ricci is a 83 y.o. female on day 16 of admission presenting with Mood disorder (CMS/HCC).          I spent 25 minutes in the professional and overall care of this patient.      Apolonia Loving, EMILIANA-CNS

## 2023-11-02 NOTE — PROGRESS NOTES
Social Work Note    ALIXS discussed case up treatment team and provider is aware that pt's support system is willing to help pt get to a bank account once pt is discharge and we are able to confirm pt has the checks at her grand daughter's house. LISW-S will reach out to granddaughter tomorrow per pt stating the first check should be arriving tomorrow. Pt is cooperative and med compliant.

## 2023-11-02 NOTE — GROUP NOTE
Group Topic: Other   Group Date: 11/2/2023  Start Time: 1000  End Time: 1100  Facilitators: JENNIFER Martin   Department: [facility] REHABTH VIRTUAL    Number of Participants: 6   Group Focus: other self-disclosure  Treatment Modality: Other: Recreation Therapy  Interventions utilized were exploration and reminiscence  Purpose: feelings and other: social skills    Name: Madalyn Ricci YOB: 1940   MR: [id number]      Facilitator: Recreational Therapist  Level of Participation: moderate  Quality of Participation: appropriate/pleasant and cooperative  Interactions with others: appropriate  Mood/Affect: appropriate  Triggers (if applicable): n/a  Cognition: coherent/clear  Progress: Moderate  Comments: pt problem is depressed mood. Needs some encouragement with participation   Plan: continue with services

## 2023-11-02 NOTE — GROUP NOTE
Group Topic: Other   Group Date: 11/2/2023  Start Time: 1500  End Time: 1540  Facilitators: JENNIFER Martin   Department: [facility] REHABTH VIRTUAL    Number of Participants: 7   Group Focus: social skills, Mental Fitness   Treatment Modality: Other: Recreation Therapy ,   Interventions utilized were mental fitness  Purpose: other: Social Skills, Mental Fitness    Name: Madalyn Ricci YOB: 1940   MR: [id number]      Facilitator: Recreational Therapist  Level of Participation: active  Quality of Participation: appropriate/pleasant, cooperative, and engaged  Interactions with others: appropriate  Mood/Affect: appropriate  Triggers (if applicable): n/a  Cognition: coherent/clear  Progress: Moderate  Comments: pt problem is depressed mood  Plan: continue with services

## 2023-11-02 NOTE — CARE PLAN
Problem: Potential for Harm to Self or Others  Goal: Patient/Family participate in treatment and discharge plans  Outcome: Met   The patient's goals for the shift include go to group    The clinical goals for the shift include No falls    Over the shift, the patient did not make progress toward the following goals.

## 2023-11-03 LAB — GLUCOSE BLD MANUAL STRIP-MCNC: 156 MG/DL (ref 74–99)

## 2023-11-03 PROCEDURE — 99232 SBSQ HOSP IP/OBS MODERATE 35: CPT | Performed by: REGISTERED NURSE

## 2023-11-03 PROCEDURE — 2500000001 HC RX 250 WO HCPCS SELF ADMINISTERED DRUGS (ALT 637 FOR MEDICARE OP): Performed by: INTERNAL MEDICINE

## 2023-11-03 PROCEDURE — 2500000001 HC RX 250 WO HCPCS SELF ADMINISTERED DRUGS (ALT 637 FOR MEDICARE OP): Performed by: PSYCHIATRY & NEUROLOGY

## 2023-11-03 PROCEDURE — 2500000004 HC RX 250 GENERAL PHARMACY W/ HCPCS (ALT 636 FOR OP/ED): Performed by: INTERNAL MEDICINE

## 2023-11-03 PROCEDURE — 99232 SBSQ HOSP IP/OBS MODERATE 35: CPT | Performed by: INTERNAL MEDICINE

## 2023-11-03 PROCEDURE — 1140000001 HC PRIVATE PSYCH ROOM DAILY

## 2023-11-03 PROCEDURE — 82947 ASSAY GLUCOSE BLOOD QUANT: CPT

## 2023-11-03 RX ADMIN — PANTOPRAZOLE SODIUM 40 MG: 40 TABLET, DELAYED RELEASE ORAL at 08:46

## 2023-11-03 RX ADMIN — LOSARTAN POTASSIUM 100 MG: 50 TABLET, FILM COATED ORAL at 08:46

## 2023-11-03 RX ADMIN — ICOSAPENT ETHYL 1 G: 1 CAPSULE ORAL at 08:46

## 2023-11-03 RX ADMIN — AMLODIPINE BESYLATE 10 MG: 10 TABLET ORAL at 08:46

## 2023-11-03 RX ADMIN — LATANOPROST 1 DROP: 50 SOLUTION OPHTHALMIC at 21:28

## 2023-11-03 RX ADMIN — Medication 400 MG: at 08:46

## 2023-11-03 RX ADMIN — CARVEDILOL 25 MG: 12.5 TABLET, FILM COATED ORAL at 17:49

## 2023-11-03 RX ADMIN — ICOSAPENT ETHYL 1 G: 1 CAPSULE ORAL at 17:49

## 2023-11-03 RX ADMIN — VENLAFAXINE HYDROCHLORIDE 150 MG: 150 CAPSULE, EXTENDED RELEASE ORAL at 08:46

## 2023-11-03 RX ADMIN — CARVEDILOL 25 MG: 12.5 TABLET, FILM COATED ORAL at 08:46

## 2023-11-03 RX ADMIN — INSULIN GLARGINE 40 UNITS: 100 INJECTION, SOLUTION SUBCUTANEOUS at 21:27

## 2023-11-03 RX ADMIN — ATORVASTATIN CALCIUM 40 MG: 40 TABLET, FILM COATED ORAL at 21:27

## 2023-11-03 ASSESSMENT — PAIN SCALES - GENERAL
PAINLEVEL_OUTOF10: 0 - NO PAIN
PAINLEVEL_OUTOF10: 0 - NO PAIN

## 2023-11-03 ASSESSMENT — COLUMBIA-SUICIDE SEVERITY RATING SCALE - C-SSRS
6. HAVE YOU EVER DONE ANYTHING, STARTED TO DO ANYTHING, OR PREPARED TO DO ANYTHING TO END YOUR LIFE?: NO
2. HAVE YOU ACTUALLY HAD ANY THOUGHTS OF KILLING YOURSELF?: NO
2. HAVE YOU ACTUALLY HAD ANY THOUGHTS OF KILLING YOURSELF?: NO
6. HAVE YOU EVER DONE ANYTHING, STARTED TO DO ANYTHING, OR PREPARED TO DO ANYTHING TO END YOUR LIFE?: NO
1. SINCE LAST CONTACT, HAVE YOU WISHED YOU WERE DEAD OR WISHED YOU COULD GO TO SLEEP AND NOT WAKE UP?: NO
1. SINCE LAST CONTACT, HAVE YOU WISHED YOU WERE DEAD OR WISHED YOU COULD GO TO SLEEP AND NOT WAKE UP?: NO

## 2023-11-03 ASSESSMENT — PAIN - FUNCTIONAL ASSESSMENT
PAIN_FUNCTIONAL_ASSESSMENT: 0-10
PAIN_FUNCTIONAL_ASSESSMENT: 0-10

## 2023-11-03 NOTE — GROUP NOTE
Group Topic: Music Therapy   Group Date: 11/3/2023  Start Time: 1000  End Time: 1100  Facilitators: ROOSEVELT Kelley   Department: [facility] EXPRESSIVE THER VIRTUAL    Number of Participants: 7   Group Focus: music therapy  Treatment Modality: Music Therapy  Interventions Utilized were: active music engagement, education/instruction, empathic listening/validating emotions, sharing/discussion, and support      Name: Madalyn Ricci YOB: 1940   MR: [id number]      Level of Participation: active  Quality of Participation: appropriate/pleasant, attentive, and engaged  Interactions with others: appropriate and supportive  Mood/Affect: appropriate and bright  Cognition, Pre Treatment: attentive  Cognition, Post Treatment: attentive and goal directed  Progress: Moderate  Plan: continue with services

## 2023-11-03 NOTE — NURSING NOTE
Assumed care of patient. Patient participating in group with recreational therapist. Denies any pain or needs at this time. Will continue to monitor.

## 2023-11-03 NOTE — PROGRESS NOTES
ALIXS called granddaughter and left a VM inquiring about pts check arriving in the mail. FREDERIC-S left contact information for andreas to return phone calls.     FREDERIC-S called APS worker to update her on progress towards discharge. APS worker  requesting to be involved regarding information related to her discharge. ALIXS will call APS worker once a plan is in place.     ALIXS reached out to [facility] to inquire about a fax number to be able to send clinical over to [facility] d/t not being able to leave VM when reached out too. ALIXS will fax over referral.

## 2023-11-03 NOTE — GROUP NOTE
Group Topic: Other   Group Date: 11/3/2023  Start Time: 1500  End Time: 1600  Facilitators: JENNIFER Martin   Department: [facility] REHABTH VIRTUAL    Number of Participants: 5   Group Focus: feeling awareness/expression, goals, reminiscence, self-awareness, and social skills  Treatment Modality: Other: Recreation Therapy  Interventions utilized were group exercise and reminiscence, story telling, exploration   Purpose: coping skills and other: increase socialization, increase mental fitness, Increase ROM, Increase focus     Name: Madalyn Ricci YOB: 1940   MR: [id number]      Facilitator: Recreational Therapist  Level of Participation: active  Quality of Participation: appropriate/pleasant, cooperative, and engaged  Interactions with others: appropriate  Mood/Affect: appropriate and bright  Triggers (if applicable): n/a  Cognition: coherent/clear  Progress: Moderate  Comments: pt problem is depressed. Pt able to identify reading and her bible versus as examples to reduce stress  Plan: continue with services

## 2023-11-03 NOTE — NURSING NOTE
WISAM NOTE     Problem:  Depression     Behavior:  Patient is sitting sitting in the group room watching television. Patient is pleasant and calm. Patient's affect is blunted. Patient is talkative. Patient maintains good eye contact.    Group Participation: N/A  Appetite/Meals: N/A       Interventions:  This nurse completed a shift assessment and administered patient's scheduled night time medications.    Response:  Patient remained pleasant and calm and was cooperative throughout this shift assessment and medication administration.     Plan:  Continue to monitor for depression. Continue to monitor for patient safety.

## 2023-11-03 NOTE — GROUP NOTE
Group Topic: Reminiscence   Group Date: 11/3/2023  Start Time: 1100  End Time: 1130  Facilitators: JENNIFER Martin   Department: [facility] REHABTH VIRTUAL    Number of Participants: 5   Group Focus: reminiscence  Treatment Modality: Other: Recreation Therapy  Interventions utilized were reminiscence  Purpose: feelings, Increase socialization, stimulate memory     Name: Madalyn Ricci YOB: 1940   MR: [id number]      Facilitator: Recreational Therapist  Level of Participation: active  Quality of Participation: appropriate/pleasant and cooperative  Interactions with others: appropriate  Mood/Affect: appropriate  Triggers (if applicable): n/a   Cognition: coherent/clear  Progress: Moderate  Comments: pt problem is depressed mood. Pt needs some prompting to engage in conversation. Affect is brighter with interactions.   Plan: continue with services

## 2023-11-03 NOTE — PROGRESS NOTES
" REHAB Therapy Assessment & Treatment    Patient Name: Madalyn Ricci  MRN: [id number]  Today's Date: 11/3/2023    Recreation Therapy Note: Pt alert and oriented x3 with some poor insight/judgement. Pt attends daily groups of choice and is pleasant and calm with behaviors and mood. Pt reports be \"fearful of being homeless\" and often talks about relationship with daughter. Pt engages easily with staff and peers. Pt is eating and sleeping well. Will continue to encourage daily groups of choice.   "

## 2023-11-03 NOTE — PROGRESS NOTES
Madalyn Ricci is a 83 y.o. female on day 17 of admission presenting with Mood disorder (CMS/HCC).Madalyn Ricci is a 83 y.o. female on day 17 of admission presenting with Mood disorder (CMS/HCC).Madalyn Ricci is a 83 y.o. female on day 17 of admission presenting with Mood disorder (CMS/HCC).      Patient is an 82yo female admitted for Mood Disorder.    Case discussed in morning team.    Vitals signs reviewed  Patient case was discussed in morning team.  She slept 8hs appetite is good, mood is good. Taking meds as ordered. No prns required. She is attending groups.  She is participating in PT/OT.  Social work is looking into discharge plan. [facility] was accepted and social work is attempting to find SNF placement.  Other daughter says patient can come to her for a night after check comes. Patient is anticipating discharge after check arrives.    Past Medical History  She has a past medical history of Diabetes mellitus (CMS/formerly [facility]) and Hypertension.    Past Psychiatric History:   Previous therapy: no  Previous psychiatric treatment and medication trials: no  Previous psychiatric hospitalizations: no  Previous diagnoses: no  Previous suicide attempts: no  History of violence: no  Currently in treatment with N/A.  Depression screening was performed with standardized tool: Yes, Depression    Surgical History  She has a past surgical history that includes Lung lobectomy (10/13/2015); Hysterectomy (10/13/2015); Cholecystectomy (10/13/2015); Appendectomy (10/13/2015); Hemicolectomy (10/13/2015); Cataract extraction (10/13/2015); and CT guided imaging for needle placement (2/4/2015).     Social History  She reports that she has never smoked. She has never used smokeless tobacco. No history on file for alcohol use and drug use.     Allergies  Patient has no known allergies.    Review of Systems    Psychiatric ROS - Adult  Anxiety: Mild  Depression: negative  Delirium: negative  Psychosis: negative  Margret:  "negative  Safety Issues: none  Psychiatric ROS Comment: The patient states that she is not suicidal,  and denies AVH. The patient states that she is not depressed and the only concern now is a caring and safe environment for her to live in.    Physical Exam      Mental Status Exam  General: NAD  Appearance: Hospital attire  Attitude: pleasant  Behavior: cooperative  Motor Activity: using walker  Speech: normal rate and volume  Mood: ok  Affect: more open  Thought Process: more organized  Thought Content: no delusions elicited, denies SI/HI  Thought Perception: denies AH/VH/paranoia  Cognition: fair  Insight: fair  Judgement: fair    Psychiatric Risk Assessment  Violence Risk Assessment: none  Acute Risk of Harm to Others is Considered: low   Suicide Risk Assessment: age > 65 yrs old, , chronic medical illness, history of trauma or abuse, and living alone or lack of social support  Protective Factors against Suicide: fear of suicide and Jewish affiliation/spirituality  Acute Risk of Harm to Self is Considered: low    Last Recorded Vitals  Blood pressure (!) 128/93, pulse 84, temperature 36.1 °C (97 °F), temperature source Temporal, resp. rate 17, height 1.626 m (5' 4.02\"), weight 127 kg (278 lb 15.9 oz), SpO2 99 %.    Relevant Results      Current Facility-Administered Medications:     acetaminophen (Tylenol) tablet 650 mg, 650 mg, oral, q4h PRN, Navin Brooke DO, 650 mg at 10/29/23 0755    alum-mag hydroxide-simeth (Mylanta) 200-200-20 mg/5 mL oral suspension 30 mL, 30 mL, oral, q6h PRN, Navin Brooke DO    amLODIPine (Norvasc) tablet 10 mg, 10 mg, oral, Daily, Osiel Forman MD, 10 mg at 11/03/23 0846    atorvastatin (Lipitor) tablet 40 mg, 40 mg, oral, Nightly, Osiel Forman MD, 40 mg at 11/02/23 2109    carvedilol (Coreg) tablet 25 mg, 25 mg, oral, BID with meals, Osiel Forman MD, 25 mg at 11/03/23 0846    dextrose 10 % in water (D10W) infusion, 0.3 g/kg/hr, intravenous, " Once PRN, Osiel Forman MD    dextrose 50 % injection 25 g, 25 g, intravenous, q15 min PRN, Osiel Forman MD    diphenhydrAMINE (BENADryl) capsule 50 mg, 50 mg, oral, q6h PRN **OR** diphenhydrAMINE (BENADryl) injection 50 mg, 50 mg, intramuscular, Once PRN, Navin Brooke DO    glucagon (Glucagen) injection 1 mg, 1 mg, intramuscular, q15 min PRN, Osiel Forman MD    icosapent ethyL (Vascepa) capsule 1 g, 1 g, oral, BID with meals, Osiel Forman MD, 1 g at 11/03/23 0846    insulin glargine (Lantus) injection 40 Units, 40 Units, subcutaneous, Nightly, Osiel Forman MD, 40 Units at 11/02/23 2110    latanoprost (Xalatan) 0.005 % ophthalmic solution 1 drop, 1 drop, Both Eyes, Nightly, Osiel Forman MD, 1 drop at 11/02/23 2109    losartan (Cozaar) tablet 100 mg, 100 mg, oral, Daily, Osiel Forman MD, 100 mg at 11/03/23 0846    magnesium hydroxide (Milk of Magnesia) 400 mg/5 mL suspension 30 mL, 30 mL, oral, Daily PRN, Navin Brooke DO    magnesium oxide (Mag-Ox) tablet 400 mg, 400 mg, oral, Daily, Osiel Forman MD, 400 mg at 11/03/23 0846    OLANZapine (ZyPREXA) tablet 5 mg, 5 mg, oral, q6h PRN **OR** OLANZapine (ZyPREXA) injection 5 mg, 5 mg, intramuscular, q6h PRN, Navin Brooke DO    pantoprazole (ProtoNix) EC tablet 40 mg, 40 mg, oral, Daily before breakfast, Osiel Forman MD, 40 mg at 11/03/23 0846    traZODone (Desyrel) tablet 25 mg, 25 mg, oral, Nightly PRN, Navin Brooke DO    venlafaxine XR (Effexor-XR) 24 hr capsule 150 mg, 150 mg, oral, Daily, Navin Brooke DO, 150 mg at 11/03/23 0846      Assessment/Plan   Principal Problem:    Mood disorder (CMS/[facility])  Active Problems:    Hyperlipidemia    GERD (gastroesophageal reflux disease)    Type 2 diabetes mellitus without complication, with long-term current use of insulin (CMS/[facility])    Primary hypertension    Continue meds as ordered  Continue daily rounds.   Continue to encourage scheduled  medication.  Encourage participation in group therapy- goes to every group.  Appreciate social work arranging placement in assisted living - patient will be discharged to daughters home when check comes and will go from there to assisted living.           Medication Consent  Medication Consent: risks, benefits, side effects reviewed for all ordered medications    I spent 30  minutes in the professional and overall care of this patient.        Madalyn Ricci is a 83 y.o. female on day 17 of admission presenting with Mood disorder (CMS/HCC).Madalyn Ricci is a 83 y.o. female on day 17 of admission presenting with Mood disorder (CMS/HCC).Madalyn Ricci is a 83 y.o. female on day 17 of admission presenting with Mood disorder (CMS/HCC).Madalyn Ricci is a 83 y.o. female on day 17 of admission presenting with Mood disorder (CMS/HCC).Madalyn Ricci is a 83 y.o. female on day 17 of admission presenting with Mood disorder (CMS/HCC).Madalyn Ricci is a 83 y.o. female on day 17 of admission presenting with Mood disorder (CMS/HCC).  Madalyn Ricci is a 83 y.o. female on day 17 of admission presenting with Mood disorder (CMS/HCC).          I spent 25 minutes in the professional and overall care of this patient.      EMILIANA Escalante-CNS  Madalyn Ricci is a 83 y.o. female on day 17 of admission presenting with Mood disorder (CMS/HCC).

## 2023-11-03 NOTE — PROGRESS NOTES
[facility]: [facility]  PROGRESS NOTE      Madalyn Ricci is a 83 y.o. female that is being seen  today for follow-up at [facility].  No chief complaint on file..  Subjective   Patient is being seen for follow-up of [facility].  Patient's blood pressure has been better today, blood sugars have been stable.  No recent falls or episodes of agitation.  Patient is usually in the wheelchair since she has difficulty with walking.  Denies any other symptoms.    ROS  Negative for fever or chills  Negative for sore throat, ear pain, nasal discharge  Negative for cough, shortness of breath or wheezing  Negative for chest pain, palpitations, swelling of legs  Negative for abdominal pain, constipation, diarrhea, blood in the stools  Negative for urinary complaints  Negative for headache, dizziness, weakness or numbness  Negative for joint pain.  Positive for difficulty in walking  Positive for anxiety  All other systems reviewed and were negative   Vitals:    11/03/23 0500   BP: 124/72   Pulse: 60   Resp: 18   Temp: 36.6 °C (97.9 °F)   SpO2: 99%      Physical Exam  Constitutional: Patient does not appear to be in any acute distress  Head and Face: NCAT  Eyes: Normal external exam, EOMI  ENT: Normal external inspection of ears and nose. Oropharynx normal.  Cardiovascular: RRR, S1/S2, no murmurs, rubs, or gallops, radial pulses +2, no edema of extremities  Pulmonary: CTAB, no respiratory distress.  Abdomen: +BS, soft, non-tender, nondistended, no guarding or rebound, no masses noted  MSK: No joint swelling, normal movements of all extremities. Range of motion- normal.  Skin- No lesions, contusions, or erythema.  Peripheral puslses palpable bilaterally 2+  Neuro: AAO X3, Cranial nerves 2-12 grossly intact,DTR 2+ in all 4 limbs   Psychiatric: Judgment intact. Appropriate mood and behavior    Diagnostic Results   Lab Results   Component Value Date    GLUCOSE 199 (H) 10/16/2023    CALCIUM 9.3 10/16/2023    NA  145 10/16/2023    K 3.7 10/16/2023    CO2 26 10/16/2023     (H) 10/16/2023    BUN 31 (H) 10/16/2023    CREATININE 1.20 10/16/2023     Lab Results   Component Value Date    ALT 11 10/16/2023    AST 16 10/16/2023    ALKPHOS 74 10/16/2023    BILITOT 0.3 10/16/2023     Lab Results   Component Value Date    WBC 6.4 10/16/2023    HGB 14.1 10/16/2023    HCT 44.1 10/16/2023    MCV 90 10/16/2023     10/16/2023     Lab Results   Component Value Date    CHOL 138 06/26/2020    CHOL 142 09/20/2019    CHOL 131 (L) 03/05/2019     Lab Results   Component Value Date    HDL 36 (L) 06/26/2020    HDL 36 (L) 09/20/2019    HDL 37 (L) 03/05/2019     Lab Results   Component Value Date    LDLCALC 44 (L) 06/26/2020    LDLCALC 36 (L) 09/20/2019    LDLCALC 49 (L) 03/05/2019     Lab Results   Component Value Date    TRIG 291 (H) 06/26/2020    TRIG 350 (H) 09/20/2019    TRIG 224 (H) 03/05/2019     Lab Results   Component Value Date    HGBA1C 6.4 (H) 12/12/2020     Other labs not included in the list above were reviewed either before or during this encounter.    History    Past Medical History:   Diagnosis Date    Diabetes mellitus (CMS/HCC)     Hypertension      Past Surgical History:   Procedure Laterality Date    APPENDECTOMY  10/13/2015    Appendectomy    CATARACT EXTRACTION  10/13/2015    Cataract Surgery    CHOLECYSTECTOMY  10/13/2015    Cholecystectomy    CT GUIDED IMAGING FOR NEEDLE PLACEMENT  2/4/2015    CT GUIDED IMAGING FOR NEEDLE PLACEMENT [facility]    HEMICOLECTOMY  10/13/2015    Hemicolectomy    HYSTERECTOMY  10/13/2015    Hysterectomy    LUNG LOBECTOMY  10/13/2015    Lung Lobectomy     No family history on file.  No Known Allergies  No current facility-administered medications on file prior to encounter.     Current Outpatient Medications on File Prior to Encounter   Medication Sig Dispense Refill    amLODIPine (Norvasc) 10 mg tablet Take 1 tablet (10 mg) by mouth once daily.      atorvastatin (Lipitor) 40 mg  "tablet Take 1 tablet (40 mg) by mouth once daily.      carvedilol (Coreg) 25 mg tablet Take 1 tablet (25 mg) by mouth 2 times a day with meals.      [] cefdinir (Omnicef) 300 mg capsule Take 1 capsule (300 mg) by mouth 2 times a day for 7 days. 14 capsule 0    icosapent ethyL (Vascepa) 1 gram capsule Take 1 capsule (1 g) by mouth 2 times a day with meals.      insulin aspart (NovoLOG U-100 Insulin aspart) 100 unit/mL injection Inject 100 Units under the skin 3 times a day before meals. Take as directed per insulin instructions.      insulin degludec (Tresiba FlexTouch U-200) 200 unit/mL (3 mL) injection Inject 40 Units under the skin once daily at bedtime. Take as directed per insulin instructions.      latanoprost (Xalatan) 0.005 % ophthalmic solution Administer 1 drop into both eyes once daily at bedtime.      losartan (Cozaar) 50 mg tablet Take 2 tablets (100 mg) by mouth once daily.      magnesium oxide (Mag-Ox) 400 mg tablet Take 1 tablet (400 mg) by mouth once daily.      omeprazole (PriLOSEC) 40 mg DR capsule Take 1 capsule (40 mg) by mouth once daily in the morning. Take before meals. Do not crush or chew.      pen needle, diabetic (BD Traci 2nd Gen Pen Needle) 32 gauge x \" needle       potassium chloride ER (Micro-K) 10 mEq ER capsule Take 2 capsules (20 mEq) by mouth once daily. Do not crush or chew.      venlafaxine XR (Effexor-XR) 150 mg 24 hr capsule Take 1 capsule (150 mg) by mouth once daily. Do not crush or chew.      [DISCONTINUED] ferrous sulfate 325 (65 Fe) MG EC tablet Take 65 mg by mouth 3 times a day with meals. Do not crush, chew, or split.      [DISCONTINUED] fluticasone propion-salmeteroL (Advair) 115-21 mcg/actuation inhaler Inhale 2 puffs 2 times a day. Rinse mouth with water after use to reduce aftertaste and incidence of candidiasis. Do not swallow.      [DISCONTINUED] hydroCHLOROthiazide (HYDRODiuril) 50 mg tablet Take 1 tablet (50 mg) by mouth once daily.      [DISCONTINUED] " vitamin E 450 mg (1000 unit) capsule Take 100 Units by mouth once daily.         There is no immunization history on file for this patient.  Patient's medical history was reviewed and updated either before or during this encounter.  ASSESSMENT / PLAN:  Active Hospital Problems    Hyperlipidemia      GERD (gastroesophageal reflux disease)      Type 2 diabetes mellitus without complication, with long-term current use of insulin (CMS/[facility])      Primary hypertension      *Mood disorder (CMS/[facility])    Patient's blood pressure is better .  We will monitor.  Blood sugars have been fairly controlled.  Denies any significant complaints.  Patient is on statins.  Patient is being seen by geropsych team.        Osiel Forman MD

## 2023-11-04 LAB — GLUCOSE BLD MANUAL STRIP-MCNC: 179 MG/DL (ref 74–99)

## 2023-11-04 PROCEDURE — 2500000001 HC RX 250 WO HCPCS SELF ADMINISTERED DRUGS (ALT 637 FOR MEDICARE OP): Performed by: INTERNAL MEDICINE

## 2023-11-04 PROCEDURE — 2500000001 HC RX 250 WO HCPCS SELF ADMINISTERED DRUGS (ALT 637 FOR MEDICARE OP): Performed by: PSYCHIATRY & NEUROLOGY

## 2023-11-04 PROCEDURE — 2500000004 HC RX 250 GENERAL PHARMACY W/ HCPCS (ALT 636 FOR OP/ED): Performed by: INTERNAL MEDICINE

## 2023-11-04 PROCEDURE — 97112 NEUROMUSCULAR REEDUCATION: CPT | Mod: GP

## 2023-11-04 PROCEDURE — 1140000001 HC PRIVATE PSYCH ROOM DAILY

## 2023-11-04 PROCEDURE — 82947 ASSAY GLUCOSE BLOOD QUANT: CPT

## 2023-11-04 PROCEDURE — 99231 SBSQ HOSP IP/OBS SF/LOW 25: CPT | Performed by: PSYCHIATRY & NEUROLOGY

## 2023-11-04 PROCEDURE — 97110 THERAPEUTIC EXERCISES: CPT | Mod: GP

## 2023-11-04 RX ADMIN — Medication 400 MG: at 08:24

## 2023-11-04 RX ADMIN — ATORVASTATIN CALCIUM 40 MG: 40 TABLET, FILM COATED ORAL at 21:11

## 2023-11-04 RX ADMIN — CARVEDILOL 25 MG: 12.5 TABLET, FILM COATED ORAL at 17:04

## 2023-11-04 RX ADMIN — PANTOPRAZOLE SODIUM 40 MG: 40 TABLET, DELAYED RELEASE ORAL at 08:24

## 2023-11-04 RX ADMIN — AMLODIPINE BESYLATE 10 MG: 10 TABLET ORAL at 08:24

## 2023-11-04 RX ADMIN — ICOSAPENT ETHYL 1 G: 1 CAPSULE ORAL at 17:04

## 2023-11-04 RX ADMIN — VENLAFAXINE HYDROCHLORIDE 150 MG: 150 CAPSULE, EXTENDED RELEASE ORAL at 08:24

## 2023-11-04 RX ADMIN — CARVEDILOL 25 MG: 12.5 TABLET, FILM COATED ORAL at 08:24

## 2023-11-04 RX ADMIN — LATANOPROST 1 DROP: 50 SOLUTION OPHTHALMIC at 21:11

## 2023-11-04 RX ADMIN — ICOSAPENT ETHYL 1 G: 1 CAPSULE ORAL at 08:24

## 2023-11-04 RX ADMIN — LOSARTAN POTASSIUM 100 MG: 50 TABLET, FILM COATED ORAL at 08:24

## 2023-11-04 RX ADMIN — INSULIN GLARGINE 40 UNITS: 100 INJECTION, SOLUTION SUBCUTANEOUS at 21:11

## 2023-11-04 ASSESSMENT — PAIN SCALES - GENERAL
PAINLEVEL_OUTOF10: 0 - NO PAIN
PAINLEVEL_OUTOF10: 0 - NO PAIN

## 2023-11-04 ASSESSMENT — PAIN - FUNCTIONAL ASSESSMENT: PAIN_FUNCTIONAL_ASSESSMENT: 0-10

## 2023-11-04 ASSESSMENT — COLUMBIA-SUICIDE SEVERITY RATING SCALE - C-SSRS
6. HAVE YOU EVER DONE ANYTHING, STARTED TO DO ANYTHING, OR PREPARED TO DO ANYTHING TO END YOUR LIFE?: NO
2. HAVE YOU ACTUALLY HAD ANY THOUGHTS OF KILLING YOURSELF?: NO
1. SINCE LAST CONTACT, HAVE YOU WISHED YOU WERE DEAD OR WISHED YOU COULD GO TO SLEEP AND NOT WAKE UP?: NO

## 2023-11-04 ASSESSMENT — COGNITIVE AND FUNCTIONAL STATUS - GENERAL
CLIMB 3 TO 5 STEPS WITH RAILING: A LITTLE
STANDING UP FROM CHAIR USING ARMS: A LITTLE
TURNING FROM BACK TO SIDE WHILE IN FLAT BAD: A LITTLE
MOVING TO AND FROM BED TO CHAIR: A LITTLE
WALKING IN HOSPITAL ROOM: A LITTLE
MOBILITY SCORE: 19

## 2023-11-04 NOTE — PROGRESS NOTES
Physical Therapy       11/04/23 [id number]   PT  Visit   PT Received On 11/04/23   Response to Previous Treatment Patient with no complaints from previous session.   General   Reason for Referral impaired mobility   Referred By Dr. Brooke   Past Medical History Relevant to Rehab HTN, hyperlipidemia, GERD, DM, neuropathy   Patient Position Received Up in chair   General Comment pleasant, cooperative, motivated for PT   Precautions   Medical Precautions Fall precautions   Therapeutic Exercise   Therapeutic Exercise Performed Yes   Therapeutic Exercise Activity 1 LAQ 2 x 15, 2#   Therapeutic Exercise Activity 2 seated hip flex 2 x 15, 2#   Therapeutic Exercise Activity 3 seated hamstring curls with green theraband 2 x 15   Therapeutic Exercise Activity 4 seated hip add/ball squeezes 2 x 15   Therapeutic Exercise Activity 5 seated hip abd with green theraband 2 x 15   Balance/Neuromuscular Re-Education   Balance/Neuromuscular Re-Education Activity 1 side stepping álvarez rail,10' x 4, right and left, no UE support, CGA   Balance/Neuromuscular Re-Education Activity 2 backward amb at álvarez rail, 10' x 2 no UE support, min assist, unsteady bwith LOB x 1 and min assist to regain   Ambulation/Gait Training   Ambulation/Gait Training Performed Yes   Ambulation/Gait Training 1   Surface 1 Level tile   Device 1 Rolling walker   Gait Support Devices Gait belt   Assistance 1 Distant supervision   Quality of Gait 1 Wide base of support   Comments/Distance (ft) 1 100' x 2 includes turns, WBOS, slight increased lateral sway, mild unsteadiness but no LOB. Cues for upright posture and to slow pce slightly.   Transfers   Transfer Yes   Transfer 1   Transfer From 1 Sit to   Transfer to 1 Stand   Technique 1 Sit to stand;Stand to sit   Transfer Device 1 Walker   Transfer Level of Assistance 1 Independent;Distant supervision   Trials/Comments 1 cues for hand placement with transfers as patient tends to keep hands on walker.   Activity  Tolerance   Endurance Tolerates 10 - 20 min exercise with multiple rests   PT Assessment   Evaluation/Treatment Tolerance Patient tolerated treatment well   Assessment Comment Motivated in therapy and displays good effort. Safety cues required for mobility tasks.   PT Plan   Inpatient/Swing Bed or Outpatient Inpatient   PT Plan   Treatment/Interventions Transfer training;Gait training;Neuromuscular re-education;Strengthening;Therapeutic exercise   PT Frequency 4 times per week   PT Discharge Recommendations Low intensity level of continued care   Equipment Recommended upon Discharge Wheeled walker   PT Recommended Transfer Status Stand by assist      11/04/23 2640   [facility] Basic Mobility   Turning from your back to your side while in a flat bed without using bedrails 4   Moving from lying on your back to sitting on the side of a flat bed without using bedrails 3   Moving to and from bed to chair (including a wheelchair) 3   Standing up from a chair using your arms (e.g. wheelchair or bedside chair) 3   To walk in hospital room 3   Climbing 3-5 steps with railing 3   Basic Mobility - Total Score 19

## 2023-11-04 NOTE — PROGRESS NOTES
"Madalyn Ricci is a 83 y.o. female on day 18 of admission presenting with Mood disorder (CMS/[facility]).      Subjective   Case discussed with nursing and chart reviewed.    Initially, she is in bed resting during visitation hour.  She is covered up in a supine position with blankets and there is a Bible placed on top of her.  Her eyes are closed and she is in no acute distress.  She awakens easily for encounter with provider.  She offers little spontaneous content but has no acute concerns.    Madalyn persists without complaints.  She continues to have a good appetite and is sleeping well.  Her mood is additionally noted to be good.  She is attending groups and participating appropriately.  Social work continues to work on optimal discharge planning and there are some financial obstacles presently while we wait for checks to clear for patient to be able to discharge.  Anticipate discharge after these have been clarified and completed.  Patient denying thoughts of harming self or others persisting without evidence of psychosis or diann.       Objective     Last Recorded Vitals  Blood pressure 143/62, pulse 65, temperature 36.4 °C (97.5 °F), temperature source Oral, resp. rate 18, height 1.626 m (5' 4.02\"), weight 127 kg (278 lb 15.9 oz), SpO2 99 %.    Review of Systems    Psychiatric ROS - Adult  Anxiety: Mild  Depression: negative  Delirium: negative  Psychosis: negative  Diann: negative  Safety Issues: none  Psychiatric ROS Comment: The patient states that she is not suicidal,  and denies AVH. The patient states that she is not depressed and the only concern now is a caring and safe environment for her to live in.    Physical Exam      Mental Status Exam  General: NAD  Appearance: Hospital attire  Attitude: pleasant  Behavior: cooperative  Motor Activity: using walker  Speech: normal rate and volume  Mood: ok  Affect: more open  Thought Process: more organized  Thought Content: no delusions elicited, denies " SI/HI  Thought Perception: denies AH/VH/paranoia  Cognition: fair  Insight: fair  Judgement: fair    Psychiatric Risk Assessment  Violence Risk Assessment: none  Acute Risk of Harm to Others is Considered: low   Suicide Risk Assessment: age > 65 yrs old, , chronic medical illness, history of trauma or abuse, and living alone or lack of social support  Protective Factors against Suicide: fear of suicide and Congregation affiliation/spirituality  Acute Risk of Harm to Self is Considered: low    Scheduled medications  amLODIPine, 10 mg, oral, Daily  atorvastatin, 40 mg, oral, Nightly  carvedilol, 25 mg, oral, BID with meals  icosapent ethyL, 1 g, oral, BID with meals  insulin glargine, 40 Units, subcutaneous, Nightly  latanoprost, 1 drop, Both Eyes, Nightly  losartan, 100 mg, oral, Daily  magnesium oxide, 400 mg, oral, Daily  pantoprazole, 40 mg, oral, Daily before breakfast  venlafaxine XR, 150 mg, oral, Daily      Continuous medications     PRN medications  PRN medications: acetaminophen, alum-mag hydroxide-simeth, dextrose 10 % in water (D10W), dextrose, diphenhydrAMINE **OR** diphenhydrAMINE, glucagon, magnesium hydroxide, OLANZapine **OR** OLANZapine, traZODone      Relevant Results               Results for orders placed or performed during the hospital encounter of 10/17/23 (from the past 96 hour(s))   POCT GLUCOSE   Result Value Ref Range    POCT Glucose 181 (H) 74 - 99 mg/dL   POCT GLUCOSE   Result Value Ref Range    POCT Glucose 177 (H) 74 - 99 mg/dL   POCT GLUCOSE   Result Value Ref Range    POCT Glucose 110 (H) 74 - 99 mg/dL   POCT GLUCOSE   Result Value Ref Range    POCT Glucose 189 (H) 74 - 99 mg/dL   POCT GLUCOSE   Result Value Ref Range    POCT Glucose 153 (H) 74 - 99 mg/dL   POCT GLUCOSE   Result Value Ref Range    POCT Glucose 161 (H) 74 - 99 mg/dL   POCT GLUCOSE   Result Value Ref Range    POCT Glucose 94 74 - 99 mg/dL   POCT GLUCOSE   Result Value Ref Range    POCT Glucose 131 (H) 74 - 99  mg/dL   POCT GLUCOSE   Result Value Ref Range    POCT Glucose 209 (H) 74 - 99 mg/dL   POCT GLUCOSE   Result Value Ref Range    POCT Glucose 156 (H) 74 - 99 mg/dL         Assessment/Plan   Principal Problem:    Mood disorder (CMS/[facility])  Active Problems:    Hyperlipidemia    GERD (gastroesophageal reflux disease)    Type 2 diabetes mellitus without complication, with long-term current use of insulin (CMS/[facility])    Primary hypertension    Biological -     Effexor xr 150 mg daily for depression     Discussion with patient regarding risk benefits and alternatives and side effects with opportunity to ask questions and questions answered.  Patient given consent to the plan as noted    2. Psychological -       Patient is encouraged to participate with the therapeutic milieu and program and group therapy      3. Sociological -      Patient encouraged to cooperate with social work staff on issues relevant to discharge planning  Anticipating discharge once financial obstacles have been cleared before which there is no lesser restrictive setting available to support patients current needs and level of functioning in the community                I spent 20 minutes in the professional and overall care of this patient.    Parts of this chart have been completed using voice recognition software.  Please excuse any errors of transcription.  Despite the medical decision making time stamp, my medical decision making has taken place during the patient's entire visit.  Thought process and reason for plan has been formulated from the time that I saw the patient until the time of disposition and is not specific to one specific moment during their visit and furthermore the medical decision making encompasses the entire chart and not only that represented in this note.      Navin Brooke,

## 2023-11-04 NOTE — GROUP NOTE
Group Topic: Leisure Skills   Group Date: 11/4/2023  Start Time: 0945  End Time: 1100  Facilitators: JENNIFER Gramajo   Department: [facility]    Number of Participants: 6   Group Focus: leisure skills  Treatment Modality: Other: Recreation therapy  Interventions utilized were exploration, leisure development, mental fitness, and problem solving  Purpose: coping skills, feelings, and insight or knowledge    Name: Madalyn Ricci YOB: 1940   MR: [id number]      Facilitator: Recreational Therapist  Level of Participation: active  Quality of Participation: appropriate/pleasant, attentive, and cooperative  Interactions with others: appropriate  Mood/Affect: appropriate  Triggers (if applicable): n/a  Cognition: coherent/clear  Progress: Moderate  Comments: pr problem is depressed mood.  Plan: continue with services

## 2023-11-04 NOTE — CARE PLAN
The patient's goals for the shift include no falls    The clinical goals for the shift include No falls    Over the shift, the patient did make progress toward the following goals. Barriers to progression include just waiting for placement, and is using her walker appropriately. Recommendations to address these barriers include positive reinforcement and encouragement for using the walker appropriately.      Problem: Fall/Injury  Goal: Not fall by end of shift  Outcome: Progressing     Problem: Fall/Injury  Goal: Be free from injury by end of the shift  Outcome: Progressing

## 2023-11-04 NOTE — NURSING NOTE
WISAM NOTE     Problem:  Depression     Behavior:  Patient is sitting in a chair in the group room watching television. Patient is pleasant and calm. Patient's affect is broad range. Patient is talkative. Patient maintains good eye contact.    Group Participation: N/A  Appetite/Meals: N/A       Interventions:  This nurse completed a shift assessment and administered patient's scheduled night time medications.    Response:  Patient remained pleasant and calm and was cooperative throughout this shift assessment and medication administration.     Plan:  Continue to monitor for depression. Continue to monitor for patient safety.

## 2023-11-05 LAB
GLUCOSE BLD MANUAL STRIP-MCNC: 103 MG/DL (ref 74–99)
GLUCOSE BLD MANUAL STRIP-MCNC: 138 MG/DL (ref 74–99)
GLUCOSE BLD MANUAL STRIP-MCNC: 170 MG/DL (ref 74–99)
GLUCOSE BLD MANUAL STRIP-MCNC: 179 MG/DL (ref 74–99)

## 2023-11-05 PROCEDURE — 2500000004 HC RX 250 GENERAL PHARMACY W/ HCPCS (ALT 636 FOR OP/ED): Performed by: INTERNAL MEDICINE

## 2023-11-05 PROCEDURE — 82947 ASSAY GLUCOSE BLOOD QUANT: CPT

## 2023-11-05 PROCEDURE — 2500000001 HC RX 250 WO HCPCS SELF ADMINISTERED DRUGS (ALT 637 FOR MEDICARE OP): Performed by: PSYCHIATRY & NEUROLOGY

## 2023-11-05 PROCEDURE — 2500000001 HC RX 250 WO HCPCS SELF ADMINISTERED DRUGS (ALT 637 FOR MEDICARE OP): Performed by: INTERNAL MEDICINE

## 2023-11-05 PROCEDURE — 99232 SBSQ HOSP IP/OBS MODERATE 35: CPT | Performed by: INTERNAL MEDICINE

## 2023-11-05 PROCEDURE — 1140000001 HC PRIVATE PSYCH ROOM DAILY

## 2023-11-05 PROCEDURE — 2500000002 HC RX 250 W HCPCS SELF ADMINISTERED DRUGS (ALT 637 FOR MEDICARE OP, ALT 636 FOR OP/ED): Performed by: INTERNAL MEDICINE

## 2023-11-05 PROCEDURE — 99231 SBSQ HOSP IP/OBS SF/LOW 25: CPT | Performed by: PSYCHIATRY & NEUROLOGY

## 2023-11-05 RX ADMIN — Medication 400 MG: at 08:33

## 2023-11-05 RX ADMIN — CARVEDILOL 25 MG: 12.5 TABLET, FILM COATED ORAL at 08:33

## 2023-11-05 RX ADMIN — ICOSAPENT ETHYL 1 G: 1 CAPSULE ORAL at 08:33

## 2023-11-05 RX ADMIN — LOSARTAN POTASSIUM 100 MG: 50 TABLET, FILM COATED ORAL at 08:33

## 2023-11-05 RX ADMIN — PANTOPRAZOLE SODIUM 40 MG: 40 TABLET, DELAYED RELEASE ORAL at 06:11

## 2023-11-05 RX ADMIN — AMLODIPINE BESYLATE 10 MG: 10 TABLET ORAL at 08:33

## 2023-11-05 RX ADMIN — ATORVASTATIN CALCIUM 40 MG: 40 TABLET, FILM COATED ORAL at 21:16

## 2023-11-05 RX ADMIN — LATANOPROST 1 DROP: 50 SOLUTION OPHTHALMIC at 21:16

## 2023-11-05 RX ADMIN — VENLAFAXINE HYDROCHLORIDE 150 MG: 150 CAPSULE, EXTENDED RELEASE ORAL at 08:33

## 2023-11-05 RX ADMIN — CARVEDILOL 25 MG: 12.5 TABLET, FILM COATED ORAL at 17:03

## 2023-11-05 RX ADMIN — ICOSAPENT ETHYL 1 G: 1 CAPSULE ORAL at 17:03

## 2023-11-05 RX ADMIN — INSULIN GLARGINE 40 UNITS: 100 INJECTION, SOLUTION SUBCUTANEOUS at 21:16

## 2023-11-05 ASSESSMENT — COLUMBIA-SUICIDE SEVERITY RATING SCALE - C-SSRS
2. HAVE YOU ACTUALLY HAD ANY THOUGHTS OF KILLING YOURSELF?: NO
6. HAVE YOU EVER DONE ANYTHING, STARTED TO DO ANYTHING, OR PREPARED TO DO ANYTHING TO END YOUR LIFE?: NO
2. HAVE YOU ACTUALLY HAD ANY THOUGHTS OF KILLING YOURSELF?: NO
1. SINCE LAST CONTACT, HAVE YOU WISHED YOU WERE DEAD OR WISHED YOU COULD GO TO SLEEP AND NOT WAKE UP?: NO
6. HAVE YOU EVER DONE ANYTHING, STARTED TO DO ANYTHING, OR PREPARED TO DO ANYTHING TO END YOUR LIFE?: NO
1. SINCE LAST CONTACT, HAVE YOU WISHED YOU WERE DEAD OR WISHED YOU COULD GO TO SLEEP AND NOT WAKE UP?: NO

## 2023-11-05 ASSESSMENT — PAIN SCALES - GENERAL
PAINLEVEL_OUTOF10: 0 - NO PAIN

## 2023-11-05 ASSESSMENT — PAIN - FUNCTIONAL ASSESSMENT
PAIN_FUNCTIONAL_ASSESSMENT: 0-10
PAIN_FUNCTIONAL_ASSESSMENT: 0-10

## 2023-11-05 NOTE — GROUP NOTE
Group Topic: Other   Group Date: 11/5/2023  Start Time: 1030  End Time: 1100  Facilitators: JENNIFER Martin   Department: [facility]    Number of Participants: 5   Group Focus: concentration, other  Treatment Modality: Other: Recreation Therapy  Interventions utilized were mental fitness  Purpose: other: increase socialization , increase attention span, increase self esteem, increase stimulation, elevate mood    Name: Madalyn Ricci YOB: 1940   MR: [id number]      Facilitator: Recreational Therapist  Level of Participation: active  Quality of Participation: appropriate/pleasant, cooperative, and engaged  Interactions with others: appropriate  Mood/Affect: appropriate and bright  Triggers (if applicable): n/a  Cognition: coherent/clear  Progress: Moderate  Comments: pt problem is depressed mood  Plan: continue with services

## 2023-11-05 NOTE — GROUP NOTE
Group Topic: Stress Reduction/Relaxation   Group Date: 11/5/2023  Start Time: 1100  End Time: 1120  Facilitators: JENNIFER Martin   Department: [facility]    Number of Participants: 3   Group Focus: mindfulness and relaxation  Treatment Modality: Other: Recreation Therapy  Interventions utilized were other Guided Imagery   Purpose: self-care and other: stress reduction, relaxation,     Name: Madalyn Ricci YOB: 1940   MR: [id number]      Facilitator: Recreational Therapist  Level of Participation: active  Quality of Participation: appropriate/pleasant, cooperative, and engaged  Interactions with others: appropriate  Mood/Affect: appropriate  Triggers (if applicable): n/a  Cognition: coherent/clear  Progress: Moderate  Comments: pt problem is depressed mood  Plan: continue with services

## 2023-11-05 NOTE — CARE PLAN
The patient's goals for the shift include no falling    The clinical goals for the shift include No falls    Over the shift, the patient did make progress toward the following goals. Barriers to progression include utilization of her walker in a more cavalier manner. Recommendations to address these barriers include increased encouragement to keep walker close, and use in a stable manner.      Problem: Fall/Injury  Goal: Not fall by end of shift  Outcome: Progressing     Problem: Fall/Injury  Goal: Be free from injury by end of the shift  Outcome: Progressing

## 2023-11-05 NOTE — NURSING NOTE
WISAM NOTE     Problem:  depression     Behavior:  Pt is socially engaged, addressed her needs, makes eye contact, denied SI  Group Participation: n/a  Appetite/Meals: HS snack provided       Interventions:  1:1 intervention provided, scheduled medication administered    Response:  Pt is compliant with her meds     Plan:  Continue monitoring

## 2023-11-05 NOTE — GROUP NOTE
Group Topic: Other   Group Date: 11/5/2023  Start Time: 0950  End Time: 1030  Facilitators: JENNIFER Martin   Department: [facility]    Number of Participants: 4   Group Focus: communication, feeling awareness/expression, goals, and self-awareness  Treatment Modality: Other: Recreation Therapy  Interventions utilized were exploration and problem solving  Purpose: feelings and insight or knowledge, increase socialization,     Name: Madalyn Ricci YOB: 1940   MR: [id number]      Facilitator: Recreational Therapist  Level of Participation: active  Quality of Participation: appropriate/pleasant, cooperative, and engaged  Interactions with others: appropriate  Mood/Affect: appropriate  Triggers (if applicable): n/a  Cognition: coherent/clear  Progress: Moderate  Comments: pt problem is depressed mood.  Plan: continue with services

## 2023-11-05 NOTE — PROGRESS NOTES
[facility]: [facility]  PROGRESS NOTE      Madalyn iRcci is a 83 y.o. female that is being seen  today for follow-up at [facility].  No chief complaint on file..  Subjective   Patient is being seen for follow-up of [facility].  Patient's blood pressure has been better today, blood sugars have been stable.  No recent falls or episodes of agitation.  Patient is usually in the wheelchair since she has difficulty with walking.  Denies any other symptoms.    ROS  Negative for fever or chills  Negative for sore throat, ear pain, nasal discharge  Negative for cough, shortness of breath or wheezing  Negative for chest pain, palpitations, swelling of legs  Negative for abdominal pain, constipation, diarrhea, blood in the stools  Negative for urinary complaints  Negative for headache, dizziness, weakness or numbness  Negative for joint pain.  Positive for difficulty in walking  Positive for anxiety  All other systems reviewed and were negative   Vitals:    11/05/23 0640   BP: 146/74   Pulse: 71   Resp: 20   Temp: 36.9 °C (98.4 °F)   SpO2: 99%       Physical Exam  Constitutional: Patient does not appear to be in any acute distress  Head and Face: NCAT  Eyes: Normal external exam, EOMI  ENT: Normal external inspection of ears and nose. Oropharynx normal.  Cardiovascular: RRR, S1/S2, no murmurs, rubs, or gallops, radial pulses +2, no edema of extremities  Pulmonary: CTAB, no respiratory distress.  Abdomen: +BS, soft, non-tender, nondistended, no guarding or rebound, no masses noted  MSK: No joint swelling, normal movements of all extremities. Range of motion- normal.  Skin- No lesions, contusions, or erythema.  Peripheral puslses palpable bilaterally 2+  Neuro: AAO X3, Cranial nerves 2-12 grossly intact,DTR 2+ in all 4 limbs   Psychiatric: Judgment intact. Appropriate mood and behavior    Diagnostic Results   Lab Results   Component Value Date    GLUCOSE 199 (H) 10/16/2023    CALCIUM 9.3 10/16/2023    NA  145 10/16/2023    K 3.7 10/16/2023    CO2 26 10/16/2023     (H) 10/16/2023    BUN 31 (H) 10/16/2023    CREATININE 1.20 10/16/2023     Lab Results   Component Value Date    ALT 11 10/16/2023    AST 16 10/16/2023    ALKPHOS 74 10/16/2023    BILITOT 0.3 10/16/2023     Lab Results   Component Value Date    WBC 6.4 10/16/2023    HGB 14.1 10/16/2023    HCT 44.1 10/16/2023    MCV 90 10/16/2023     10/16/2023     Lab Results   Component Value Date    CHOL 138 06/26/2020    CHOL 142 09/20/2019    CHOL 131 (L) 03/05/2019     Lab Results   Component Value Date    HDL 36 (L) 06/26/2020    HDL 36 (L) 09/20/2019    HDL 37 (L) 03/05/2019     Lab Results   Component Value Date    LDLCALC 44 (L) 06/26/2020    LDLCALC 36 (L) 09/20/2019    LDLCALC 49 (L) 03/05/2019     Lab Results   Component Value Date    TRIG 291 (H) 06/26/2020    TRIG 350 (H) 09/20/2019    TRIG 224 (H) 03/05/2019     Lab Results   Component Value Date    HGBA1C 6.4 (H) 12/12/2020     Other labs not included in the list above were reviewed either before or during this encounter.    History    Past Medical History:   Diagnosis Date    Diabetes mellitus (CMS/HCC)     Hypertension      Past Surgical History:   Procedure Laterality Date    APPENDECTOMY  10/13/2015    Appendectomy    CATARACT EXTRACTION  10/13/2015    Cataract Surgery    CHOLECYSTECTOMY  10/13/2015    Cholecystectomy    CT GUIDED IMAGING FOR NEEDLE PLACEMENT  2/4/2015    CT GUIDED IMAGING FOR NEEDLE PLACEMENT [facility]    HEMICOLECTOMY  10/13/2015    Hemicolectomy    HYSTERECTOMY  10/13/2015    Hysterectomy    LUNG LOBECTOMY  10/13/2015    Lung Lobectomy     No family history on file.  No Known Allergies  No current facility-administered medications on file prior to encounter.     Current Outpatient Medications on File Prior to Encounter   Medication Sig Dispense Refill    amLODIPine (Norvasc) 10 mg tablet Take 1 tablet (10 mg) by mouth once daily.      atorvastatin (Lipitor) 40 mg  "tablet Take 1 tablet (40 mg) by mouth once daily.      carvedilol (Coreg) 25 mg tablet Take 1 tablet (25 mg) by mouth 2 times a day with meals.      [] cefdinir (Omnicef) 300 mg capsule Take 1 capsule (300 mg) by mouth 2 times a day for 7 days. 14 capsule 0    icosapent ethyL (Vascepa) 1 gram capsule Take 1 capsule (1 g) by mouth 2 times a day with meals.      insulin aspart (NovoLOG U-100 Insulin aspart) 100 unit/mL injection Inject 100 Units under the skin 3 times a day before meals. Take as directed per insulin instructions.      insulin degludec (Tresiba FlexTouch U-200) 200 unit/mL (3 mL) injection Inject 40 Units under the skin once daily at bedtime. Take as directed per insulin instructions.      latanoprost (Xalatan) 0.005 % ophthalmic solution Administer 1 drop into both eyes once daily at bedtime.      losartan (Cozaar) 50 mg tablet Take 2 tablets (100 mg) by mouth once daily.      magnesium oxide (Mag-Ox) 400 mg tablet Take 1 tablet (400 mg) by mouth once daily.      omeprazole (PriLOSEC) 40 mg DR capsule Take 1 capsule (40 mg) by mouth once daily in the morning. Take before meals. Do not crush or chew.      pen needle, diabetic (BD Traci 2nd Gen Pen Needle) 32 gauge x \" needle       potassium chloride ER (Micro-K) 10 mEq ER capsule Take 2 capsules (20 mEq) by mouth once daily. Do not crush or chew.      venlafaxine XR (Effexor-XR) 150 mg 24 hr capsule Take 1 capsule (150 mg) by mouth once daily. Do not crush or chew.      [DISCONTINUED] ferrous sulfate 325 (65 Fe) MG EC tablet Take 65 mg by mouth 3 times a day with meals. Do not crush, chew, or split.      [DISCONTINUED] fluticasone propion-salmeteroL (Advair) 115-21 mcg/actuation inhaler Inhale 2 puffs 2 times a day. Rinse mouth with water after use to reduce aftertaste and incidence of candidiasis. Do not swallow.      [DISCONTINUED] hydroCHLOROthiazide (HYDRODiuril) 50 mg tablet Take 1 tablet (50 mg) by mouth once daily.      [DISCONTINUED] " vitamin E 450 mg (1000 unit) capsule Take 100 Units by mouth once daily.         There is no immunization history on file for this patient.  Patient's medical history was reviewed and updated either before or during this encounter.  ASSESSMENT / PLAN:  Active Hospital Problems    Hyperlipidemia      GERD (gastroesophageal reflux disease)      Type 2 diabetes mellitus without complication, with long-term current use of insulin (CMS/[facility])      Primary hypertension      *Mood disorder (CMS/[facility])    Patient's blood pressure is better .  We will monitor.  Blood sugars have been fairly controlled.  Denies any significant complaints.  Patient is on statins.  Patient is being seen by geropsych team.        Osiel Forman MD

## 2023-11-05 NOTE — PROGRESS NOTES
"Madalyn Ricci is a 83 y.o. female on day 19 of admission presenting with Mood disorder (CMS/[facility]).    Level 1 note    Subjective   Case discussed with nursing and chart reviewed.    Sitting calmly in the day area watching the [employer] game.  No acute concerns.  Remains focused on financial checks that allow her to leave the facility hopefully this coming week.  Continuing to deny acute features.  Participating appropriately.  Maintaining behavior control.  Attending groups.  Going to meals.  Sleeping well.  Absent of thoughts to harm self or others.  Absent of evidence to support psychosis or diann.  Medication adherent and tolerating meds without reported side effects.       Objective     Last Recorded Vitals  Blood pressure 146/74, pulse 71, temperature 36.9 °C (98.4 °F), temperature source Temporal, resp. rate 20, height 1.626 m (5' 4.02\"), weight 127 kg (278 lb 15.9 oz), SpO2 99 %.    Review of Systems    Psychiatric ROS - Adult  Anxiety: Mild  Depression: negative  Delirium: negative  Psychosis: negative  Diann: negative  Safety Issues: none  Psychiatric ROS Comment: The patient states that she is not suicidal,  and denies AVH. The patient states that she is not depressed and the only concern now is a caring and safe environment for her to live in.    Physical Exam      Mental Status Exam  General: NAD  Appearance: Hospital attire  Attitude: pleasant  Behavior: cooperative  Motor Activity: using walker  Speech: normal rate and volume  Mood: \"good - hopefully the checks come, all 3 of them\"  Affect: more open  Thought Process: more organized  Thought Content: no delusions elicited, denies SI/HI  Thought Perception: denies AH/VH/paranoia  Cognition: fair  Insight: fair  Judgement: fair    Psychiatric Risk Assessment  Violence Risk Assessment: none  Acute Risk of Harm to Others is Considered: low   Suicide Risk Assessment: age > 65 yrs old, , chronic medical illness, history of trauma or abuse, and living " alone or lack of social support  Protective Factors against Suicide: fear of suicide and [employer] affiliation/spirituality  Acute Risk of Harm to Self is Considered: low    Scheduled medications  amLODIPine, 10 mg, oral, Daily  atorvastatin, 40 mg, oral, Nightly  carvedilol, 25 mg, oral, BID with meals  icosapent ethyL, 1 g, oral, BID with meals  insulin glargine, 40 Units, subcutaneous, Nightly  latanoprost, 1 drop, Both Eyes, Nightly  losartan, 100 mg, oral, Daily  magnesium oxide, 400 mg, oral, Daily  pantoprazole, 40 mg, oral, Daily before breakfast  venlafaxine XR, 150 mg, oral, Daily      Continuous medications     PRN medications  PRN medications: acetaminophen, alum-mag hydroxide-simeth, dextrose 10 % in water (D10W), dextrose, diphenhydrAMINE **OR** diphenhydrAMINE, glucagon, magnesium hydroxide, OLANZapine **OR** OLANZapine, traZODone      Relevant Results               Results for orders placed or performed during the hospital encounter of 10/17/23 (from the past 96 hour(s))   POCT GLUCOSE   Result Value Ref Range    POCT Glucose 161 (H) 74 - 99 mg/dL   POCT GLUCOSE   Result Value Ref Range    POCT Glucose 94 74 - 99 mg/dL   POCT GLUCOSE   Result Value Ref Range    POCT Glucose 131 (H) 74 - 99 mg/dL   POCT GLUCOSE   Result Value Ref Range    POCT Glucose 209 (H) 74 - 99 mg/dL   POCT GLUCOSE   Result Value Ref Range    POCT Glucose 156 (H) 74 - 99 mg/dL   POCT GLUCOSE   Result Value Ref Range    POCT Glucose 179 (H) 74 - 99 mg/dL   POCT GLUCOSE   Result Value Ref Range    POCT Glucose 103 (H) 74 - 99 mg/dL   POCT GLUCOSE   Result Value Ref Range    POCT Glucose 138 (H) 74 - 99 mg/dL         Assessment/Plan   Principal Problem:    Mood disorder (CMS/[facility])  Active Problems:    Hyperlipidemia    GERD (gastroesophageal reflux disease)    Type 2 diabetes mellitus without complication, with long-term current use of insulin (CMS/[facility])    Primary hypertension    Biological -     Effexor xr 150 mg daily for  depression     Discussion with patient regarding risk benefits and alternatives and side effects with opportunity to ask questions and questions answered.  Patient given consent to the plan as noted    2. Psychological -       Patient is encouraged to participate with the therapeutic milieu and program and group therapy      3. Sociological -      Patient encouraged to cooperate with social work staff on issues relevant to discharge planning  Anticipating discharge once financial obstacles have been cleared before which there is no lesser restrictive setting available to support patients current needs and level of functioning in the community                I spent 15 minutes in the professional and overall care of this patient.    Parts of this chart have been completed using voice recognition software.  Please excuse any errors of transcription.  Despite the medical decision making time stamp, my medical decision making has taken place during the patient's entire visit.  Thought process and reason for plan has been formulated from the time that I saw the patient until the time of disposition and is not specific to one specific moment during their visit and furthermore the medical decision making encompasses the entire chart and not only that represented in this note.      Navin Brooke, DO

## 2023-11-06 LAB
GLUCOSE BLD MANUAL STRIP-MCNC: 150 MG/DL (ref 74–99)
GLUCOSE BLD MANUAL STRIP-MCNC: 182 MG/DL (ref 74–99)
GLUCOSE BLD MANUAL STRIP-MCNC: 186 MG/DL (ref 74–99)
GLUCOSE BLD MANUAL STRIP-MCNC: 87 MG/DL (ref 74–99)

## 2023-11-06 PROCEDURE — 2500000002 HC RX 250 W HCPCS SELF ADMINISTERED DRUGS (ALT 637 FOR MEDICARE OP, ALT 636 FOR OP/ED): Performed by: INTERNAL MEDICINE

## 2023-11-06 PROCEDURE — 2500000004 HC RX 250 GENERAL PHARMACY W/ HCPCS (ALT 636 FOR OP/ED): Performed by: INTERNAL MEDICINE

## 2023-11-06 PROCEDURE — 99232 SBSQ HOSP IP/OBS MODERATE 35: CPT | Performed by: REGISTERED NURSE

## 2023-11-06 PROCEDURE — 99232 SBSQ HOSP IP/OBS MODERATE 35: CPT | Performed by: INTERNAL MEDICINE

## 2023-11-06 PROCEDURE — 2500000001 HC RX 250 WO HCPCS SELF ADMINISTERED DRUGS (ALT 637 FOR MEDICARE OP): Performed by: PSYCHIATRY & NEUROLOGY

## 2023-11-06 PROCEDURE — 1140000001 HC PRIVATE PSYCH ROOM DAILY

## 2023-11-06 PROCEDURE — 2500000001 HC RX 250 WO HCPCS SELF ADMINISTERED DRUGS (ALT 637 FOR MEDICARE OP): Performed by: INTERNAL MEDICINE

## 2023-11-06 PROCEDURE — 82947 ASSAY GLUCOSE BLOOD QUANT: CPT

## 2023-11-06 RX ADMIN — LOSARTAN POTASSIUM 100 MG: 50 TABLET, FILM COATED ORAL at 08:17

## 2023-11-06 RX ADMIN — AMLODIPINE BESYLATE 10 MG: 10 TABLET ORAL at 08:17

## 2023-11-06 RX ADMIN — ATORVASTATIN CALCIUM 40 MG: 40 TABLET, FILM COATED ORAL at 22:37

## 2023-11-06 RX ADMIN — CARVEDILOL 25 MG: 12.5 TABLET, FILM COATED ORAL at 17:04

## 2023-11-06 RX ADMIN — INSULIN GLARGINE 40 UNITS: 100 INJECTION, SOLUTION SUBCUTANEOUS at 22:38

## 2023-11-06 RX ADMIN — ICOSAPENT ETHYL 1 G: 1 CAPSULE ORAL at 17:04

## 2023-11-06 RX ADMIN — LATANOPROST 1 DROP: 50 SOLUTION OPHTHALMIC at 22:37

## 2023-11-06 RX ADMIN — PANTOPRAZOLE SODIUM 40 MG: 40 TABLET, DELAYED RELEASE ORAL at 06:16

## 2023-11-06 RX ADMIN — ICOSAPENT ETHYL 1 G: 1 CAPSULE ORAL at 08:17

## 2023-11-06 RX ADMIN — Medication 400 MG: at 08:17

## 2023-11-06 RX ADMIN — VENLAFAXINE HYDROCHLORIDE 150 MG: 150 CAPSULE, EXTENDED RELEASE ORAL at 08:17

## 2023-11-06 RX ADMIN — CARVEDILOL 25 MG: 12.5 TABLET, FILM COATED ORAL at 08:17

## 2023-11-06 ASSESSMENT — COLUMBIA-SUICIDE SEVERITY RATING SCALE - C-SSRS
6. HAVE YOU EVER DONE ANYTHING, STARTED TO DO ANYTHING, OR PREPARED TO DO ANYTHING TO END YOUR LIFE?: NO
2. HAVE YOU ACTUALLY HAD ANY THOUGHTS OF KILLING YOURSELF?: NO
1. SINCE LAST CONTACT, HAVE YOU WISHED YOU WERE DEAD OR WISHED YOU COULD GO TO SLEEP AND NOT WAKE UP?: NO
6. HAVE YOU EVER DONE ANYTHING, STARTED TO DO ANYTHING, OR PREPARED TO DO ANYTHING TO END YOUR LIFE?: NO
1. SINCE LAST CONTACT, HAVE YOU WISHED YOU WERE DEAD OR WISHED YOU COULD GO TO SLEEP AND NOT WAKE UP?: NO
2. HAVE YOU ACTUALLY HAD ANY THOUGHTS OF KILLING YOURSELF?: NO

## 2023-11-06 ASSESSMENT — PAIN SCALES - GENERAL: PAINLEVEL_OUTOF10: 0 - NO PAIN

## 2023-11-06 ASSESSMENT — PAIN - FUNCTIONAL ASSESSMENT: PAIN_FUNCTIONAL_ASSESSMENT: 0-10

## 2023-11-06 NOTE — NURSING NOTE
WISAM NOTE     Problem:  depression     Behavior:  Pt is socially engaged, addressed her needs appropriately, pleasant and cooperative, denied SI  Group Participation: n/a  Appetite/Meals: HS snack provided       Interventions:  1:1 intervention provided, scheduled medication administered, health related concerns addressed    Response:  Pt is compliant wit her medication, verbalized understanding of her current health issues related to diabetes     Plan:  Continue monitoring till d/c

## 2023-11-06 NOTE — GROUP NOTE
Group Topic: Music Therapy   Group Date: 11/6/2023  Start Time: 1000  End Time: 1100  Facilitators: Mandi Payan   Department: [facility]    Number of Participants: 5   Group Focus: music therapy  Treatment Modality: Music Therapy  Interventions utilized were other movement to music, manju analysis, active music engagement, passive music engagement.  Purpose: feelings, communication skills, and self-care    Name: Madalyn Ricci YOB: 1940   MR: [id number]      Facilitator: Music Therapist  Level of Participation: moderate  Quality of Participation: appropriate/pleasant and quiet  Interactions with others: appropriate  Mood/Affect: appropriate and flat  Triggers (if applicable): n/a  Cognition: coherent/clear and goal directed  Progress: Moderate  Comments: n/a  Plan: continue with services

## 2023-11-06 NOTE — GROUP NOTE
Group Topic: Other   Group Date: 11/6/2023  Start Time: 1100  End Time: 1135  Facilitators: JENNIFER Martin   Department: [facility] REHABTH [facility]IRTUAL    Number of Participants: 4   Group Focus: other following instructions, and social skills, memory re-call  Treatment Modality: Other: Recreation Therapy  Interventions utilized were mental fitness, reminiscence, and story telling  Purpose: other: increase attention span, increase memory re-call, increase self esteem, elevate mood,     Name: Madalyn Ricci YOB: 1940   MR: [id number]      Facilitator: Recreational Therapist  Level of Participation: minimal  Quality of Participation: quiet  Interactions with others: appropriate  Mood/Affect: appropriate  Triggers (if applicable): n/a  Cognition: coherent/clear  Progress: Minimal  Comments: pt problem is depressed mood  Plan: continue with services

## 2023-11-06 NOTE — PROGRESS NOTES
KAYCEE called grand daughter. Phone was forward to  after two rings. KAYCEE left  inquiring about checks and requesting to have grand daughter call back.

## 2023-11-06 NOTE — PROGRESS NOTES
Madalyn Ricci is a 83 y.o. female on day 20 of admission presenting with Mood disorder (CMS/HCC).Madalyn Ricci is a 83 y.o. female on day 20 of admission presenting with Mood disorder (CMS/HCC).Madalyn Ricci is a 83 y.o. female on day 20 of admission presenting with Mood disorder (CMS/HCC).      Patient is an 84yo female admitted for Mood Disorder.    Case discussed in morning team.    Vitals signs reviewed  Patient case was discussed in morning team.  She slept 8hs appetite is good, mood is good. Taking meds as ordered. No prns required. She is attending groups.  She is participating in PT/OT.  Social work is looking into discharge plan. [facility] was accepted and social work is attempting to find SNF placement.  Other daughter says patient can come to her for a night after check comes. Patient is anticipating discharge after check arrives. She was interviewed in group room.    Past Medical History  She has a past medical history of Diabetes mellitus (CMS/HCC) and Hypertension.    Past Psychiatric History:   Previous therapy: no  Previous psychiatric treatment and medication trials: no  Previous psychiatric hospitalizations: no  Previous diagnoses: no  Previous suicide attempts: no  History of violence: no  Currently in treatment with N/A.  Depression screening was performed with standardized tool: Yes, Depression on admission    Surgical History  She has a past surgical history that includes Lung lobectomy (10/13/2015); Hysterectomy (10/13/2015); Cholecystectomy (10/13/2015); Appendectomy (10/13/2015); Hemicolectomy (10/13/2015); Cataract extraction (10/13/2015); and CT guided imaging for needle placement (2/4/2015).     Social History  She reports that she has never smoked. She has never used smokeless tobacco. No history on file for alcohol use and drug use.     Allergies  Patient has no known allergies.    Review of Systems    Psychiatric ROS - Adult  Anxiety: Mild  Depression: negative  Delirium:  "negative  Psychosis: negative  Margret: negative  Safety Issues: none  Psychiatric ROS Comment: The patient states that she is not suicidal,  and denies AVH. The patient states that she is not depressed and the only concern now is a caring and safe environment for her to live in.    Physical Exam      Mental Status Exam  General: NAD  Appearance: Hospital attire  Attitude: pleasant  Behavior: cooperative  Motor Activity: using walker  Speech: normal rate and volume  Mood: ok  Affect: more open  Thought Process: more organized  Thought Content: no delusions elicited, denies SI/HI  Thought Perception: denies AH/VH/paranoia  Cognition: fair  Insight: fair  Judgement: fair    Psychiatric Risk Assessment  Violence Risk Assessment: none  Acute Risk of Harm to Others is Considered: low   Suicide Risk Assessment: age > 65 yrs old, , chronic medical illness, history of trauma or abuse, and living alone or lack of social support  Protective Factors against Suicide: fear of suicide and [employer] affiliation/spirituality  Acute Risk of Harm to Self is Considered: low    Last Recorded Vitals  Blood pressure (!) 128/93, pulse 84, temperature 36.1 °C (97 °F), temperature source Temporal, resp. rate 17, height 1.626 m (5' 4.02\"), weight 127 kg (278 lb 15.9 oz), SpO2 99 %.    Relevant Results      Current Facility-Administered Medications:     acetaminophen (Tylenol) tablet 650 mg, 650 mg, oral, q4h PRN, Navin Brooke DO, 650 mg at 10/29/23 0755    alum-mag hydroxide-simeth (Mylanta) 200-200-20 mg/5 mL oral suspension 30 mL, 30 mL, oral, q6h PRN, Navin Brooke DO    amLODIPine (Norvasc) tablet 10 mg, 10 mg, oral, Daily, Osiel Forman MD, 10 mg at 11/06/23 0817    atorvastatin (Lipitor) tablet 40 mg, 40 mg, oral, Nightly, Osiel Forman MD, 40 mg at 11/05/23 2116    carvedilol (Coreg) tablet 25 mg, 25 mg, oral, BID with meals, Osiel Forman MD, 25 mg at 11/06/23 0817    dextrose 10 % in water (D10W) " infusion, 0.3 g/kg/hr, intravenous, Once PRN, Osiel Forman MD    dextrose 50 % injection 25 g, 25 g, intravenous, q15 min PRN, Osiel Forman MD    diphenhydrAMINE (BENADryl) capsule 50 mg, 50 mg, oral, q6h PRN **OR** diphenhydrAMINE (BENADryl) injection 50 mg, 50 mg, intramuscular, Once PRN, Navin Brooke DO    glucagon (Glucagen) injection 1 mg, 1 mg, intramuscular, q15 min PRN, Osiel Forman MD    icosapent ethyL (Vascepa) capsule 1 g, 1 g, oral, BID with meals, Osiel Forman MD, 1 g at 11/06/23 0817    insulin glargine (Lantus) injection 40 Units, 40 Units, subcutaneous, Nightly, Osiel Forman MD, 40 Units at 11/05/23 2116    latanoprost (Xalatan) 0.005 % ophthalmic solution 1 drop, 1 drop, Both Eyes, Nightly, Osiel Forman MD, 1 drop at 11/05/23 2116    losartan (Cozaar) tablet 100 mg, 100 mg, oral, Daily, Osiel Forman MD, 100 mg at 11/06/23 0817    magnesium hydroxide (Milk of Magnesia) 400 mg/5 mL suspension 30 mL, 30 mL, oral, Daily PRN, Navin Brooke DO    magnesium oxide (Mag-Ox) tablet 400 mg, 400 mg, oral, Daily, Osiel Forman MD, 400 mg at 11/06/23 0817    OLANZapine (ZyPREXA) tablet 5 mg, 5 mg, oral, q6h PRN **OR** OLANZapine (ZyPREXA) injection 5 mg, 5 mg, intramuscular, q6h PRN, Navin Brooke DO    pantoprazole (ProtoNix) EC tablet 40 mg, 40 mg, oral, Daily before breakfast, Osiel Forman MD, 40 mg at 11/06/23 0616    traZODone (Desyrel) tablet 25 mg, 25 mg, oral, Nightly PRN, Navin Brooke DO    venlafaxine XR (Effexor-XR) 24 hr capsule 150 mg, 150 mg, oral, Daily, Navin Brooke DO, 150 mg at 11/06/23 0817      Assessment/Plan   Principal Problem:    Mood disorder   Active Problems:    Hyperlipidemia    GERD (gastroesophageal reflux disease)    Type 2 diabetes mellitus without complication, with long-term current use of insulin (CMS/[facility])    Primary hypertension    Continue meds as ordered  Continue daily rounds.    Continue to encourage scheduled medication.  Encourage participation in group therapy- goes to every group.  Appreciate social work arranging placement in assisted living - patient will be discharged to daughters home when check comes and will go from there to assisted living. Social work is following progress and facilitating transfer to SNF           Medication Consent  Medication Consent: risks, benefits, side effects reviewed for all ordered medications    I spent 30  minutes in the professional and overall care of this patient.                  I spent 25 minutes in the professional and overall care of this patient.      Apolonia Loving, EMILIANA-CNS

## 2023-11-06 NOTE — PROGRESS NOTES
[facility]: [facility]  PROGRESS NOTE      Madalyn Ricci is a 83 y.o. female that is being seen  today for follow-up at [facility].  No chief complaint on file..  Subjective   Patient is being seen for follow-up of [facility].  Patient's blood pressure has been better today, blood sugars have been stable.  No recent falls or episodes of agitation.  Patient is usually in the wheelchair since she has difficulty with walking.  Denies any other symptoms.    ROS  Negative for fever or chills  Negative for sore throat, ear pain, nasal discharge  Negative for cough, shortness of breath or wheezing  Negative for chest pain, palpitations, swelling of legs  Negative for abdominal pain, constipation, diarrhea, blood in the stools  Negative for urinary complaints  Negative for headache, dizziness, weakness or numbness  Negative for joint pain.  Positive for difficulty in walking  Positive for anxiety  All other systems reviewed and were negative   Vitals:    11/06/23 0817   BP: 159/77   Pulse: 70   Resp:    Temp:    SpO2:         Physical Exam  Constitutional: Patient does not appear to be in any acute distress  Head and Face: NCAT  Eyes: Normal external exam, EOMI  ENT: Normal external inspection of ears and nose. Oropharynx normal.  Cardiovascular: RRR, S1/S2, no murmurs, rubs, or gallops, radial pulses +2, no edema of extremities  Pulmonary: CTAB, no respiratory distress.  Abdomen: +BS, soft, non-tender, nondistended, no guarding or rebound, no masses noted  MSK: No joint swelling, normal movements of all extremities. Range of motion- normal.  Skin- No lesions, contusions, or erythema.  Peripheral puslses palpable bilaterally 2+  Neuro: AAO X3, Cranial nerves 2-12 grossly intact,DTR 2+ in all 4 limbs   Psychiatric: Judgment intact. Appropriate mood and behavior    Diagnostic Results   Lab Results   Component Value Date    GLUCOSE 199 (H) 10/16/2023    CALCIUM 9.3 10/16/2023     10/16/2023    K  3.7 10/16/2023    CO2 26 10/16/2023     (H) 10/16/2023    BUN 31 (H) 10/16/2023    CREATININE 1.20 10/16/2023     Lab Results   Component Value Date    ALT 11 10/16/2023    AST 16 10/16/2023    ALKPHOS 74 10/16/2023    BILITOT 0.3 10/16/2023     Lab Results   Component Value Date    WBC 6.4 10/16/2023    HGB 14.1 10/16/2023    HCT 44.1 10/16/2023    MCV 90 10/16/2023     10/16/2023     Lab Results   Component Value Date    CHOL 138 06/26/2020    CHOL 142 09/20/2019    CHOL 131 (L) 03/05/2019     Lab Results   Component Value Date    HDL 36 (L) 06/26/2020    HDL 36 (L) 09/20/2019    HDL 37 (L) 03/05/2019     Lab Results   Component Value Date    LDLCALC 44 (L) 06/26/2020    LDLCALC 36 (L) 09/20/2019    LDLCALC 49 (L) 03/05/2019     Lab Results   Component Value Date    TRIG 291 (H) 06/26/2020    TRIG 350 (H) 09/20/2019    TRIG 224 (H) 03/05/2019     Lab Results   Component Value Date    HGBA1C 6.4 (H) 12/12/2020     Other labs not included in the list above were reviewed either before or during this encounter.    History    Past Medical History:   Diagnosis Date    Diabetes mellitus (CMS/HCC)     Hypertension      Past Surgical History:   Procedure Laterality Date    APPENDECTOMY  10/13/2015    Appendectomy    CATARACT EXTRACTION  10/13/2015    Cataract Surgery    CHOLECYSTECTOMY  10/13/2015    Cholecystectomy    CT GUIDED IMAGING FOR NEEDLE PLACEMENT  2/4/2015    CT GUIDED IMAGING FOR NEEDLE PLACEMENT [facility]    HEMICOLECTOMY  10/13/2015    Hemicolectomy    HYSTERECTOMY  10/13/2015    Hysterectomy    LUNG LOBECTOMY  10/13/2015    Lung Lobectomy     No family history on file.  No Known Allergies  No current facility-administered medications on file prior to encounter.     Current Outpatient Medications on File Prior to Encounter   Medication Sig Dispense Refill    amLODIPine (Norvasc) 10 mg tablet Take 1 tablet (10 mg) by mouth once daily.      atorvastatin (Lipitor) 40 mg tablet Take 1  "tablet (40 mg) by mouth once daily.      carvedilol (Coreg) 25 mg tablet Take 1 tablet (25 mg) by mouth 2 times a day with meals.      [] cefdinir (Omnicef) 300 mg capsule Take 1 capsule (300 mg) by mouth 2 times a day for 7 days. 14 capsule 0    icosapent ethyL (Vascepa) 1 gram capsule Take 1 capsule (1 g) by mouth 2 times a day with meals.      insulin aspart (NovoLOG U-100 Insulin aspart) 100 unit/mL injection Inject 100 Units under the skin 3 times a day before meals. Take as directed per insulin instructions.      insulin degludec (Tresiba FlexTouch U-200) 200 unit/mL (3 mL) injection Inject 40 Units under the skin once daily at bedtime. Take as directed per insulin instructions.      latanoprost (Xalatan) 0.005 % ophthalmic solution Administer 1 drop into both eyes once daily at bedtime.      losartan (Cozaar) 50 mg tablet Take 2 tablets (100 mg) by mouth once daily.      magnesium oxide (Mag-Ox) 400 mg tablet Take 1 tablet (400 mg) by mouth once daily.      omeprazole (PriLOSEC) 40 mg DR capsule Take 1 capsule (40 mg) by mouth once daily in the morning. Take before meals. Do not crush or chew.      pen needle, diabetic (BD Traci 2nd Gen Pen Needle) 32 gauge x 5/32\" needle       potassium chloride ER (Micro-K) 10 mEq ER capsule Take 2 capsules (20 mEq) by mouth once daily. Do not crush or chew.      venlafaxine XR (Effexor-XR) 150 mg 24 hr capsule Take 1 capsule (150 mg) by mouth once daily. Do not crush or chew.      [DISCONTINUED] ferrous sulfate 325 (65 Fe) MG EC tablet Take 65 mg by mouth 3 times a day with meals. Do not crush, chew, or split.      [DISCONTINUED] fluticasone propion-salmeteroL (Advair) 115-21 mcg/actuation inhaler Inhale 2 puffs 2 times a day. Rinse mouth with water after use to reduce aftertaste and incidence of candidiasis. Do not swallow.      [DISCONTINUED] hydroCHLOROthiazide (HYDRODiuril) 50 mg tablet Take 1 tablet (50 mg) by mouth once daily.      [DISCONTINUED] vitamin E 450 " mg (1000 unit) capsule Take 100 Units by mouth once daily.         There is no immunization history on file for this patient.  Patient's medical history was reviewed and updated either before or during this encounter.  ASSESSMENT / PLAN:  Active Hospital Problems    Hyperlipidemia      GERD (gastroesophageal reflux disease)      Type 2 diabetes mellitus without complication, with long-term current use of insulin (CMS/[facility])      Primary hypertension      *Mood disorder (CMS/[facility])    Patient's blood pressure is better .  We will monitor.  Blood sugars have been fairly controlled.  Denies any significant complaints.  Patient is on statins.  Patient is being seen by geropsych team.        Osiel Forman MD

## 2023-11-06 NOTE — NURSING NOTE
WISAM NOTE     Problem:  Depression     Behavior:  Pt OOR for meals, attends all groups. Pleasant, behavior in control. Compliant with meds, cooperative with care. Using phone appropriately this afternoon talking to her children.  Group Participation: yes -100%   Appetite/Meals: Good 100% meals     Interventions:  1:1 provided. Scheduled meds given per orders. Encouraged to make needs known.    Response:  Pt calm, in bed reading after dinner. No complaints voiced.     Plan:  Will continue to monitor behaviors and effectiveness of interventions while maintaining pt safety.

## 2023-11-06 NOTE — GROUP NOTE
Group Topic: Feeling Awareness/Expression   Group Date: 11/6/2023  Start Time: 1500  End Time: 1600  Facilitators: JENNIFER Martin   Department: [facility] VIRTUAL    Number of Participants: 6   Group Focus: feeling awareness/expression and social skills  Treatment Modality: Other: Recreation Therapy  Interventions utilized were exploration and reminiscence  Purpose: feelings, insight or knowledge, and other: exploration, increase self esteem, increase stimulation     Name: Madalyn Ricci YOB: 1940   MR: [id number]      Facilitator: Recreational Therapist  Level of Participation: active  Quality of Participation: appropriate/pleasant, cooperative, and engaged  Interactions with others: appropriate  Mood/Affect: appropriate  Triggers (if applicable): n/a  Cognition: coherent/clear  Progress: Moderate  Comments: pt problem is depressed mood  Plan: continue with services

## 2023-11-07 LAB
GLUCOSE BLD MANUAL STRIP-MCNC: 100 MG/DL (ref 74–99)
GLUCOSE BLD MANUAL STRIP-MCNC: 113 MG/DL (ref 74–99)
GLUCOSE BLD MANUAL STRIP-MCNC: 197 MG/DL (ref 74–99)
GLUCOSE BLD MANUAL STRIP-MCNC: 224 MG/DL (ref 74–99)

## 2023-11-07 PROCEDURE — 97530 THERAPEUTIC ACTIVITIES: CPT | Mod: GP,CQ

## 2023-11-07 PROCEDURE — 82947 ASSAY GLUCOSE BLOOD QUANT: CPT

## 2023-11-07 PROCEDURE — 2500000001 HC RX 250 WO HCPCS SELF ADMINISTERED DRUGS (ALT 637 FOR MEDICARE OP): Performed by: PSYCHIATRY & NEUROLOGY

## 2023-11-07 PROCEDURE — 2500000001 HC RX 250 WO HCPCS SELF ADMINISTERED DRUGS (ALT 637 FOR MEDICARE OP): Performed by: INTERNAL MEDICINE

## 2023-11-07 PROCEDURE — 2500000004 HC RX 250 GENERAL PHARMACY W/ HCPCS (ALT 636 FOR OP/ED): Performed by: INTERNAL MEDICINE

## 2023-11-07 PROCEDURE — 99232 SBSQ HOSP IP/OBS MODERATE 35: CPT | Performed by: REGISTERED NURSE

## 2023-11-07 PROCEDURE — 1140000001 HC PRIVATE PSYCH ROOM DAILY

## 2023-11-07 RX ADMIN — INSULIN GLARGINE 40 UNITS: 100 INJECTION, SOLUTION SUBCUTANEOUS at 21:02

## 2023-11-07 RX ADMIN — PANTOPRAZOLE SODIUM 40 MG: 40 TABLET, DELAYED RELEASE ORAL at 08:13

## 2023-11-07 RX ADMIN — CARVEDILOL 25 MG: 12.5 TABLET, FILM COATED ORAL at 17:04

## 2023-11-07 RX ADMIN — VENLAFAXINE HYDROCHLORIDE 150 MG: 150 CAPSULE, EXTENDED RELEASE ORAL at 08:13

## 2023-11-07 RX ADMIN — LATANOPROST 1 DROP: 50 SOLUTION OPHTHALMIC at 21:01

## 2023-11-07 RX ADMIN — Medication 400 MG: at 08:13

## 2023-11-07 RX ADMIN — ICOSAPENT ETHYL 1 G: 1 CAPSULE ORAL at 08:13

## 2023-11-07 RX ADMIN — CARVEDILOL 25 MG: 12.5 TABLET, FILM COATED ORAL at 08:13

## 2023-11-07 RX ADMIN — ICOSAPENT ETHYL 1 G: 1 CAPSULE ORAL at 17:04

## 2023-11-07 RX ADMIN — LOSARTAN POTASSIUM 100 MG: 50 TABLET, FILM COATED ORAL at 08:13

## 2023-11-07 RX ADMIN — AMLODIPINE BESYLATE 10 MG: 10 TABLET ORAL at 08:13

## 2023-11-07 RX ADMIN — ATORVASTATIN CALCIUM 40 MG: 40 TABLET, FILM COATED ORAL at 21:01

## 2023-11-07 ASSESSMENT — COLUMBIA-SUICIDE SEVERITY RATING SCALE - C-SSRS
6. HAVE YOU EVER DONE ANYTHING, STARTED TO DO ANYTHING, OR PREPARED TO DO ANYTHING TO END YOUR LIFE?: NO
6. HAVE YOU EVER DONE ANYTHING, STARTED TO DO ANYTHING, OR PREPARED TO DO ANYTHING TO END YOUR LIFE?: NO
2. HAVE YOU ACTUALLY HAD ANY THOUGHTS OF KILLING YOURSELF?: NO
1. SINCE LAST CONTACT, HAVE YOU WISHED YOU WERE DEAD OR WISHED YOU COULD GO TO SLEEP AND NOT WAKE UP?: NO
2. HAVE YOU ACTUALLY HAD ANY THOUGHTS OF KILLING YOURSELF?: NO
6. HAVE YOU EVER DONE ANYTHING, STARTED TO DO ANYTHING, OR PREPARED TO DO ANYTHING TO END YOUR LIFE?: NO
1. SINCE LAST CONTACT, HAVE YOU WISHED YOU WERE DEAD OR WISHED YOU COULD GO TO SLEEP AND NOT WAKE UP?: NO
2. HAVE YOU ACTUALLY HAD ANY THOUGHTS OF KILLING YOURSELF?: NO
1. SINCE LAST CONTACT, HAVE YOU WISHED YOU WERE DEAD OR WISHED YOU COULD GO TO SLEEP AND NOT WAKE UP?: NO

## 2023-11-07 ASSESSMENT — PAIN - FUNCTIONAL ASSESSMENT
PAIN_FUNCTIONAL_ASSESSMENT: 0-10
PAIN_FUNCTIONAL_ASSESSMENT: 0-10

## 2023-11-07 ASSESSMENT — PAIN SCALES - GENERAL
PAINLEVEL_OUTOF10: 0 - NO PAIN

## 2023-11-07 NOTE — GROUP NOTE
Group Topic: Other   Group Date: 11/7/2023  Start Time: 1000  End Time: 1050  Facilitators: Dmitriy Sanchez LCSW   Department: [facility]     Number of Participants: 5   Group Focus: The importance of life and achieving change we want.  Treatment Modality: Cognitive Behavioral Therapy  Interventions utilized were exploration  Purpose: feelings, irrational fears, communication skills, insight or knowledge, self-worth, and self-care    Name: Madalyn Ricci YOB: 1940   MR: [id number]      Facilitator:   Level of Participation: when cued  Quality of Participation: attentive and cooperative  Interactions with others: appropriate  Mood/Affect: appropriate  Triggers (if applicable): n/a  Cognition: coherent/clear  Progress: Moderate  Comments: Pt shared appropriate insight regarding today's topic and would share her thoughts when she was called upon.   Plan: continue with services

## 2023-11-07 NOTE — CARE PLAN
The patient's goals for the shift include no falls    The clinical goals for the shift include No falls    Over the shift, the patient did make progress toward the following goals. Barriers to progression include using walker loosely, does not rely heavily on it, leaves it off to the side often. Recommendations to address these barriers include continuing encouragement to use the walker whenever mobilizing.      Problem: Fall/Injury  Goal: Not fall by end of shift  Outcome: Progressing     Problem: Fall/Injury  Goal: Be free from injury by end of the shift  Outcome: Progressing

## 2023-11-07 NOTE — PROGRESS NOTES
KAYCEE called youngest daughter who confirmed that pt did receive one of her checks that was worth a little more than 200 dollars. But that the other two checks are still in transit. Daughter shared that pt will need to call social security to check the status of the other two checks. Daughter shared that pt's  will be coming today to drop off this information    KAYCEE reached out to [facility] to determine the status of the assisted living and how much pt would owe for her first month of rent.     KAYCEE reached out to [facility] to share concerns related to the funding and pt no longer meeting criteria for hospitalization.

## 2023-11-07 NOTE — GROUP NOTE
Group Topic: Other   Group Date: 11/7/2023  Start Time: 1530  End Time: 1615  Facilitators: JENNIFER Gramajo   Department: [facility]IRT[facility]    Number of Participants: 4   Group Focus: [facility]   Treatment Modality: Other: Recreation Therapy  Interventions utilized were mental fitness and reminiscence  Purpose: other: increase memory, increase stimulation, elevate mood, enhance self esteem, increase alertness,     Name: Madalyn Ricci YOB: 1940   MR: [id number]      Facilitator: Recreational Therapist  Level of Participation: active  Quality of Participation: appropriate/pleasant, attentive, and cooperative  Interactions with others: appropriate  Mood/Affect: appropriate  Triggers (if applicable): n/a  Cognition: coherent/clear  Progress: Moderate  Comments: pt problem is depressed mood.  Plan: continue with services

## 2023-11-07 NOTE — GROUP NOTE
Group Topic: Other   Group Date: 11/7/2023  Start Time: 1100  End Time: 1130  Facilitators: JENNIFER Gramajo   Department: [facility] REH[facility]TH VIRTUAL    Number of Participants: 4   Group Focus: reminiscence  Treatment Modality: Other: Recreation Therapy  Interventions utilized were reminiscence  Purpose: feelings and other: increase socialization, increase attention, stimulate memory    Name: Madalyn Ricci YOB: 1940   MR: [id number]      Facilitator: Recreational Therapist  Level of Participation: active  Quality of Participation: appropriate/pleasant, attentive, and cooperative  Interactions with others: appropriate  Mood/Affect: appropriate  Triggers (if applicable): n/a  Cognition: coherent/clear  Progress: Moderate  Comments: pt problem is depressed mood.  Plan: continue with services

## 2023-11-07 NOTE — PROGRESS NOTES
Madalyn Ricci is a 83 y.o. female on day 21 of admission presenting with Mood disorder (CMS/HCC).Madalyn Ricci is a 83 y.o. female on day 21 of admission presenting with Mood disorder (CMS/HCC).Madalyn Ricci is a 83 y.o. female on day 21 of admission presenting with Mood disorder (CMS/HCC).      Patient is an 84yo female admitted for Mood Disorder.    Case discussed in morning team.    Vitals signs reviewed  Patient case was discussed in morning team.  She slept 8hs appetite is good, mood is good. Taking meds as ordered. No prns required. She is attending groups.  She is participating in PT/OT.  Social work is looking into discharge plan. [facility] was accepted and social work is attempting to find SNF placement.  Other daughter says patient can come to her for a night after check comes.(Social workis going toask daughter if she can stay with her until checks come as now don't expect a check unit 11/17/23) Patient is anticipating discharge after check arrives. She was interviewed in group room.    Past Medical History  She has a past medical history of Diabetes mellitus (CMS/HCC) and Hypertension.    Past Psychiatric History:   Previous therapy: no  Previous psychiatric treatment and medication trials: no  Previous psychiatric hospitalizations: no  Previous diagnoses: no  Previous suicide attempts: no  History of violence: no  Currently in treatment with N/A.  Depression screening was performed with standardized tool: Yes, Depression on admission    Surgical History  She has a past surgical history that includes Lung lobectomy (10/13/2015); Hysterectomy (10/13/2015); Cholecystectomy (10/13/2015); Appendectomy (10/13/2015); Hemicolectomy (10/13/2015); Cataract extraction (10/13/2015); and CT guided imaging for needle placement (2/4/2015).     Social History  She reports that she has never smoked. She has never used smokeless tobacco. No history on file for alcohol use and drug use.     Allergies  Patient  "has no known allergies.    Review of Systems    Psychiatric ROS - Adult  Anxiety: Mild  Depression: negative  Delirium: negative  Psychosis: negative  Margret: negative  Safety Issues: none  Psychiatric ROS Comment: The patient states that she is not suicidal,  and denies AVH. The patient states that she is not depressed and the only concern now is a caring and safe environment for her to live in.    Physical Exam      Mental Status Exam  General: NAD  Appearance: Hospital attire  Attitude: pleasant  Behavior: cooperative  Motor Activity: using walker  Speech: normal rate and volume  Mood: ok  Affect: more open  Thought Process: more organized  Thought Content: no delusions elicited, denies SI/HI  Thought Perception: denies AH/VH/paranoia  Cognition: fair  Insight: fair  Judgement: fair    Psychiatric Risk Assessment  Violence Risk Assessment: none  Acute Risk of Harm to Others is Considered: low   Suicide Risk Assessment: age > 65 yrs old, , chronic medical illness, history of trauma or abuse, and living alone or lack of social support  Protective Factors against Suicide: fear of suicide and Christian affiliation/spirituality  Acute Risk of Harm to Self is Considered: low    Last Recorded Vitals  Blood pressure (!) 128/93, pulse 84, temperature 36.1 °C (97 °F), temperature source Temporal, resp. rate 17, height 1.626 m (5' 4.02\"), weight 127 kg (278 lb 15.9 oz), SpO2 99 %.    Relevant Results      Current Facility-Administered Medications:     acetaminophen (Tylenol) tablet 650 mg, 650 mg, oral, q4h PRN, Navin Brooke DO, 650 mg at 10/29/23 0755    alum-mag hydroxide-simeth (Mylanta) 200-200-20 mg/5 mL oral suspension 30 mL, 30 mL, oral, q6h PRN, Navin Brooke DO    amLODIPine (Norvasc) tablet 10 mg, 10 mg, oral, Daily, Osiel Forman MD, 10 mg at 11/07/23 0813    atorvastatin (Lipitor) tablet 40 mg, 40 mg, oral, Nightly, Osiel Forman MD, 40 mg at 11/06/23 7344    carvedilol (Coreg) " tablet 25 mg, 25 mg, oral, BID with meals, Osiel Forman MD, 25 mg at 11/07/23 0813    dextrose 10 % in water (D10W) infusion, 0.3 g/kg/hr, intravenous, Once PRN, Osiel Forman MD    dextrose 50 % injection 25 g, 25 g, intravenous, q15 min PRN, Osiel Forman MD    diphenhydrAMINE (BENADryl) capsule 50 mg, 50 mg, oral, q6h PRN **OR** diphenhydrAMINE (BENADryl) injection 50 mg, 50 mg, intramuscular, Once PRN, Navin Brooke DO    glucagon (Glucagen) injection 1 mg, 1 mg, intramuscular, q15 min PRN, Osiel Forman MD    icosapent ethyL (Vascepa) capsule 1 g, 1 g, oral, BID with meals, Osiel Forman MD, 1 g at 11/07/23 0813    insulin glargine (Lantus) injection 40 Units, 40 Units, subcutaneous, Nightly, Osiel Forman MD, 40 Units at 11/06/23 2238    latanoprost (Xalatan) 0.005 % ophthalmic solution 1 drop, 1 drop, Both Eyes, Nightly, Osiel Forman MD, 1 drop at 11/06/23 2237    losartan (Cozaar) tablet 100 mg, 100 mg, oral, Daily, Osiel Forman MD, 100 mg at 11/07/23 0813    magnesium hydroxide (Milk of Magnesia) 400 mg/5 mL suspension 30 mL, 30 mL, oral, Daily PRN, Navin Brooke DO    magnesium oxide (Mag-Ox) tablet 400 mg, 400 mg, oral, Daily, Osiel Forman MD, 400 mg at 11/07/23 0813    OLANZapine (ZyPREXA) tablet 5 mg, 5 mg, oral, q6h PRN **OR** OLANZapine (ZyPREXA) injection 5 mg, 5 mg, intramuscular, q6h PRN, Navin Brooke DO    pantoprazole (ProtoNix) EC tablet 40 mg, 40 mg, oral, Daily before breakfast, Osiel Forman MD, 40 mg at 11/07/23 0813    traZODone (Desyrel) tablet 25 mg, 25 mg, oral, Nightly PRN, Navin Brooke DO    venlafaxine XR (Effexor-XR) 24 hr capsule 150 mg, 150 mg, oral, Daily, Navin Brooke DO, 150 mg at 11/07/23 0813      Assessment/Plan   Principal Problem:    Mood disorder   Active Problems:    Hyperlipidemia    GERD (gastroesophageal reflux disease)    Type 2 diabetes mellitus without complication, with  long-term current use of insulin (CMS/[facility])    Primary hypertension    Continue meds as ordered  Continue daily rounds.   Continue to encourage scheduled medication.  Encourage participation in group therapy- goes to every group.  Appreciate social work arranging placement in assisted living - patient will be discharged to daughters home when check comes and will go from there to assisted living. Social work is following progress and facilitating transfer to SNF an dwill contact daughter for discharge to [facility] since 11/3/23 check did not arrive yet and next check not due until 11/17/23.           Medication Consent  Medication Consent: risks, benefits, side effects reviewed for all ordered medications    I spent 30  minutes in the professional and overall care of this patient.                  I spent 25 minutes in the professional and overall care of this patient.      EMILIANA Escalante-Mari Ricci is a 83 y.o. female on day 21 of admission presenting with Mood disorder (CMS/[facility]).

## 2023-11-07 NOTE — PROGRESS NOTES
Physical Therapy     11/07/23 [id number]   PT  Visit   PT Received On 11/07/23   Response to Previous Treatment Patient with no complaints from previous session.   General   Reason for Referral impaired mobility   Referred By Dr. Brooke   Past Medical History Relevant to Rehab HTN, hyperlipidemia, GERD, DM, neuropathy   Preferred Learning Style auditory;verbal   General Comment pleasant, cooperative, motivated for PT   Precautions   Medical Precautions Fall precautions   Pain Assessment   Pain Assessment 0-10   Pain Score 0 - No pain   Cognition   Overall Cognitive Status WFL   Orientation Level Oriented X4   Therapeutic Exercise   Therapeutic Exercise Activity 1 chair push ups 1x15 reps, superviision   Therapeutic Activity   Therapeutic Activity 1 sit/stands 1x15 reps, arms crossed over chest, close supervision, vc's for eccentric control   Ambulation/Gait Training 1   Surface 1 Level tile   Device 1 Rolling walker   Assistance 1 Distant supervision   Quality of Gait 1 Wide base of support   Comments/Distance (ft) 1 100 ft x 4, increased lateral sway, but no instability observed.  Pt managed turns using safe technique.   Transfer 1   Technique 1 Sit to stand;Stand to sit   Transfer Level of Assistance 1 Independent;Distant supervision   Trials/Comments 1 vc's needed for hand placement   Activity Tolerance   Endurance Tolerates 10 - 20 min exercise with multiple rests   PT Assessment   PT Assessment Results Decreased strength;Decreased endurance;Impaired balance;Decreased mobility;Decreased safety awareness   Rehab Prognosis Good   Evaluation/Treatment Tolerance Patient tolerated treatment well   Strengths Ability to acquire knowledge   PT Plan   Inpatient/Swing Bed or Outpatient Inpatient   PT Plan   Treatment/Interventions Transfer training;Gait training;Bed mobility;Therapeutic exercise;Therapeutic activity   PT Plan Skilled PT   PT Frequency 4 times per week   PT Discharge Recommendations Low intensity level of  continued care   Equipment Recommended upon Discharge Wheeled walker   PT Recommended Transfer Status Stand by assist

## 2023-11-08 LAB
GLUCOSE BLD MANUAL STRIP-MCNC: 142 MG/DL (ref 74–99)
GLUCOSE BLD MANUAL STRIP-MCNC: 145 MG/DL (ref 74–99)
GLUCOSE BLD MANUAL STRIP-MCNC: 192 MG/DL (ref 74–99)
GLUCOSE BLD MANUAL STRIP-MCNC: 48 MG/DL (ref 74–99)
GLUCOSE BLD MANUAL STRIP-MCNC: 62 MG/DL (ref 74–99)
GLUCOSE BLD MANUAL STRIP-MCNC: 87 MG/DL (ref 74–99)

## 2023-11-08 PROCEDURE — 97110 THERAPEUTIC EXERCISES: CPT | Mod: GP

## 2023-11-08 PROCEDURE — 2500000001 HC RX 250 WO HCPCS SELF ADMINISTERED DRUGS (ALT 637 FOR MEDICARE OP): Performed by: PSYCHIATRY & NEUROLOGY

## 2023-11-08 PROCEDURE — 97116 GAIT TRAINING THERAPY: CPT | Mod: GP

## 2023-11-08 PROCEDURE — 99232 SBSQ HOSP IP/OBS MODERATE 35: CPT | Performed by: REGISTERED NURSE

## 2023-11-08 PROCEDURE — 1140000001 HC PRIVATE PSYCH ROOM DAILY

## 2023-11-08 PROCEDURE — 82947 ASSAY GLUCOSE BLOOD QUANT: CPT

## 2023-11-08 PROCEDURE — 2500000001 HC RX 250 WO HCPCS SELF ADMINISTERED DRUGS (ALT 637 FOR MEDICARE OP): Performed by: INTERNAL MEDICINE

## 2023-11-08 PROCEDURE — 2500000004 HC RX 250 GENERAL PHARMACY W/ HCPCS (ALT 636 FOR OP/ED): Performed by: INTERNAL MEDICINE

## 2023-11-08 RX ADMIN — ICOSAPENT ETHYL 1 G: 1 CAPSULE ORAL at 10:46

## 2023-11-08 RX ADMIN — Medication 400 MG: at 10:46

## 2023-11-08 RX ADMIN — LATANOPROST 1 DROP: 50 SOLUTION OPHTHALMIC at 20:47

## 2023-11-08 RX ADMIN — AMLODIPINE BESYLATE 10 MG: 10 TABLET ORAL at 10:46

## 2023-11-08 RX ADMIN — ICOSAPENT ETHYL 1 G: 1 CAPSULE ORAL at 17:08

## 2023-11-08 RX ADMIN — CARVEDILOL 25 MG: 12.5 TABLET, FILM COATED ORAL at 17:08

## 2023-11-08 RX ADMIN — PANTOPRAZOLE SODIUM 40 MG: 40 TABLET, DELAYED RELEASE ORAL at 10:46

## 2023-11-08 RX ADMIN — INSULIN GLARGINE 40 UNITS: 100 INJECTION, SOLUTION SUBCUTANEOUS at 20:47

## 2023-11-08 RX ADMIN — LOSARTAN POTASSIUM 100 MG: 50 TABLET, FILM COATED ORAL at 10:46

## 2023-11-08 RX ADMIN — CARVEDILOL 25 MG: 12.5 TABLET, FILM COATED ORAL at 10:46

## 2023-11-08 RX ADMIN — ATORVASTATIN CALCIUM 40 MG: 40 TABLET, FILM COATED ORAL at 20:47

## 2023-11-08 RX ADMIN — VENLAFAXINE HYDROCHLORIDE 150 MG: 150 CAPSULE, EXTENDED RELEASE ORAL at 10:46

## 2023-11-08 ASSESSMENT — PAIN - FUNCTIONAL ASSESSMENT
PAIN_FUNCTIONAL_ASSESSMENT: 0-10

## 2023-11-08 ASSESSMENT — COLUMBIA-SUICIDE SEVERITY RATING SCALE - C-SSRS
2. HAVE YOU ACTUALLY HAD ANY THOUGHTS OF KILLING YOURSELF?: NO
2. HAVE YOU ACTUALLY HAD ANY THOUGHTS OF KILLING YOURSELF?: NO
1. SINCE LAST CONTACT, HAVE YOU WISHED YOU WERE DEAD OR WISHED YOU COULD GO TO SLEEP AND NOT WAKE UP?: NO
6. HAVE YOU EVER DONE ANYTHING, STARTED TO DO ANYTHING, OR PREPARED TO DO ANYTHING TO END YOUR LIFE?: NO
6. HAVE YOU EVER DONE ANYTHING, STARTED TO DO ANYTHING, OR PREPARED TO DO ANYTHING TO END YOUR LIFE?: NO
1. SINCE LAST CONTACT, HAVE YOU WISHED YOU WERE DEAD OR WISHED YOU COULD GO TO SLEEP AND NOT WAKE UP?: NO

## 2023-11-08 ASSESSMENT — COGNITIVE AND FUNCTIONAL STATUS - GENERAL
MOBILITY SCORE: 22
TURNING FROM BACK TO SIDE WHILE IN FLAT BAD: A LITTLE
CLIMB 3 TO 5 STEPS WITH RAILING: A LITTLE

## 2023-11-08 ASSESSMENT — PAIN SCALES - GENERAL
PAINLEVEL_OUTOF10: 0 - NO PAIN

## 2023-11-08 NOTE — GROUP NOTE
"Group Topic: Other   Group Date: 11/8/2023  Start Time: 1030  End Time: 1045  Facilitators: JENNIFER Gramajo   Department: [facility] REHABTH VIRTUAL    Number of Participants: 1   Group Focus: check in, communication, and feeling awareness/expression  Treatment Modality: Other: Recreation therapy  Interventions utilized were exploration and mental fitness  Purpose: feelings    Name: Madalyn Ricci YOB: 1940   MR: [id number]      Facilitator: Recreational Therapist  Level of Participation: active  Quality of Participation: cooperative  Interactions with others: appropriate  Mood/Affect: appropriate  Triggers (if applicable): n/a  Cognition: coherent/clear  Progress: Moderate  Comments: pt problem is depressed mood.  Pt is resting in bed at this time.  Had low blood sugar earlier and is \"trying to rest from that.  It made me feel all weird\".  Pt is pleasant, cooperative and appropriate with eye contact and behaviors.  Pt reports she is \"looking forward to the afternoon group\".    Plan: continue with services      "

## 2023-11-08 NOTE — GROUP NOTE
Group Topic: Other   Group Date: 11/8/2023  Start Time: 1510  End Time: 1600  Facilitators: JENNIFER Gramajo   Department: [facility] REHABTH VIRTUAL    Number of Participants: 5   Group Focus: other [employer] Game  Treatment Modality: Other: Recreation therapy  Interventions utilized were mental fitness  Purpose: other: fun, enhance self esteem, increase stimulation, increase alertness, express feelings, elevate mood, stimulate memory, increase socialization    Name: Madalyn Ricci YOB: 1940   MR: [id number]      Facilitator: Recreational Therapist  Level of Participation: active  Quality of Participation: appropriate/pleasant, attentive, cooperative, and engaged  Interactions with others: appropriate and gave feedback  Mood/Affect: appropriate  Triggers (if applicable): n/a  Cognition: coherent/clear  Progress: Moderate  Comments: pt problem is depressed mood.  Plan: continue with services

## 2023-11-08 NOTE — NURSING NOTE
BIRP NOTE     Problem:  depression     Behavior:  Denies any depression  Group Participation: refused am group, went to 2nd group  Appetite/Meals: 100x3       Interventions:  Advised to seek staff with any concerns    Response:  States she understands to seek staff with any issues     Plan:  Will continue to monitor

## 2023-11-08 NOTE — PROGRESS NOTES
Madalyn Ricci is a 83 y.o. female on day 22 of admission presenting with Mood disorder (CMS/HCC).Madalyn Ricci is a 83 y.o. female on day 22 of admission presenting with Mood disorder (CMS/HCC).Madalyn Ricci is a 83 y.o. female on day 22 of admission presenting with Mood disorder (CMS/HCC).      Patient is an 82yo female admitted for Mood Disorder.    Case discussed in morning team.    Vitals signs reviewed  Patient case was discussed in morning team.  She slept 8hs appetite is good, mood is good. Taking meds as ordered. No prns required. She is attending groups.  She is participating in PT/OT.  Social work is looking into discharge plan. [facility] was accepted and social work is attempting to find SNF placement. Patient is anticipating discharge since check has arrived. She was interviewed in group room.    Past Medical History  She has a past medical history of Diabetes mellitus (CMS/[facility]) and Hypertension.    Past Psychiatric History:   Previous therapy: no  Previous psychiatric treatment and medication trials: no  Previous psychiatric hospitalizations: no  Previous diagnoses: no  Previous suicide attempts: no  History of violence: no  Currently in treatment with N/A.  Depression screening was performed with standardized tool: Yes, Depression on admission    Surgical History  She has a past surgical history that includes Lung lobectomy (10/13/2015); Hysterectomy (10/13/2015); Cholecystectomy (10/13/2015); Appendectomy (10/13/2015); Hemicolectomy (10/13/2015); Cataract extraction (10/13/2015); and CT guided imaging for needle placement (2/4/2015).     Social History  She reports that she has never smoked. She has never used smokeless tobacco. No history on file for alcohol use and drug use.     Allergies  Patient has no known allergies.    Review of Systems    Psychiatric ROS - Adult  Anxiety: Mild  Depression: negative  Delirium: negative  Psychosis: negative  Margret: negative  Safety Issues:  "none  Psychiatric ROS Comment: The patient states that she is not suicidal,  and denies AVH. The patient states that she is not depressed and the only concern now is a caring and safe environment for her to live in.    Physical Exam      Mental Status Exam  General: NAD  Appearance: Hospital attire  Attitude: pleasant  Behavior: cooperative  Motor Activity: using walker  Speech: normal rate and volume  Mood: ok  Affect: more open  Thought Process: more organized  Thought Content: no delusions elicited, denies SI/HI  Thought Perception: denies AH/VH/paranoia  Cognition: fair  Insight: fair  Judgement: fair    Psychiatric Risk Assessment  Violence Risk Assessment: none  Acute Risk of Harm to Others is Considered: low   Suicide Risk Assessment: age > 65 yrs old, , chronic medical illness, history of trauma or abuse, and living alone or lack of social support  Protective Factors against Suicide: fear of suicide and Mandaeism affiliation/spirituality  Acute Risk of Harm to Self is Considered: low    Last Recorded Vitals  Blood pressure (!) 128/93, pulse 84, temperature 36.1 °C (97 °F), temperature source Temporal, resp. rate 17, height 1.626 m (5' 4.02\"), weight 127 kg (278 lb 15.9 oz), SpO2 99 %.    Relevant Results      Current Facility-Administered Medications:     acetaminophen (Tylenol) tablet 650 mg, 650 mg, oral, q4h PRN, Navin Brooke DO, 650 mg at 10/29/23 0755    alum-mag hydroxide-simeth (Mylanta) 200-200-20 mg/5 mL oral suspension 30 mL, 30 mL, oral, q6h PRN, Navin Brooke DO    amLODIPine (Norvasc) tablet 10 mg, 10 mg, oral, Daily, Osiel Forman MD, 10 mg at 11/08/23 1046    atorvastatin (Lipitor) tablet 40 mg, 40 mg, oral, Nightly, Osiel Forman MD, 40 mg at 11/07/23 2101    carvedilol (Coreg) tablet 25 mg, 25 mg, oral, BID with meals, Osiel Forman MD, 25 mg at 11/08/23 1046    dextrose 10 % in water (D10W) infusion, 0.3 g/kg/hr, intravenous, Once PRN, Osiel LAGUNA" MD Dickson    dextrose 50 % injection 25 g, 25 g, intravenous, q15 min PRN, Osiel Forman MD    diphenhydrAMINE (BENADryl) capsule 50 mg, 50 mg, oral, q6h PRN **OR** diphenhydrAMINE (BENADryl) injection 50 mg, 50 mg, intramuscular, Once PRN, aNvin Brooke DO    glucagon (Glucagen) injection 1 mg, 1 mg, intramuscular, q15 min PRN, Osiel Forman MD    icosapent ethyL (Vascepa) capsule 1 g, 1 g, oral, BID with meals, Osiel Forman MD, 1 g at 11/08/23 1046    insulin glargine (Lantus) injection 40 Units, 40 Units, subcutaneous, Nightly, Osiel Forman MD, 40 Units at 11/07/23 2102    latanoprost (Xalatan) 0.005 % ophthalmic solution 1 drop, 1 drop, Both Eyes, Nightly, Osiel Forman MD, 1 drop at 11/07/23 2101    losartan (Cozaar) tablet 100 mg, 100 mg, oral, Daily, Osiel Forman MD, 100 mg at 11/08/23 1046    magnesium hydroxide (Milk of Magnesia) 400 mg/5 mL suspension 30 mL, 30 mL, oral, Daily PRN, Navin Brooke DO    magnesium oxide (Mag-Ox) tablet 400 mg, 400 mg, oral, Daily, Osiel Forman MD, 400 mg at 11/08/23 1046    OLANZapine (ZyPREXA) tablet 5 mg, 5 mg, oral, q6h PRN **OR** OLANZapine (ZyPREXA) injection 5 mg, 5 mg, intramuscular, q6h PRN, Navin Brooke DO    pantoprazole (ProtoNix) EC tablet 40 mg, 40 mg, oral, Daily before breakfast, Osiel Forman MD, 40 mg at 11/08/23 1046    traZODone (Desyrel) tablet 25 mg, 25 mg, oral, Nightly PRN, Navin Brooke DO    venlafaxine XR (Effexor-XR) 24 hr capsule 150 mg, 150 mg, oral, Daily, Navin WILLARD DO Edwardo, 150 mg at 11/08/23 1046      Assessment/Plan   Principal Problem:    Mood disorder   Active Problems:    Hyperlipidemia    GERD (gastroesophageal reflux disease)    Type 2 diabetes mellitus without complication, with long-term current use of insulin (CMS/[facility])    Primary hypertension    Continue meds as ordered  Continue daily rounds.   Continue to encourage scheduled medication.  Encourage  participation in group therapy- goes to every group.  Appreciate social work arranging placement in assisted living - patient will be discharged in the next two days since she met with [facility] to set up account and will be able to pay for assisted living.           Medication Consent  Medication Consent: risks, benefits, side effects reviewed for all ordered medications    I spent 30  minutes in the professional and overall care of this patient.                  I spent 25 minutes in the professional and overall care of this patient.      EMILIANA Escalante-CNSMadalyn Ricci is a 83 y.o. female on day 22 of admission presenting with Mood disorder (CMS/[facility]).Madalyn Ricci is a 83 y.o. female on day 22 of admission presenting with Mood disorder (CMS/[facility]).

## 2023-11-08 NOTE — PROGRESS NOTES
Nutrition Follow up Note    No changes. Pt eating well.     Lab Results   Component Value Date    WBC 6.4 10/16/2023    HGB 14.1 10/16/2023    HCT 44.1 10/16/2023     10/16/2023    CHOL 138 06/26/2020    TRIG 291 (H) 06/26/2020    HDL 36 (L) 06/26/2020    ALT 11 10/16/2023    AST 16 10/16/2023     10/16/2023    K 3.7 10/16/2023     (H) 10/16/2023    CREATININE 1.20 10/16/2023    BUN 31 (H) 10/16/2023    CO2 26 10/16/2023    TSH 0.66 10/09/2020    INR 1.0 04/06/2018    HGBA1C 6.4 (H) 12/12/2020    ALBUR 55 (H) 06/26/2020       Current Facility-Administered Medications:     acetaminophen (Tylenol) tablet 650 mg, 650 mg, oral, q4h PRN, Navin Brooke DO, 650 mg at 10/29/23 0755    alum-mag hydroxide-simeth (Mylanta) 200-200-20 mg/5 mL oral suspension 30 mL, 30 mL, oral, q6h PRN, Navin Brooke DO    amLODIPine (Norvasc) tablet 10 mg, 10 mg, oral, Daily, Osiel Forman MD, 10 mg at 11/08/23 1046    atorvastatin (Lipitor) tablet 40 mg, 40 mg, oral, Nightly, Osiel Forman MD, 40 mg at 11/07/23 2101    carvedilol (Coreg) tablet 25 mg, 25 mg, oral, BID with meals, Osiel Forman MD, 25 mg at 11/08/23 1046    dextrose 10 % in water (D10W) infusion, 0.3 g/kg/hr, intravenous, Once PRN, Osiel Forman MD    dextrose 50 % injection 25 g, 25 g, intravenous, q15 min PRN, Osiel Forman MD    diphenhydrAMINE (BENADryl) capsule 50 mg, 50 mg, oral, q6h PRN **OR** diphenhydrAMINE (BENADryl) injection 50 mg, 50 mg, intramuscular, Once PRN, Navin Brooke DO    glucagon (Glucagen) injection 1 mg, 1 mg, intramuscular, q15 min PRN, Osiel Forman MD    icosapent ethyL (Vascepa) capsule 1 g, 1 g, oral, BID with meals, Osiel Forman MD, 1 g at 11/08/23 1046    insulin glargine (Lantus) injection 40 Units, 40 Units, subcutaneous, Nightly, Osiel Forman MD, 40 Units at 11/07/23 2102    latanoprost (Xalatan) 0.005 % ophthalmic solution 1 drop, 1 drop, Both Eyes, Nightly,  "Osiel Forman MD, 1 drop at 11/07/23 2101    losartan (Cozaar) tablet 100 mg, 100 mg, oral, Daily, Osiel Forman MD, 100 mg at 11/08/23 1046    magnesium hydroxide (Milk of Magnesia) 400 mg/5 mL suspension 30 mL, 30 mL, oral, Daily PRN, Navin Brooke DO    magnesium oxide (Mag-Ox) tablet 400 mg, 400 mg, oral, Daily, Osiel Forman MD, 400 mg at 11/08/23 1046    OLANZapine (ZyPREXA) tablet 5 mg, 5 mg, oral, q6h PRN **OR** OLANZapine (ZyPREXA) injection 5 mg, 5 mg, intramuscular, q6h PRN, Navin Brooke DO    pantoprazole (ProtoNix) EC tablet 40 mg, 40 mg, oral, Daily before breakfast, Osiel Forman MD, 40 mg at 11/08/23 1046    traZODone (Desyrel) tablet 25 mg, 25 mg, oral, Nightly PRN, Navin Brooke DO    venlafaxine XR (Effexor-XR) 24 hr capsule 150 mg, 150 mg, oral, Daily, Navin Brooke DO, 150 mg at 11/08/23 1046    Dietary Orders (From admission, onward)       Start     Ordered    10/17/23 1405  Adult diet Carb Controlled; 60 gram carb/meal, 30 gram Carb evening snack; 2 - 3 gm Sodium, 70 gm fat  Diet effective now        Question Answer Comment   Diet type Carb Controlled    Carb diet selection: 60 gram carb/meal, 30 gram Carb evening snack    Other restriction(s): 2 - 3 gm Sodium    Other restriction(s): 70 gm fat        10/17/23 1409                  Food and Nutrient History  Energy Intake: Good > 75 %    Anthropometrics:  Ht: 162.6 cm (5' 4.02\"), Wt: 127 kg (278 lb 15.9 oz), BMI: 47.87    Weight Change  Weight History / % Weight Change: no updated wt since 10/18/23    IBW/kg (Dietitian Calculated): 54.55 kg  Adjusted Body Weight (kg): 72.73 kg    Estimated Energy Needs  Total Energy Estimated Needs (kCal): 1815 kCal  Total Estimated Energy Need per Day (kCal/kg): 25 kCal/kg  Method for Estimating Needs: adjusted wt    Estimated Protein Needs  Total Protein Estimated Needs (g): 73 g  Total Protein Estimated Needs (g/kg): 1 g/kg  Method for Estimating Needs: adjusted " wt    Estimated Fluid Needs  Total Fluid Estimated Needs (mL): 1815 mL  Total Fluid Estimated Needs (mL/kg): 25 mL/kg  Method for Estimating Needs: adjusted wt    Nutrition Diagnosis: None at this time.    Nutrition Interventions/Recommendations: None at this time.    Education Documentation  No documentation found.        Nutrition Monitoring/Evaluation: None at this time.    RD Recommendations: None at this time.      Follow Up  Time Spent (min): 10 minutes  Last Date of Nutrition Visit: 11/08/23  Nutrition Follow-Up Needed?: 7-10 days  Follow up Comment: 11/17/23

## 2023-11-08 NOTE — GROUP NOTE
Group Topic: Other   Group Date: 11/8/2023  Start Time: 1100  End Time: 1150  Facilitators: JENNIFER Gramajo   Department: [facility] REHABTH VIRTUAL    Number of Participants: 4   Group Focus: reminiscence  Treatment Modality: Other: Recreation Therapy  Interventions utilized were reminiscence  Purpose: feelings and other: increase stimulation, elevate mood, stimulate memory, express feelings    Name: Madalyn Ricci YOB: 1940   MR: [id number]      Facilitator: Recreational Therapist  Level of Participation: active  Quality of Participation: appropriate/pleasant, attentive, and cooperative  Interactions with others: appropriate  Mood/Affect: appropriate  Triggers (if applicable): n/a  Cognition: coherent/clear  Progress: Moderate  Comments: pt problem is depressed mood.  Plan: continue with services

## 2023-11-08 NOTE — PROGRESS NOTES
Physical Therapy       11/08/23 [id number]   PT  Visit   PT Received On 11/08/23   Response to Previous Treatment Patient with no complaints from previous session.   General   Reason for Referral impaired mobility   Referred By Dr. Brooke   Past Medical History Relevant to Rehab HTN, hyperlipidemia, GERD, DM, neuropathy   Patient Position Received Up in chair   Preferred Learning Style auditory;verbal   General Comment pleasant, cooperative, motivated for PT   Precautions   Medical Precautions Fall precautions   Cognition   Overall Cognitive Status WFL   Orientation Level Oriented X4   RUE    RUE  WFL   LUE    LUE WFL   RLE    RLE  WFL   LLE    LLE  WFL   Therapeutic Exercise   Therapeutic Exercise Performed Yes   Therapeutic Exercise Activity 1 Standing Heel Raises 2x15 RW   Therapeutic Exercise Activity 2 LAQ 2x15 2# BLE   Therapeutic Exercise Activity 3 seated hamstring curls with green theraband 2 x 15   Therapeutic Exercise Activity 4 seated hip add/ball squeezes 2 x 15   Therapeutic Exercise Activity 5 seated hip abd with green theraband 2 x 15   Therapeutic Exercise Activity 6 chair push ups, 1x10 reps, Tracy   Therapeutic Exercise Activity 7 Mini squats x 10 reps   Ambulation/Gait Training 1   Surface 1 Level tile   Device 1 Cook rail  (To mimic 1 UE support similiar to [place])   Assistance 1 Contact guard   Quality of Gait 1 Wide base of support   Comments/Distance (ft) 1 Short section without HR, unsteady increased lateral sway CGX1.  75 x2   Transfer 1   Transfer From 1 Sit to   Transfer to 1 Stand   Technique 1 Sit to stand;Stand to sit   Transfer Device 1 Walker   Transfer Level of Assistance 1 Independent   Transfers 2   Technique 2 Sit to stand;Stand to sit   Transfer Device 2 Walker   Transfer Level of Assistance 2 Independent   Wheelchair Activities   Description/Details 1   (Promary mode locomotion Ambulation.  No w/c)   Activity Tolerance   Endurance Tolerates 10 - 20 min exercise with multiple  rests   PT Assessment   PT Assessment Results Decreased strength;Decreased endurance;Impaired balance;Decreased mobility;Decreased safety awareness   Rehab Prognosis Good   Evaluation/Treatment Tolerance Patient tolerated treatment well   Strengths Ability to acquire knowledge   End of Session Communication Bedside nurse   Assessment Comment Motivated in therapy and displays good effort. Safety cues required for mobility tasks.   End of Session Patient Position Up in chair   Education   Individual(s) Educated Patient   Education Provided Fall Risk   Education Comment Education provided re: safe mobility techniques and to decrease risk for falls   PT Plan   Inpatient/Swing Bed or Outpatient Inpatient   PT Plan   PT Plan Skilled PT   Equipment Recommended upon Discharge Wheeled walker   PT Recommended Transfer Status Stand by assist      11/08/23 1400   [facility] Basic Mobility   Turning from your back to your side while in a flat bed without using bedrails 4   Moving from lying on your back to sitting on the side of a flat bed without using bedrails 3   Moving to and from bed to chair (including a wheelchair) 4   Standing up from a chair using your arms (e.g. wheelchair or bedside chair) 4   To walk in hospital room 4   Climbing 3-5 steps with railing 3   Basic Mobility - Total Score 22

## 2023-11-08 NOTE — PROGRESS NOTES
CARENW-S received a VM this morning stating that [facility] was unable to come on site to help an individual set up a bank account at this time. LISW-S discussed concerns with treatment team. Treatment team recommended that we speak with AL today when they come out to visit to see what they can do to help with this situation. Pt and CARENW-S have a meeting with AL at 12:50 p and will be able to share finical concerns with them during their meeting.

## 2023-11-08 NOTE — CARE PLAN
The patient's goals for the shift include No falls    The clinical goals for the shift include No falls    Over the shift, the patient did  make progress toward the following goals. Barriers to progression include use a wheelchair. Recommendations to address these barriers include use a wheelchair.

## 2023-11-09 LAB
GLUCOSE BLD MANUAL STRIP-MCNC: 131 MG/DL (ref 74–99)
GLUCOSE BLD MANUAL STRIP-MCNC: 134 MG/DL (ref 74–99)
GLUCOSE BLD MANUAL STRIP-MCNC: 208 MG/DL (ref 74–99)
GLUCOSE BLD MANUAL STRIP-MCNC: 65 MG/DL (ref 74–99)
GLUCOSE BLD MANUAL STRIP-MCNC: 93 MG/DL (ref 74–99)

## 2023-11-09 PROCEDURE — 2500000004 HC RX 250 GENERAL PHARMACY W/ HCPCS (ALT 636 FOR OP/ED): Performed by: INTERNAL MEDICINE

## 2023-11-09 PROCEDURE — 99232 SBSQ HOSP IP/OBS MODERATE 35: CPT | Performed by: INTERNAL MEDICINE

## 2023-11-09 PROCEDURE — 1140000001 HC PRIVATE PSYCH ROOM DAILY

## 2023-11-09 PROCEDURE — 82947 ASSAY GLUCOSE BLOOD QUANT: CPT

## 2023-11-09 PROCEDURE — 2500000001 HC RX 250 WO HCPCS SELF ADMINISTERED DRUGS (ALT 637 FOR MEDICARE OP): Performed by: INTERNAL MEDICINE

## 2023-11-09 PROCEDURE — 99232 SBSQ HOSP IP/OBS MODERATE 35: CPT | Performed by: REGISTERED NURSE

## 2023-11-09 PROCEDURE — 2500000001 HC RX 250 WO HCPCS SELF ADMINISTERED DRUGS (ALT 637 FOR MEDICARE OP): Performed by: PSYCHIATRY & NEUROLOGY

## 2023-11-09 RX ORDER — INSULIN GLARGINE 100 [IU]/ML
35 INJECTION, SOLUTION SUBCUTANEOUS NIGHTLY
Status: DISCONTINUED | OUTPATIENT
Start: 2023-11-09 | End: 2023-11-15 | Stop reason: HOSPADM

## 2023-11-09 RX ADMIN — Medication 400 MG: at 08:29

## 2023-11-09 RX ADMIN — ATORVASTATIN CALCIUM 40 MG: 40 TABLET, FILM COATED ORAL at 20:34

## 2023-11-09 RX ADMIN — LOSARTAN POTASSIUM 100 MG: 50 TABLET, FILM COATED ORAL at 08:29

## 2023-11-09 RX ADMIN — ICOSAPENT ETHYL 1 G: 1 CAPSULE ORAL at 08:29

## 2023-11-09 RX ADMIN — LATANOPROST 1 DROP: 50 SOLUTION OPHTHALMIC at 20:35

## 2023-11-09 RX ADMIN — PANTOPRAZOLE SODIUM 40 MG: 40 TABLET, DELAYED RELEASE ORAL at 06:59

## 2023-11-09 RX ADMIN — CARVEDILOL 25 MG: 12.5 TABLET, FILM COATED ORAL at 17:16

## 2023-11-09 RX ADMIN — ICOSAPENT ETHYL 1 G: 1 CAPSULE ORAL at 17:16

## 2023-11-09 RX ADMIN — AMLODIPINE BESYLATE 10 MG: 10 TABLET ORAL at 08:29

## 2023-11-09 RX ADMIN — INSULIN GLARGINE 35 UNITS: 100 INJECTION, SOLUTION SUBCUTANEOUS at 20:35

## 2023-11-09 RX ADMIN — CARVEDILOL 25 MG: 12.5 TABLET, FILM COATED ORAL at 08:29

## 2023-11-09 RX ADMIN — VENLAFAXINE HYDROCHLORIDE 150 MG: 150 CAPSULE, EXTENDED RELEASE ORAL at 08:29

## 2023-11-09 ASSESSMENT — PAIN - FUNCTIONAL ASSESSMENT
PAIN_FUNCTIONAL_ASSESSMENT: 0-10

## 2023-11-09 ASSESSMENT — COLUMBIA-SUICIDE SEVERITY RATING SCALE - C-SSRS
1. SINCE LAST CONTACT, HAVE YOU WISHED YOU WERE DEAD OR WISHED YOU COULD GO TO SLEEP AND NOT WAKE UP?: NO
6. HAVE YOU EVER DONE ANYTHING, STARTED TO DO ANYTHING, OR PREPARED TO DO ANYTHING TO END YOUR LIFE?: NO
2. HAVE YOU ACTUALLY HAD ANY THOUGHTS OF KILLING YOURSELF?: NO

## 2023-11-09 ASSESSMENT — PAIN SCALES - GENERAL
PAINLEVEL_OUTOF10: 0 - NO PAIN

## 2023-11-09 NOTE — PROGRESS NOTES
[facility]: [facility]  PROGRESS NOTE      Madalyn Ricci is a 83 y.o. female that is being seen  today for follow-up at [facility].  No chief complaint on file..  Subjective   Patient is being seen for follow-up of [facility].  Patient's blood pressure has been better today,   No recent falls or episodes of agitation.  Patient is usually in the wheelchair since she has difficulty with walking.  Denies any other symptoms.    Patient has been having low blood sugars in the morning.  Patient has been advised to take some snack at nighttime.  During the morning time her sugars have been higher.  We will decrease dose of Lantus at night.  ROS  Negative for fever or chills  Negative for sore throat, ear pain, nasal discharge  Negative for cough, shortness of breath or wheezing  Negative for chest pain, palpitations, swelling of legs  Negative for abdominal pain, constipation, diarrhea, blood in the stools  Negative for urinary complaints  Negative for headache, dizziness, weakness or numbness  Negative for joint pain.  Positive for difficulty in walking  Positive for anxiety  All other systems reviewed and were negative   Vitals:    11/09/23 0829   BP:    Pulse: 84   Resp:    Temp:    SpO2:         Physical Exam  Constitutional: Patient does not appear to be in any acute distress  Head and Face: NCAT  Eyes: Normal external exam, EOMI  ENT: Normal external inspection of ears and nose. Oropharynx normal.  Cardiovascular: RRR, S1/S2, no murmurs, rubs, or gallops, radial pulses +2, no edema of extremities  Pulmonary: CTAB, no respiratory distress.  Abdomen: +BS, soft, non-tender, nondistended, no guarding or rebound, no masses noted  MSK: No joint swelling, normal movements of all extremities. Range of motion- normal.  Skin- No lesions, contusions, or erythema.  Peripheral puslses palpable bilaterally 2+  Neuro: AAO X3, Cranial nerves 2-12 grossly intact,DTR 2+ in all 4 limbs   Psychiatric: Judgment  intact. Appropriate mood and behavior    Diagnostic Results   Lab Results   Component Value Date    GLUCOSE 199 (H) 10/16/2023    CALCIUM 9.3 10/16/2023     10/16/2023    K 3.7 10/16/2023    CO2 26 10/16/2023     (H) 10/16/2023    BUN 31 (H) 10/16/2023    CREATININE 1.20 10/16/2023     Lab Results   Component Value Date    ALT 11 10/16/2023    AST 16 10/16/2023    ALKPHOS 74 10/16/2023    BILITOT 0.3 10/16/2023     Lab Results   Component Value Date    WBC 6.4 10/16/2023    HGB 14.1 10/16/2023    HCT 44.1 10/16/2023    MCV 90 10/16/2023     10/16/2023     Lab Results   Component Value Date    CHOL 138 06/26/2020    CHOL 142 09/20/2019    CHOL 131 (L) 03/05/2019     Lab Results   Component Value Date    HDL 36 (L) 06/26/2020    HDL 36 (L) 09/20/2019    HDL 37 (L) 03/05/2019     Lab Results   Component Value Date    LDLCALC 44 (L) 06/26/2020    LDLCALC 36 (L) 09/20/2019    LDLCALC 49 (L) 03/05/2019     Lab Results   Component Value Date    TRIG 291 (H) 06/26/2020    TRIG 350 (H) 09/20/2019    TRIG 224 (H) 03/05/2019     Lab Results   Component Value Date    HGBA1C 6.4 (H) 12/12/2020     Other labs not included in the list above were reviewed either before or during this encounter.    History    Past Medical History:   Diagnosis Date    Diabetes mellitus (CMS/HCC)     Hypertension      Past Surgical History:   Procedure Laterality Date    APPENDECTOMY  10/13/2015    Appendectomy    CATARACT EXTRACTION  10/13/2015    Cataract Surgery    CHOLECYSTECTOMY  10/13/2015    Cholecystectomy    CT GUIDED IMAGING FOR NEEDLE PLACEMENT  2/4/2015    CT GUIDED IMAGING FOR NEEDLE PLACEMENT [facility]    HEMICOLECTOMY  10/13/2015    Hemicolectomy    HYSTERECTOMY  10/13/2015    Hysterectomy    LUNG LOBECTOMY  10/13/2015    Lung Lobectomy     No family history on file.  No Known Allergies  No current facility-administered medications on file prior to encounter.     Current Outpatient Medications on File Prior  "to Encounter   Medication Sig Dispense Refill    amLODIPine (Norvasc) 10 mg tablet Take 1 tablet (10 mg) by mouth once daily.      atorvastatin (Lipitor) 40 mg tablet Take 1 tablet (40 mg) by mouth once daily.      carvedilol (Coreg) 25 mg tablet Take 1 tablet (25 mg) by mouth 2 times a day with meals.      [] cefdinir (Omnicef) 300 mg capsule Take 1 capsule (300 mg) by mouth 2 times a day for 7 days. 14 capsule 0    icosapent ethyL (Vascepa) 1 gram capsule Take 1 capsule (1 g) by mouth 2 times a day with meals.      insulin aspart (NovoLOG U-100 Insulin aspart) 100 unit/mL injection Inject 100 Units under the skin 3 times a day before meals. Take as directed per insulin instructions.      insulin degludec (Tresiba FlexTouch U-200) 200 unit/mL (3 mL) injection Inject 40 Units under the skin once daily at bedtime. Take as directed per insulin instructions.      latanoprost (Xalatan) 0.005 % ophthalmic solution Administer 1 drop into both eyes once daily at bedtime.      losartan (Cozaar) 50 mg tablet Take 2 tablets (100 mg) by mouth once daily.      magnesium oxide (Mag-Ox) 400 mg tablet Take 1 tablet (400 mg) by mouth once daily.      omeprazole (PriLOSEC) 40 mg DR capsule Take 1 capsule (40 mg) by mouth once daily in the morning. Take before meals. Do not crush or chew.      pen needle, diabetic (BD Traci 2nd Gen Pen Needle) 32 gauge x 5/32\" needle       potassium chloride ER (Micro-K) 10 mEq ER capsule Take 2 capsules (20 mEq) by mouth once daily. Do not crush or chew.      venlafaxine XR (Effexor-XR) 150 mg 24 hr capsule Take 1 capsule (150 mg) by mouth once daily. Do not crush or chew.      [DISCONTINUED] ferrous sulfate 325 (65 Fe) MG EC tablet Take 65 mg by mouth 3 times a day with meals. Do not crush, chew, or split.      [DISCONTINUED] fluticasone propion-salmeteroL (Advair) 115-21 mcg/actuation inhaler Inhale 2 puffs 2 times a day. Rinse mouth with water after use to reduce aftertaste and incidence of " candidiasis. Do not swallow.      [DISCONTINUED] hydroCHLOROthiazide (HYDRODiuril) 50 mg tablet Take 1 tablet (50 mg) by mouth once daily.      [DISCONTINUED] vitamin E 450 mg (1000 unit) capsule Take 100 Units by mouth once daily.         There is no immunization history on file for this patient.  Patient's medical history was reviewed and updated either before or during this encounter.  ASSESSMENT / PLAN:  Active Hospital Problems    Hyperlipidemia      GERD (gastroesophageal reflux disease)      Type 2 diabetes mellitus without complication, with long-term current use of insulin (CMS/[facility])      Primary hypertension      *Mood disorder (CMS/[facility])    Patient's blood pressure is better .  We will monitor.  Blood sugars have been low in the morning.  We will decrease the dose of Lantus.   denies any significant complaints.  Patient is on statins.  Patient is being seen by geropsych team.        Osiel Forman MD

## 2023-11-09 NOTE — NURSING NOTE
BIRP NOTE     Problem:  SI, depression      Behavior:  Patient is pleasant and cooperative for assessment and medication administration. Makes needs known. Patient is social with staff and peers; interacts appropriately. Patient denies SI.   Group Participation: Attends both groups  Appetite/Meals: 100-100-100       Interventions:  Medications administered per orders.     Response:  Medication compliant.      Plan:  Maintain q 15 minute rounds for patient safety. Will continue to monitor behaviors and effectiveness of interventions. Continue current plan of care.

## 2023-11-09 NOTE — NURSING NOTE
WISAM NOTE     Problem:  SI     Behavior:  Pt sitting in group room watching tv at start of shift. Pleasant, behavior in control. Denies SI, plan, intent this shift. Compliant with meds, cooperative with care.  Group Participation: n/a  Appetite/Meals: HS snack provided     Interventions:  1:1 provided. Evening meds given per orders. Encouraged to make needs known.    Response:  Pt sat in bed reading before turning out light. Resting in bed at this time.      Plan:  Will continue to monitor behaviors and effectiveness of interventions while maintaining pt safety.

## 2023-11-09 NOTE — GROUP NOTE
Group Topic: Self Esteem   Group Date: 11/9/2023  Start Time: 1000  End Time: 1040  Facilitators: PRIMITIVO Santoyo   Department: [facility]    Number of Participants: 3   Group Focus: self-esteem/Positive Affirmations  Treatment Modality: Other: Social Work   Interventions utilized were mental fitness  Purpose: coping skills, feelings, self-worth, and self-care    Name: Madalyn Ricci YOB: 1940   MR: [id number]      Facilitator:   Level of Participation: moderate  Quality of Participation: appropriate/pleasant, cooperative, and engaged  Interactions with others: appropriate  Mood/Affect: appropriate  Triggers (if applicable): n/a  Cognition: coherent/clear  Progress: Moderate  Comments: Pt problem is mood disorder.   Plan: continue with services

## 2023-11-09 NOTE — GROUP NOTE
Group Topic: Other   Group Date: 11/9/2023  Start Time: 1500  End Time: 1600  Facilitators: JENNIFER Gramajo   Department: [facility]    Number of Participants: 4   Group Focus: other [place] Game  Treatment Modality: Other: Recreation therapy  Interventions utilized were group exercise and mental fitness  Purpose: other: increase attention, increase socialization, elevate mood, , stimulate memory, fun    Name: Madalyn Ricci YOB: 1940   MR: [id number]      Facilitator: Recreational Therapist  Level of Participation: active  Quality of Participation: appropriate/pleasant, attentive, cooperative, and engaged  Interactions with others: appropriate and gave feedback  Mood/Affect: appropriate  Triggers (if applicable): n/a  Cognition: coherent/clear  Progress: Significant  Comments: pt problem is depressed mood.  Plan: continue with services

## 2023-11-09 NOTE — PROGRESS NOTES
Madalyn Ricci is a 83 y.o. female on day 23 of admission presenting with Mood disorder (CMS/HCC).Madalyn Ricci is a 83 y.o. female on day 23 of admission presenting with Mood disorder (CMS/HCC).Madalyn Ricci is a 83 y.o. female on day 23 of admission presenting with Mood disorder (CMS/HCC).      Patient is an 82yo female admitted for Mood Disorder.    Case discussed in morning team.    Vitals signs reviewed  Patient case was discussed in morning team.  She slept 8hs appetite is good, mood is good. Taking meds as ordered. No prns required. She is attending groups.  She is participating in PT/OT.  Social work is looking into discharge plan. [facility] was accepted and social work is attempting to find SNF placement. Patient is anticipating discharge tomorrow. She was interviewed in group room.    Past Medical History  She has a past medical history of Diabetes mellitus (CMS/[facility]) and Hypertension.    Past Psychiatric History:   Previous therapy: no  Previous psychiatric treatment and medication trials: no  Previous psychiatric hospitalizations: no  Previous diagnoses: no  Previous suicide attempts: no  History of violence: no  Currently in treatment with N/A.  Depression screening was performed with standardized tool: Yes, Depression on admission    Surgical History  She has a past surgical history that includes Lung lobectomy (10/13/2015); Hysterectomy (10/13/2015); Cholecystectomy (10/13/2015); Appendectomy (10/13/2015); Hemicolectomy (10/13/2015); Cataract extraction (10/13/2015); and CT guided imaging for needle placement (2/4/2015).     Social History  She reports that she has never smoked. She has never used smokeless tobacco. No history on file for alcohol use and drug use.     Allergies  Patient has no known allergies.    Review of Systems    Psychiatric ROS - Adult  Anxiety: Mild  Depression: negative  Delirium: negative  Psychosis: negative  Margret: negative  Safety Issues: none  Psychiatric ROS  "Comment: The patient states that she is not suicidal,  and denies AVH. The patient states that she is not depressed and the only concern now is a caring and safe environment for her to live in.    Physical Exam      Mental Status Exam  General: NAD  Appearance: Hospital attire  Attitude: pleasant  Behavior: cooperative  Motor Activity: using walker  Speech: normal rate and volume  Mood: ok  Affect: more open  Thought Process: more organized  Thought Content: no delusions elicited, denies SI/HI  Thought Perception: denies AH/VH/paranoia  Cognition: fair  Insight: fair  Judgement: fair    Psychiatric Risk Assessment  Violence Risk Assessment: none  Acute Risk of Harm to Others is Considered: low   Suicide Risk Assessment: age > 65 yrs old, , chronic medical illness, history of trauma or abuse, and living alone or lack of social support  Protective Factors against Suicide: fear of suicide and Yarsanism affiliation/spirituality  Acute Risk of Harm to Self is Considered: low    Last Recorded Vitals  Blood pressure (!) 128/93, pulse 84, temperature 36.1 °C (97 °F), temperature source Temporal, resp. rate 17, height 1.626 m (5' 4.02\"), weight 127 kg (278 lb 15.9 oz), SpO2 99 %.    Relevant Results      Current Facility-Administered Medications:     acetaminophen (Tylenol) tablet 650 mg, 650 mg, oral, q4h PRN, Navin Brooke DO, 650 mg at 10/29/23 0755    alum-mag hydroxide-simeth (Mylanta) 200-200-20 mg/5 mL oral suspension 30 mL, 30 mL, oral, q6h PRN, Navin Brooke DO    amLODIPine (Norvasc) tablet 10 mg, 10 mg, oral, Daily, Osiel Forman MD, 10 mg at 11/09/23 0829    atorvastatin (Lipitor) tablet 40 mg, 40 mg, oral, Nightly, Osiel Forman MD, 40 mg at 11/08/23 2047    carvedilol (Coreg) tablet 25 mg, 25 mg, oral, BID with meals, Osiel Forman MD, 25 mg at 11/09/23 0829    dextrose 10 % in water (D10W) infusion, 0.3 g/kg/hr, intravenous, Once PRN, Osiel Forman MD    dextrose 50 % " injection 25 g, 25 g, intravenous, q15 min PRN, Osiel Forman MD    diphenhydrAMINE (BENADryl) capsule 50 mg, 50 mg, oral, q6h PRN **OR** diphenhydrAMINE (BENADryl) injection 50 mg, 50 mg, intramuscular, Once PRN, Navin Brooke DO    glucagon (Glucagen) injection 1 mg, 1 mg, intramuscular, q15 min PRN, Osiel Forman MD    icosapent ethyL (Vascepa) capsule 1 g, 1 g, oral, BID with meals, Osiel Forman MD, 1 g at 11/09/23 0829    insulin glargine (Lantus) injection 35 Units, 35 Units, subcutaneous, Nightly, Osiel Forman MD    latanoprost (Xalatan) 0.005 % ophthalmic solution 1 drop, 1 drop, Both Eyes, Nightly, Osiel Forman MD, 1 drop at 11/08/23 2047    losartan (Cozaar) tablet 100 mg, 100 mg, oral, Daily, Osiel Forman MD, 100 mg at 11/09/23 0829    magnesium hydroxide (Milk of Magnesia) 400 mg/5 mL suspension 30 mL, 30 mL, oral, Daily PRN, Navin Brooke DO    magnesium oxide (Mag-Ox) tablet 400 mg, 400 mg, oral, Daily, Osiel Forman MD, 400 mg at 11/09/23 0829    OLANZapine (ZyPREXA) tablet 5 mg, 5 mg, oral, q6h PRN **OR** OLANZapine (ZyPREXA) injection 5 mg, 5 mg, intramuscular, q6h PRN, Navin Brooke DO    pantoprazole (ProtoNix) EC tablet 40 mg, 40 mg, oral, Daily before breakfast, Osiel Forman MD, 40 mg at 11/09/23 0659    traZODone (Desyrel) tablet 25 mg, 25 mg, oral, Nightly PRN, Navin Brooke DO    venlafaxine XR (Effexor-XR) 24 hr capsule 150 mg, 150 mg, oral, Daily, Navin Brooke DO, 150 mg at 11/09/23 0829      Assessment/Plan   Principal Problem:    Mood disorder   Active Problems:    Hyperlipidemia    GERD (gastroesophageal reflux disease)    Type 2 diabetes mellitus without complication, with long-term current use of insulin (CMS/[facility])    Primary hypertension    Continue meds as ordered  Continue daily rounds.   Continue to encourage scheduled medication.  Encourage participation in group therapy- goes to every group.  Appreciate  social work arranging placement in assisted living - patient will be discharged tomorrow if facility accepts           Medication Consent  Medication Consent: risks, benefits, side effects reviewed for all ordered medications    I spent 30  minutes in the professional and overall care of this patient.                  I spent 25 minutes in the professional and overall care of this patient.      EMILIANA Escalante-CNSMadalyn Ricci is a 83 y.o. female on day 23 of admission presenting with Mood disorder (CMS/[facility]).Madalyn Ricci is a 83 y.o. female on day 23 of admission presenting with Mood disorder (CMS/[facility]).Madalyn J Radhames is a 83 y.o. female on day 23 of admission presenting with Mood disorder (CMS/[facility]).

## 2023-11-09 NOTE — GROUP NOTE
Group Topic: Reminiscence   Group Date: 11/9/2023  Start Time: 1045  End Time: 1130  Facilitators: JENNIFER Gramajo   Department: [facility]    Number of Participants: 2   Group Focus: reminiscence  Treatment Modality: Other: Recreation therapy  Interventions utilized were reminiscence  Purpose: feelings    Name: Madalyn Ricci YOB: 1940   MR: [id number]      Facilitator: Recreational Therapist  Level of Participation: active  Quality of Participation: appropriate/pleasant, attentive, cooperative, and engaged  Interactions with others: appropriate and gave feedback  Mood/Affect: appropriate  Triggers (if applicable): n/a  Cognition: coherent/clear  Progress: Significant  Comments: pt problem is depressed mood.  Plan: continue with services

## 2023-11-09 NOTE — PROGRESS NOTES
KAYCEE called AL and left a VM regarding the next steps for pt being able to be transferred to their services.

## 2023-11-10 LAB
GLUCOSE BLD MANUAL STRIP-MCNC: 161 MG/DL (ref 74–99)
GLUCOSE BLD MANUAL STRIP-MCNC: 177 MG/DL (ref 74–99)
GLUCOSE BLD MANUAL STRIP-MCNC: 75 MG/DL (ref 74–99)

## 2023-11-10 PROCEDURE — 2500000001 HC RX 250 WO HCPCS SELF ADMINISTERED DRUGS (ALT 637 FOR MEDICARE OP): Performed by: PSYCHIATRY & NEUROLOGY

## 2023-11-10 PROCEDURE — 99232 SBSQ HOSP IP/OBS MODERATE 35: CPT | Performed by: REGISTERED NURSE

## 2023-11-10 PROCEDURE — 2500000004 HC RX 250 GENERAL PHARMACY W/ HCPCS (ALT 636 FOR OP/ED): Performed by: INTERNAL MEDICINE

## 2023-11-10 PROCEDURE — 99232 SBSQ HOSP IP/OBS MODERATE 35: CPT | Performed by: INTERNAL MEDICINE

## 2023-11-10 PROCEDURE — 97530 THERAPEUTIC ACTIVITIES: CPT | Mod: GP,CQ

## 2023-11-10 PROCEDURE — 1140000001 HC PRIVATE PSYCH ROOM DAILY

## 2023-11-10 PROCEDURE — 2500000001 HC RX 250 WO HCPCS SELF ADMINISTERED DRUGS (ALT 637 FOR MEDICARE OP): Performed by: INTERNAL MEDICINE

## 2023-11-10 PROCEDURE — 82947 ASSAY GLUCOSE BLOOD QUANT: CPT

## 2023-11-10 RX ADMIN — INSULIN GLARGINE 35 UNITS: 100 INJECTION, SOLUTION SUBCUTANEOUS at 20:53

## 2023-11-10 RX ADMIN — ICOSAPENT ETHYL 1 G: 1 CAPSULE ORAL at 08:10

## 2023-11-10 RX ADMIN — CARVEDILOL 25 MG: 12.5 TABLET, FILM COATED ORAL at 08:00

## 2023-11-10 RX ADMIN — LOSARTAN POTASSIUM 100 MG: 50 TABLET, FILM COATED ORAL at 09:00

## 2023-11-10 RX ADMIN — VENLAFAXINE HYDROCHLORIDE 150 MG: 150 CAPSULE, EXTENDED RELEASE ORAL at 08:10

## 2023-11-10 RX ADMIN — LATANOPROST 1 DROP: 50 SOLUTION OPHTHALMIC at 20:53

## 2023-11-10 RX ADMIN — ATORVASTATIN CALCIUM 40 MG: 40 TABLET, FILM COATED ORAL at 20:53

## 2023-11-10 RX ADMIN — PANTOPRAZOLE SODIUM 40 MG: 40 TABLET, DELAYED RELEASE ORAL at 06:36

## 2023-11-10 RX ADMIN — AMLODIPINE BESYLATE 10 MG: 10 TABLET ORAL at 08:10

## 2023-11-10 RX ADMIN — ICOSAPENT ETHYL 1 G: 1 CAPSULE ORAL at 17:42

## 2023-11-10 RX ADMIN — Medication 400 MG: at 08:10

## 2023-11-10 RX ADMIN — CARVEDILOL 25 MG: 12.5 TABLET, FILM COATED ORAL at 17:42

## 2023-11-10 ASSESSMENT — PAIN SCALES - GENERAL
PAINLEVEL_OUTOF10: 0 - NO PAIN

## 2023-11-10 ASSESSMENT — COLUMBIA-SUICIDE SEVERITY RATING SCALE - C-SSRS
1. SINCE LAST CONTACT, HAVE YOU WISHED YOU WERE DEAD OR WISHED YOU COULD GO TO SLEEP AND NOT WAKE UP?: NO
6. HAVE YOU EVER DONE ANYTHING, STARTED TO DO ANYTHING, OR PREPARED TO DO ANYTHING TO END YOUR LIFE?: NO
2. HAVE YOU ACTUALLY HAD ANY THOUGHTS OF KILLING YOURSELF?: NO
2. HAVE YOU ACTUALLY HAD ANY THOUGHTS OF KILLING YOURSELF?: NO
1. SINCE LAST CONTACT, HAVE YOU WISHED YOU WERE DEAD OR WISHED YOU COULD GO TO SLEEP AND NOT WAKE UP?: NO
6. HAVE YOU EVER DONE ANYTHING, STARTED TO DO ANYTHING, OR PREPARED TO DO ANYTHING TO END YOUR LIFE?: NO

## 2023-11-10 ASSESSMENT — PAIN - FUNCTIONAL ASSESSMENT
PAIN_FUNCTIONAL_ASSESSMENT: 0-10

## 2023-11-10 ASSESSMENT — COGNITIVE AND FUNCTIONAL STATUS - GENERAL
CLIMB 3 TO 5 STEPS WITH RAILING: A LITTLE
MOBILITY SCORE: 23

## 2023-11-10 NOTE — GROUP NOTE
Group Topic: Other   Group Date: 11/10/2023  Start Time: 1030  End Time: 1140  Facilitators: JENNIFER Gramajo   Department: [facility] RE[facility]IRT[facility]    Number of Participants: 4   Group Focus: other Nov is...  Treatment Modality: Other: Recreation therapy  Interventions utilized were mental fitness, orientation, reminiscence, and story telling  Purpose: other: express feelings, elevate mood,  stimulate memory, increase socialziation    Name: Madalyn Ricci YOB: 1940   MR: [id number]      Facilitator: Recreational Therapist  Level of Participation: active  Quality of Participation: appropriate/pleasant, attentive, cooperative, and engaged  Interactions with others: appropriate  Mood/Affect: appropriate  Triggers (if applicable): n/a  Cognition: coherent/clear  Progress: Significant  Comments: pt problem is depressed mood.    Plan: continue with services

## 2023-11-10 NOTE — PROGRESS NOTES
Physical Therapy       11/10/23 8927-3505   PT  Visit   PT Received On 11/10/23   Response to Previous Treatment Patient with no complaints from previous session.   General   Reason for Referral impaired mobility   Referred By Dr. Brooke   Past Medical History Relevant to Rehab HTN, hyperlipidemia, GERD, DM, neuropathy   Preferred Learning Style auditory;verbal   General Comment pleasant, cooperative, motivated for PT   Precautions   Medical Precautions Fall precautions   Pain Assessment   Pain Assessment 0-10   Pain Score 0 - No pain   Therapeutic Exercise   Therapeutic Exercise Activity 1 chair push-ups 2x15 reps   Therapeutic Exercise Activity 2 heel raises standing 1x15 reps   Balance/Neuromuscular Re-Education   Balance/Neuromuscular Re-Education Activity 1 static stand reaching task beyond STEPHANIE (lateral, across body, overhead), RW but no UE assist, close supervision   Balance/Neuromuscular Re-Education Activity 2 amb 10 ft x 4, no AD, CGA, increased lateral sway, but no LOB, vc's to slow buddy   Bed Mobility 1   Bed Mobility 1 Supine to sitting;Sitting to supine   Level of Assistance 1 Independent   Ambulation/Gait Training 1   Surface 1 Level tile   Device 1 Rolling walker   Assistance 1 Distant supervision   Quality of Gait 1 Wide base of support   Comments/Distance (ft) 1 100 ft x 4, turns included with wide turns utilized, good buddy, no instability with use of RW   Transfer 1   Technique 1 Sit to stand;Stand to sit   Transfer Device 1 Walker   Transfer Level of Assistance 1 Independent   Activity Tolerance   Endurance Tolerates 10 - 20 min exercise with multiple rests   PT Assessment   PT Assessment Results Decreased strength;Decreased endurance;Impaired balance;Decreased mobility;Decreased safety awareness   Rehab Prognosis Good   Evaluation/Treatment Tolerance Patient tolerated treatment well   Strengths Ability to acquire knowledge   Assessment Comment Motivated in therapy and displays good  effort. Safety cues required for mobility tasks.   End of Session Patient Position Up in chair   PT Plan   Inpatient/Swing Bed or Outpatient Inpatient   PT Plan   Treatment/Interventions Transfer training;Gait training;Neuromuscular re-education;Therapeutic exercise;Therapeutic activity   PT Plan Skilled PT   PT Frequency 4 times per week   PT Discharge Recommendations Low intensity level of continued care   Equipment Recommended upon Discharge Wheeled walker   PT Recommended Transfer Status Stand by assist        11/10/23 4001   [facility] Basic Mobility   Turning from your back to your side while in a flat bed without using bedrails 4   Moving from lying on your back to sitting on the side of a flat bed without using bedrails 4   Moving to and from bed to chair (including a wheelchair) 4   Standing up from a chair using your arms (e.g. wheelchair or bedside chair) 4   To walk in hospital room 4   Climbing 3-5 steps with railing 3   Basic Mobility - Total Score 23

## 2023-11-10 NOTE — PROGRESS NOTES
Madalyn Ricci is a 83 y.o. female on day 24 of admission presenting with Mood disorder (CMS/HCC).Madalyn Ricci is a 83 y.o. female on day 24 of admission presenting with Mood disorder (CMS/HCC).Madalyn Ricci is a 83 y.o. female on day 24 of admission presenting with Mood disorder (CMS/HCC).      Patient is an 84yo female admitted for Mood Disorder.    Case discussed in morning team.    Vitals signs reviewed  Patient case was discussed in morning team.  She slept 8hs appetite is good, mood is good. Taking meds as ordered. No prns required. She is attending groups.  She is participating in PT/OT.  Social work is looking into discharge. Awaiting acceptance at assisted living . Patient is anticipating discharge Monday as assisted living says they can't accept until then She was interviewed in group room.    Past Medical History  She has a past medical history of Diabetes mellitus (CMS/HCC) and Hypertension.    Past Psychiatric History:   Previous therapy: no  Previous psychiatric treatment and medication trials: no  Previous psychiatric hospitalizations: no  Previous diagnoses: no  Previous suicide attempts: no  History of violence: no  Currently in treatment with N/A.  Depression screening was performed with standardized tool: Yes, Depression on admission    Surgical History  She has a past surgical history that includes Lung lobectomy (10/13/2015); Hysterectomy (10/13/2015); Cholecystectomy (10/13/2015); Appendectomy (10/13/2015); Hemicolectomy (10/13/2015); Cataract extraction (10/13/2015); and CT guided imaging for needle placement (2/4/2015).     Social History  She reports that she has never smoked. She has never used smokeless tobacco. No history on file for alcohol use and drug use.     Allergies  Patient has no known allergies.    Review of Systems    Psychiatric ROS - Adult  Anxiety: Mild  Depression: negative  Delirium: negative  Psychosis: negative  Margret: negative  Safety Issues:  "none  Psychiatric ROS Comment: The patient states that she is not suicidal,  and denies AVH. The patient states that she is not depressed and the only concern now is a caring and safe environment for her to live in.    Physical Exam      Mental Status Exam  General: NAD  Appearance: Hospital attire  Attitude: pleasant  Behavior: cooperative  Motor Activity: using walker  Speech: normal rate and volume  Mood: ok  Affect: more open  Thought Process: more organized  Thought Content: no delusions elicited, denies SI/HI  Thought Perception: denies AH/VH/paranoia  Cognition: fair  Insight: fair  Judgement: fair    Psychiatric Risk Assessment  Violence Risk Assessment: none  Acute Risk of Harm to Others is Considered: low   Suicide Risk Assessment: age > 65 yrs old, , chronic medical illness, history of trauma or abuse, and living alone or lack of social support  Protective Factors against Suicide: fear of suicide and [employer] affiliation/spirituality  Acute Risk of Harm to Self is Considered: low    Last Recorded Vitals  Blood pressure (!) 128/93, pulse 84, temperature 36.1 °C (97 °F), temperature source Temporal, resp. rate 17, height 1.626 m (5' 4.02\"), weight 127 kg (278 lb 15.9 oz), SpO2 99 %.    Relevant Results      Current Facility-Administered Medications:     acetaminophen (Tylenol) tablet 650 mg, 650 mg, oral, q4h PRN, Navin Brooke DO, 650 mg at 10/29/23 0755    alum-mag hydroxide-simeth (Mylanta) 200-200-20 mg/5 mL oral suspension 30 mL, 30 mL, oral, q6h PRN, Navin Brooke DO    amLODIPine (Norvasc) tablet 10 mg, 10 mg, oral, Daily, Osiel Forman MD, 10 mg at 11/10/23 0810    atorvastatin (Lipitor) tablet 40 mg, 40 mg, oral, Nightly, Osiel Forman MD, 40 mg at 11/09/23 2034    carvedilol (Coreg) tablet 25 mg, 25 mg, oral, BID with meals, Osiel Forman MD, 25 mg at 11/10/23 0800    dextrose 10 % in water (D10W) infusion, 0.3 g/kg/hr, intravenous, Once PRN, Osiel LAGUNA" MD Dickson    dextrose 50 % injection 25 g, 25 g, intravenous, q15 min PRN, Osiel Forman MD    diphenhydrAMINE (BENADryl) capsule 50 mg, 50 mg, oral, q6h PRN **OR** diphenhydrAMINE (BENADryl) injection 50 mg, 50 mg, intramuscular, Once PRN, Navin Brooke DO    glucagon (Glucagen) injection 1 mg, 1 mg, intramuscular, q15 min PRN, Osiel Forman MD    icosapent ethyL (Vascepa) capsule 1 g, 1 g, oral, BID with meals, Osiel Forman MD, 1 g at 11/10/23 0810    insulin glargine (Lantus) injection 35 Units, 35 Units, subcutaneous, Nightly, Osiel Forman MD, 35 Units at 11/09/23 2035    latanoprost (Xalatan) 0.005 % ophthalmic solution 1 drop, 1 drop, Both Eyes, Nightly, Osiel Forman MD, 1 drop at 11/09/23 2035    losartan (Cozaar) tablet 100 mg, 100 mg, oral, Daily, Osiel Forman MD, 100 mg at 11/10/23 0900    magnesium hydroxide (Milk of Magnesia) 400 mg/5 mL suspension 30 mL, 30 mL, oral, Daily PRN, Navin Brooke DO    magnesium oxide (Mag-Ox) tablet 400 mg, 400 mg, oral, Daily, Osiel Forman MD, 400 mg at 11/10/23 0810    OLANZapine (ZyPREXA) tablet 5 mg, 5 mg, oral, q6h PRN **OR** OLANZapine (ZyPREXA) injection 5 mg, 5 mg, intramuscular, q6h PRN, Navin Brooke DO    pantoprazole (ProtoNix) EC tablet 40 mg, 40 mg, oral, Daily before breakfast, Osiel Forman MD, 40 mg at 11/10/23 0636    traZODone (Desyrel) tablet 25 mg, 25 mg, oral, Nightly PRN, Navin Brooke DO    venlafaxine XR (Effexor-XR) 24 hr capsule 150 mg, 150 mg, oral, Daily, Navin H DO Edwardo, 150 mg at 11/10/23 0810      Assessment/Plan   Principal Problem:    Mood disorder   Active Problems:    Hyperlipidemia    GERD (gastroesophageal reflux disease)    Type 2 diabetes mellitus without complication, with long-term current use of insulin (CMS/[facility])    Primary hypertension    Continue meds as ordered  Continue daily rounds.   Continue to encourage scheduled medication. Patient cont to  enrique SADIA.  Says mood is good and she is looking forward to discharge.  Encourage participation in group therapy- goes to every group.  Appreciate social work arranging placement in assisted living - patient will be discharged tomorrow if facility accepts. Assisted living will not receive patient until Monday.           Medication Consent  Medication Consent: risks, benefits, side effects reviewed for all ordered medications    I spent 30  minutes in the professional and overall care of this patient.                  I spent 25 minutes in the professional and overall care of this patient.      EMILIANA Escalante-Mari Ricci is a 83 y.o. female on day 24 of admission presenting with Mood disorder (CMS/HCC).Madalyn Ricci is a 83 y.o. female on day 24 of admission presenting with Mood disorder (CMS/HCC).Madalyn Ricci is a 83 y.o. female on day 24 of admission presenting with Mood disorder (CMS/HCC).Madalyn Ricci is a 83 y.o. female on day 24 of admission presenting with Mood disorder (CMS/HCC).

## 2023-11-10 NOTE — PROGRESS NOTES
" REHAB Therapy Assessment & Treatment    Patient Name: Madalyn Ricci  MRN: [id number]  Today's Date: 11/10/2023      Recreation note : pt is alert and oriented x 3 with some poor insight/judgement.  Attends groups of choice daily and displays pleasant and cooperative with mood and behaviors.  Pt has reported \"looking forward to going to a new place to live\".  Pt has a bright affect and appropriate eye contact with interactions.  Pt is eating well and sleeping well.  Will continue to encourage pt to attend groups of choice daily.  "

## 2023-11-10 NOTE — CARE PLAN
The patient's goals for the shift include no falls/d/c    The clinical goals for the shift include no falls    Over the shift, the patient did make progress toward the following goals. Barriers to progression include few, primarily just waiting for d/c. Recommendations to address these barriers include preparing the pt. To organize and prepare her affairs for a discharge in the near future.      Problem: Fall/Injury  Goal: Not fall by end of shift  Outcome: Progressing     Problem: Fall/Injury  Goal: Verbalize understanding of personal risk factors for fall in the hospital  Outcome: Progressing     Problem: Fall/Injury  Goal: Use assistive devices by end of the shift  Outcome: Progressing

## 2023-11-10 NOTE — PROGRESS NOTES
[facility]: [facility]  PROGRESS NOTE      Madalyn Ricci is a 83 y.o. female that is being seen  today for follow-up at [facility].  No chief complaint on file..  Subjective   Patient is being seen for follow-up of [facility].  Patient's blood pressure has been better today,   No recent falls or episodes of agitation.  Patient is usually in the wheelchair since she has difficulty with walking.  Denies any other symptoms.    ROS  Negative for fever or chills  Negative for sore throat, ear pain, nasal discharge  Negative for cough, shortness of breath or wheezing  Negative for chest pain, palpitations, swelling of legs  Negative for abdominal pain, constipation, diarrhea, blood in the stools  Negative for urinary complaints  Negative for headache, dizziness, weakness or numbness  Negative for joint pain.  Positive for difficulty in walking  Positive for anxiety  All other systems reviewed and were negative   Vitals:    11/10/23 0630   BP: 143/64   Pulse: 67   Resp:    Temp: 36.7 °C (98.1 °F)   SpO2: 99%      Physical Exam  Constitutional: Patient does not appear to be in any acute distress  Head and Face: NCAT  Eyes: Normal external exam, EOMI  ENT: Normal external inspection of ears and nose. Oropharynx normal.  Cardiovascular: RRR, S1/S2, no murmurs, rubs, or gallops, radial pulses +2, no edema of extremities  Pulmonary: CTAB, no respiratory distress.  Abdomen: +BS, soft, non-tender, nondistended, no guarding or rebound, no masses noted  MSK: No joint swelling, normal movements of all extremities. Range of motion- normal.  Skin- No lesions, contusions, or erythema.  Peripheral puslses palpable bilaterally 2+  Neuro: AAO X3, Cranial nerves 2-12 grossly intact,DTR 2+ in all 4 limbs   Psychiatric: Judgment intact. Appropriate mood and behavior    Diagnostic Results   Lab Results   Component Value Date    GLUCOSE 199 (H) 10/16/2023    CALCIUM 9.3 10/16/2023     10/16/2023    K 3.7  10/16/2023    CO2 26 10/16/2023     (H) 10/16/2023    BUN 31 (H) 10/16/2023    CREATININE 1.20 10/16/2023     Lab Results   Component Value Date    ALT 11 10/16/2023    AST 16 10/16/2023    ALKPHOS 74 10/16/2023    BILITOT 0.3 10/16/2023     Lab Results   Component Value Date    WBC 6.4 10/16/2023    HGB 14.1 10/16/2023    HCT 44.1 10/16/2023    MCV 90 10/16/2023     10/16/2023     Lab Results   Component Value Date    CHOL 138 06/26/2020    CHOL 142 09/20/2019    CHOL 131 (L) 03/05/2019     Lab Results   Component Value Date    HDL 36 (L) 06/26/2020    HDL 36 (L) 09/20/2019    HDL 37 (L) 03/05/2019     Lab Results   Component Value Date    LDLCALC 44 (L) 06/26/2020    LDLCALC 36 (L) 09/20/2019    LDLCALC 49 (L) 03/05/2019     Lab Results   Component Value Date    TRIG 291 (H) 06/26/2020    TRIG 350 (H) 09/20/2019    TRIG 224 (H) 03/05/2019     Lab Results   Component Value Date    HGBA1C 6.4 (H) 12/12/2020     Other labs not included in the list above were reviewed either before or during this encounter.    History    Past Medical History:   Diagnosis Date    Diabetes mellitus (CMS/HCC)     Hypertension      Past Surgical History:   Procedure Laterality Date    APPENDECTOMY  10/13/2015    Appendectomy    CATARACT EXTRACTION  10/13/2015    Cataract Surgery    CHOLECYSTECTOMY  10/13/2015    Cholecystectomy    CT GUIDED IMAGING FOR NEEDLE PLACEMENT  2/4/2015    CT GUIDED IMAGING FOR NEEDLE PLACEMENT [facility]    HEMICOLECTOMY  10/13/2015    Hemicolectomy    HYSTERECTOMY  10/13/2015    Hysterectomy    LUNG LOBECTOMY  10/13/2015    Lung Lobectomy     No family history on file.  No Known Allergies  No current facility-administered medications on file prior to encounter.     Current Outpatient Medications on File Prior to Encounter   Medication Sig Dispense Refill    amLODIPine (Norvasc) 10 mg tablet Take 1 tablet (10 mg) by mouth once daily.      atorvastatin (Lipitor) 40 mg tablet Take 1 tablet  "(40 mg) by mouth once daily.      carvedilol (Coreg) 25 mg tablet Take 1 tablet (25 mg) by mouth 2 times a day with meals.      [] cefdinir (Omnicef) 300 mg capsule Take 1 capsule (300 mg) by mouth 2 times a day for 7 days. 14 capsule 0    icosapent ethyL (Vascepa) 1 gram capsule Take 1 capsule (1 g) by mouth 2 times a day with meals.      insulin aspart (NovoLOG U-100 Insulin aspart) 100 unit/mL injection Inject 100 Units under the skin 3 times a day before meals. Take as directed per insulin instructions.      insulin degludec (Tresiba FlexTouch U-200) 200 unit/mL (3 mL) injection Inject 40 Units under the skin once daily at bedtime. Take as directed per insulin instructions.      latanoprost (Xalatan) 0.005 % ophthalmic solution Administer 1 drop into both eyes once daily at bedtime.      losartan (Cozaar) 50 mg tablet Take 2 tablets (100 mg) by mouth once daily.      magnesium oxide (Mag-Ox) 400 mg tablet Take 1 tablet (400 mg) by mouth once daily.      omeprazole (PriLOSEC) 40 mg DR capsule Take 1 capsule (40 mg) by mouth once daily in the morning. Take before meals. Do not crush or chew.      pen needle, diabetic (BD Traci 2nd Gen Pen Needle) 32 gauge x 5/32\" needle       potassium chloride ER (Micro-K) 10 mEq ER capsule Take 2 capsules (20 mEq) by mouth once daily. Do not crush or chew.      venlafaxine XR (Effexor-XR) 150 mg 24 hr capsule Take 1 capsule (150 mg) by mouth once daily. Do not crush or chew.      [DISCONTINUED] ferrous sulfate 325 (65 Fe) MG EC tablet Take 65 mg by mouth 3 times a day with meals. Do not crush, chew, or split.      [DISCONTINUED] fluticasone propion-salmeteroL (Advair) 115-21 mcg/actuation inhaler Inhale 2 puffs 2 times a day. Rinse mouth with water after use to reduce aftertaste and incidence of candidiasis. Do not swallow.      [DISCONTINUED] hydroCHLOROthiazide (HYDRODiuril) 50 mg tablet Take 1 tablet (50 mg) by mouth once daily.      [DISCONTINUED] vitamin E 450 mg " (1000 unit) capsule Take 100 Units by mouth once daily.         There is no immunization history on file for this patient.  Patient's medical history was reviewed and updated either before or during this encounter.  ASSESSMENT / PLAN:  Active Hospital Problems    Hyperlipidemia      GERD (gastroesophageal reflux disease)      Type 2 diabetes mellitus without complication, with long-term current use of insulin (CMS/[facility])      Primary hypertension      *Mood disorder (CMS/[facility])    Patient's blood pressure is better .  Blood sugars are better.No episode of hypoglycemia    denies any significant complaints.  Patient is on statins.  Patient is being seen by geropsych team.        Osiel Forman MD

## 2023-11-10 NOTE — PROGRESS NOTES
Social Work Note    LISW-S called assisted living facility to see the status of pt being able to discharge. LISW-S was informed that pt's room is currently not ready d/t having no furniture and that they will talk to their supervisor to help get us a date for pt's transition. LISW-S will follow up with them on Monday for any updates regarding referral.

## 2023-11-10 NOTE — GROUP NOTE
Group Topic: Other   Group Date: 11/10/2023  Start Time: 1510  End Time: 1545  Facilitators: JENNIFER Gramajo   Department: [facility] REHABTH VIRTUAL    Number of Participants: 5   Group Focus[facility] What year is it?  Treatment Modality: Other: Recreation therapy  Interventions utilized were mental fitness, orientation, reminiscence, and story telling  Purpose: other: increase attention, elevate mood, increase socialization    Name: Madalyn Ricci YOB: 1940   MR: [id number]      Facilitator: Recreational Therapist  Level of Participation: active  Quality of Participation: appropriate/pleasant, attentive, cooperative, and engaged  Interactions with others: appropriate  Mood/Affect: appropriate  Triggers (if applicable): n/a  Cognition: coherent/clear  Progress: Significant  Comments: pt problem is depressed mood.    Plan: continue with services

## 2023-11-10 NOTE — NURSING NOTE
BIRP NOTE     Problem:  Depression     Behavior:  Pt sitting in group room at start of shift watching tv. Pleasant, smiling upon approach, social with peer. Compliant with meds, cooperative with care. Makes needs known.  Group Participation: n/a  Appetite/Meals: HS snack provided     Interventions:  1:1 provided. Evening meds administered per orders. Encouraged to use call light for needs.    Response:  Pt retired to room before 2200. Resting in bed. Walker within reach.     Plan:  Will continue to monitor behaviors and effectiveness of interventions while maintaining pt safety.

## 2023-11-11 LAB
GLUCOSE BLD MANUAL STRIP-MCNC: 137 MG/DL (ref 74–99)
GLUCOSE BLD MANUAL STRIP-MCNC: 155 MG/DL (ref 74–99)
GLUCOSE BLD MANUAL STRIP-MCNC: 197 MG/DL (ref 74–99)
GLUCOSE BLD MANUAL STRIP-MCNC: 92 MG/DL (ref 74–99)

## 2023-11-11 PROCEDURE — 97116 GAIT TRAINING THERAPY: CPT | Mod: GP

## 2023-11-11 PROCEDURE — 2500000001 HC RX 250 WO HCPCS SELF ADMINISTERED DRUGS (ALT 637 FOR MEDICARE OP): Performed by: INTERNAL MEDICINE

## 2023-11-11 PROCEDURE — 97110 THERAPEUTIC EXERCISES: CPT | Mod: GP

## 2023-11-11 PROCEDURE — 2500000004 HC RX 250 GENERAL PHARMACY W/ HCPCS (ALT 636 FOR OP/ED): Performed by: INTERNAL MEDICINE

## 2023-11-11 PROCEDURE — 99232 SBSQ HOSP IP/OBS MODERATE 35: CPT | Performed by: STUDENT IN AN ORGANIZED HEALTH CARE EDUCATION/TRAINING PROGRAM

## 2023-11-11 PROCEDURE — 1140000001 HC PRIVATE PSYCH ROOM DAILY

## 2023-11-11 PROCEDURE — 82947 ASSAY GLUCOSE BLOOD QUANT: CPT

## 2023-11-11 PROCEDURE — 2500000001 HC RX 250 WO HCPCS SELF ADMINISTERED DRUGS (ALT 637 FOR MEDICARE OP): Performed by: PSYCHIATRY & NEUROLOGY

## 2023-11-11 RX ADMIN — PANTOPRAZOLE SODIUM 40 MG: 40 TABLET, DELAYED RELEASE ORAL at 07:44

## 2023-11-11 RX ADMIN — LOSARTAN POTASSIUM 100 MG: 50 TABLET, FILM COATED ORAL at 08:41

## 2023-11-11 RX ADMIN — ICOSAPENT ETHYL 1 G: 1 CAPSULE ORAL at 07:44

## 2023-11-11 RX ADMIN — AMLODIPINE BESYLATE 10 MG: 10 TABLET ORAL at 08:41

## 2023-11-11 RX ADMIN — LATANOPROST 1 DROP: 50 SOLUTION OPHTHALMIC at 20:28

## 2023-11-11 RX ADMIN — ATORVASTATIN CALCIUM 40 MG: 40 TABLET, FILM COATED ORAL at 20:28

## 2023-11-11 RX ADMIN — VENLAFAXINE HYDROCHLORIDE 150 MG: 150 CAPSULE, EXTENDED RELEASE ORAL at 08:42

## 2023-11-11 RX ADMIN — CARVEDILOL 25 MG: 12.5 TABLET, FILM COATED ORAL at 17:50

## 2023-11-11 RX ADMIN — Medication 400 MG: at 08:42

## 2023-11-11 RX ADMIN — ICOSAPENT ETHYL 1 G: 1 CAPSULE ORAL at 17:50

## 2023-11-11 RX ADMIN — CARVEDILOL 25 MG: 12.5 TABLET, FILM COATED ORAL at 07:44

## 2023-11-11 RX ADMIN — INSULIN GLARGINE 35 UNITS: 100 INJECTION, SOLUTION SUBCUTANEOUS at 20:28

## 2023-11-11 ASSESSMENT — COLUMBIA-SUICIDE SEVERITY RATING SCALE - C-SSRS
1. SINCE LAST CONTACT, HAVE YOU WISHED YOU WERE DEAD OR WISHED YOU COULD GO TO SLEEP AND NOT WAKE UP?: NO
1. SINCE LAST CONTACT, HAVE YOU WISHED YOU WERE DEAD OR WISHED YOU COULD GO TO SLEEP AND NOT WAKE UP?: NO
2. HAVE YOU ACTUALLY HAD ANY THOUGHTS OF KILLING YOURSELF?: NO
6. HAVE YOU EVER DONE ANYTHING, STARTED TO DO ANYTHING, OR PREPARED TO DO ANYTHING TO END YOUR LIFE?: NO
1. SINCE LAST CONTACT, HAVE YOU WISHED YOU WERE DEAD OR WISHED YOU COULD GO TO SLEEP AND NOT WAKE UP?: NO
6. HAVE YOU EVER DONE ANYTHING, STARTED TO DO ANYTHING, OR PREPARED TO DO ANYTHING TO END YOUR LIFE?: NO
2. HAVE YOU ACTUALLY HAD ANY THOUGHTS OF KILLING YOURSELF?: NO
6. HAVE YOU EVER DONE ANYTHING, STARTED TO DO ANYTHING, OR PREPARED TO DO ANYTHING TO END YOUR LIFE?: NO
2. HAVE YOU ACTUALLY HAD ANY THOUGHTS OF KILLING YOURSELF?: NO

## 2023-11-11 ASSESSMENT — PAIN - FUNCTIONAL ASSESSMENT: PAIN_FUNCTIONAL_ASSESSMENT: 0-10

## 2023-11-11 ASSESSMENT — COGNITIVE AND FUNCTIONAL STATUS - GENERAL
MOVING TO AND FROM BED TO CHAIR: A LITTLE
MOBILITY SCORE: 20
WALKING IN HOSPITAL ROOM: A LITTLE
CLIMB 3 TO 5 STEPS WITH RAILING: A LITTLE
STANDING UP FROM CHAIR USING ARMS: A LITTLE

## 2023-11-11 ASSESSMENT — PAIN SCALES - GENERAL: PAINLEVEL_OUTOF10: 2

## 2023-11-11 ASSESSMENT — PAIN DESCRIPTION - DESCRIPTORS: DESCRIPTORS: ACHING

## 2023-11-11 NOTE — CARE PLAN
The patient's goals for the shift include Be medication compliant    The clinical goals for the shift include No fall by end of shift    Over the shift, the patient did not make progress toward the following goals. Barriers to progression include pt not having appropriate footwear on and pt not being able to recall ways to reduce in hospitals falls.     Recommendations to address these barriers include educating pt on wearing appropriate non-slip footwear when ambulating around and provided pt with education on how to use call light to ask for assistance with needing help.

## 2023-11-11 NOTE — GROUP NOTE
Group Topic: Social Skills   Group Date: 11/11/2023  Start Time: 1045  End Time: 1130  Facilitators: JENNIFER Martin   Department: [facility] REHABTH VIRTUAL    Number of Participants: 4   Group Focus: communication, reminiscence, and social skills  Treatment Modality: Other: Recreation Therapy  Interventions utilized were leisure development, mental fitness, and reminiscence  Purpose: other: Increase attention, increase stimulation, increase socialization, increase mood, increase eye-hand coordination.     Name: Madalyn Ricci YOB: 1940   MR: [id number]      Facilitator: Recreational Therapist  Level of Participation: active  Quality of Participation: appropriate/pleasant, cooperative, and engaged  Interactions with others: appropriate  Mood/Affect: appropriate and bright  Triggers (if applicable): n/a  Cognition: coherent/clear  Progress: Moderate  Comments: pt problem is depressed mood  Plan: continue with services

## 2023-11-11 NOTE — GROUP NOTE
Group Topic: Other   Group Date: 11/11/2023  Start Time: 1520  End Time: 1600  Facilitators: JENNIFER Martin   Department: [facility] REHABTH VIRTUAL    Number of Participants: 3   Group Focus: leisure skills, relaxation, and social skills  Treatment Modality: Other: Recreation Therapy  Interventions utilized were : cognitive games, active listening  Purpose: other: increase socialization , increase stimulation, fun/entertainment, increase leisure  and social skills , increase self confidence      Name: Madalyn Ricci YOB: 1940   MR: [id number]      Facilitator: Recreational Therapist  Level of Participation: active  Quality of Participation: appropriate/pleasant, attentive, cooperative, and engaged  Interactions with others: appropriate  Mood/Affect: appropriate and bright  Triggers (if applicable): n/a  Cognition: coherent/clear  Progress: Moderate  Comments: pt problem is depressed mood  Plan: continue with services

## 2023-11-11 NOTE — PROGRESS NOTES
Patient is an 84yo female admitted for Mood Disorder.    Case discussed in morning team.    Vitals signs reviewed  Patient case was discussed with nursing staff.  Reports good mood, sleep, appetite. Taking meds as ordered. No prns required. She is attending groups.  She is participating in PT/OT.  Social work is looking into discharge. Awaiting acceptance at assisted living . Patient is anticipating discharge Monday as assisted living says they can't accept until then.    Past Medical History  She has a past medical history of Diabetes mellitus (CMS/HCC) and Hypertension.    Past Psychiatric History:   Previous therapy: no  Previous psychiatric treatment and medication trials: no  Previous psychiatric hospitalizations: no  Previous diagnoses: no  Previous suicide attempts: no  History of violence: no  Currently in treatment with N/A.  Depression screening was performed with standardized tool: Yes, Depression on admission    Surgical History  She has a past surgical history that includes Lung lobectomy (10/13/2015); Hysterectomy (10/13/2015); Cholecystectomy (10/13/2015); Appendectomy (10/13/2015); Hemicolectomy (10/13/2015); Cataract extraction (10/13/2015); and CT guided imaging for needle placement (2/4/2015).     Social History  She reports that she has never smoked. She has never used smokeless tobacco. No history on file for alcohol use and drug use.     Allergies  Patient has no known allergies.    Review of Systems    Psychiatric ROS - Adult  Anxiety: Mild  Depression: negative  Delirium: negative  Psychosis: negative  Margret: negative  Safety Issues: none  Psychiatric ROS Comment: The patient states that she is not suicidal,  and denies AVH. The patient states that she is not depressed and the only concern now is a caring and safe environment for her to live in.    Physical Exam      Mental Status Exam  General: NAD  Appearance: Hospital attire  Attitude: pleasant  Behavior: cooperative  Motor Activity:  "using walker  Speech: normal rate and volume  Mood: \"good\"  Affect: more open  Thought Process: more organized  Thought Content: no delusions elicited, denies SI/HI  Thought Perception: denies AH/VH/paranoia  Cognition: fair  Insight: fair  Judgement: fair    Psychiatric Risk Assessment  Violence Risk Assessment: none  Acute Risk of Harm to Others is Considered: low   Suicide Risk Assessment: age > 65 yrs old, , chronic medical illness, history of trauma or abuse, and living alone or lack of social support  Protective Factors against Suicide: fear of suicide and [employer] affiliation/spirituality  Acute Risk of Harm to Self is Considered: low    Last Recorded Vitals  Blood pressure (!) 128/93, pulse 84, temperature 36.1 °C (97 °F), temperature source Temporal, resp. rate 17, height 1.626 m (5' 4.02\"), weight 127 kg (278 lb 15.9 oz), SpO2 99 %.    Relevant Results      Current Facility-Administered Medications:     acetaminophen (Tylenol) tablet 650 mg, 650 mg, oral, q4h PRN, Navin Brooke DO, 650 mg at 10/29/23 0755    alum-mag hydroxide-simeth (Mylanta) 200-200-20 mg/5 mL oral suspension 30 mL, 30 mL, oral, q6h PRN, Navin Brooke DO    amLODIPine (Norvasc) tablet 10 mg, 10 mg, oral, Daily, Osiel Forman MD, 10 mg at 11/11/23 0841    atorvastatin (Lipitor) tablet 40 mg, 40 mg, oral, Nightly, Osiel Forman MD, 40 mg at 11/10/23 2053    carvedilol (Coreg) tablet 25 mg, 25 mg, oral, BID with meals, Osiel Forman MD, 25 mg at 11/11/23 0744    dextrose 10 % in water (D10W) infusion, 0.3 g/kg/hr, intravenous, Once PRN, Osiel Forman MD    dextrose 50 % injection 25 g, 25 g, intravenous, q15 min PRN, Osiel Forman MD    diphenhydrAMINE (BENADryl) capsule 50 mg, 50 mg, oral, q6h PRN **OR** diphenhydrAMINE (BENADryl) injection 50 mg, 50 mg, intramuscular, Once PRN, Navin Brooke DO    glucagon (Glucagen) injection 1 mg, 1 mg, intramuscular, q15 min PRN, Osiel LAGUNA" MD Dickson    icosapent ethyL (Vascepa) capsule 1 g, 1 g, oral, BID with meals, Osiel Forman MD, 1 g at 11/11/23 0744    insulin glargine (Lantus) injection 35 Units, 35 Units, subcutaneous, Nightly, Osiel Forman MD, 35 Units at 11/10/23 2053    latanoprost (Xalatan) 0.005 % ophthalmic solution 1 drop, 1 drop, Both Eyes, Nightly, Osiel Forman MD, 1 drop at 11/10/23 2053    losartan (Cozaar) tablet 100 mg, 100 mg, oral, Daily, Osiel Forman MD, 100 mg at 11/11/23 0841    magnesium hydroxide (Milk of Magnesia) 400 mg/5 mL suspension 30 mL, 30 mL, oral, Daily PRN, Navin Brooke DO    magnesium oxide (Mag-Ox) tablet 400 mg, 400 mg, oral, Daily, Osiel Forman MD, 400 mg at 11/11/23 0842    OLANZapine (ZyPREXA) tablet 5 mg, 5 mg, oral, q6h PRN **OR** OLANZapine (ZyPREXA) injection 5 mg, 5 mg, intramuscular, q6h PRN, Navin Brooke DO    pantoprazole (ProtoNix) EC tablet 40 mg, 40 mg, oral, Daily before breakfast, Osiel Forman MD, 40 mg at 11/11/23 0744    traZODone (Desyrel) tablet 25 mg, 25 mg, oral, Nightly PRN, Navin Brooke DO    venlafaxine XR (Effexor-XR) 24 hr capsule 150 mg, 150 mg, oral, Daily, Navin Brooke DO, 150 mg at 11/11/23 0842      Assessment/Plan   Principal Problem:    Mood disorder   Active Problems:    Hyperlipidemia    GERD (gastroesophageal reflux disease)    Type 2 diabetes mellitus without complication, with long-term current use of insulin (CMS/[facility])    Primary hypertension    Continue meds as ordered  Continue daily rounds.   Continue to encourage scheduled medication. Patient cont to deny SI.  Says mood is good and she is looking forward to discharge.  Encourage participation in group therapy- goes to every group.  Appreciate social work arranging placement in assisted living - patient will be discharged tomorrow if facility accepts. Assisted living will not receive patient until Monday.           Medication Consent  Medication  Consent: risks, benefits, side effects reviewed for all ordered medications    I spent 30  minutes in the professional and overall care of this patient.          Troy Mayen MD

## 2023-11-11 NOTE — GROUP NOTE
Group Topic: Excercise/Physical    Group Date: 11/11/2023  Start Time: 1015  End Time: 1045  Facilitators: JENNIFER Martin   Department: [facility] REH[facility] VIRTUAL    Number of Participants: 4   Group Focus: mindfulness, other physical exercise, and relaxation, self care  Treatment Modality: Other: Recreation Therapy  Interventions utilized were group exercise and other mindfulness, breathing techniques  Purpose: other: improve balance, increase stimulation, decrease stress,  elevate mood, self care    Name: Madalyn Ricci YOB: 1940   MR: [id number]      Facilitator: Recreational Therapist  Level of Participation: active  Quality of Participation: appropriate/pleasant, cooperative, and engaged  Interactions with others: appropriate  Mood/Affect: appropriate  Triggers (if applicable): n/a  Cognition: coherent/clear  Progress: Moderate  Comments: pt problem is depressed mood  Plan: continue with services

## 2023-11-11 NOTE — PROGRESS NOTES
Physical Therapy       11/11/23 7920-8331   PT  Visit   PT Received On 11/11/23   Response to Previous Treatment Patient with no complaints from previous session.   General   Reason for Referral impaired mobility   Referred By Dr. Brooke   Past Medical History Relevant to Rehab HTN, hyperlipidemia, GERD, DM, neuropathy   Prior to Session Communication Bedside nurse   Patient Position Received Up in chair   Preferred Learning Style auditory;verbal   General Comment pleasant, cooperative, motivated for PT   Precautions   Medical Precautions Fall precautions   Therapeutic Exercise   Therapeutic Exercise Activity 1 Standing heel raises 1x15   Therapeutic Exercise Activity 2 Standing hip abduction 1x 15 at RW   Therapeutic Exercise Activity 3 Standing hip flexion 1x15 reps   Therapeutic Activity   Therapeutic Activity Performed Yes   Therapeutic Activity 1 sit to stands 1x15 reps no UE support with close Supervision   Bed Mobility 1   Bed Mobility 1 Supine to sitting;Sitting to supine   Level of Assistance 1 Independent   Ambulation/Gait Training 1   Surface 1 Level tile   Device 1 Rolling walker   Assistance 1 Independent   Quality of Gait 1 Wide base of support   Comments/Distance (ft) 1 150 ft with RW   Transfer 1   Technique 1 Sit to stand;Stand to sit   Transfer Level of Assistance 1 Independent   Activity Tolerance   Endurance Tolerates 30 min exercise with multiple rests   PT Assessment   PT Assessment Results Decreased strength;Decreased endurance;Impaired balance;Decreased mobility;Decreased safety awareness   Rehab Prognosis Good   Evaluation/Treatment Tolerance Patient tolerated treatment well   Medical Staff Made Aware Yes   Strengths Ability to acquire knowledge   End of Session Communication Bedside nurse   Assessment Comment Making improvements, plan for discharge on Monday.   End of Session Patient Position Up in chair   Education   Individual(s) Educated Patient   Education Provided Fall Risk   PT Plan    Inpatient/Swing Bed or Outpatient Inpatient   PT Plan   Treatment/Interventions Transfer training;Gait training   PT Plan Skilled PT   PT Frequency 4 times per week   PT Discharge Recommendations Low intensity level of continued care   Equipment Recommended upon Discharge Wheeled walker   PT Recommended Transfer Status Stand by assist  (wheeled walker)   PT - OK to Discharge Yes      11/11/23 1340   [facility] Basic Mobility   Turning from your back to your side while in a flat bed without using bedrails 4   Moving from lying on your back to sitting on the side of a flat bed without using bedrails 4   Moving to and from bed to chair (including a wheelchair) 3   Standing up from a chair using your arms (e.g. wheelchair or bedside chair) 3   To walk in hospital room 3   Climbing 3-5 steps with railing 3   Basic Mobility - Total Score 20

## 2023-11-11 NOTE — NURSING NOTE
WISAM NOTE     Problem:  Depression     Behavior:  Pt out in the day room with peers watching TV. She interacts with peers when approached.     Group Participation: No HS group.   Appetite/Meals: good       Interventions:  Diabetic education for night time snacks. Medicated per MD orders.     Response:  Pt receptive and indicated understanding.      Plan:  Continue to monitor for safety and hypoglycemia.

## 2023-11-11 NOTE — CARE PLAN
Problem: Fall/Injury  Goal: Not fall by end of shift  Outcome: Met  Goal: Be free from injury by end of the shift  Outcome: Met  Goal: Use assistive devices by end of the shift  Outcome: Met   The patient's goals for the shift include no falls/d/c    The clinical goals for the shift include no falls    Over the shift, the patient did not make progress toward the following goals.

## 2023-11-12 LAB
GLUCOSE BLD MANUAL STRIP-MCNC: 132 MG/DL (ref 74–99)
GLUCOSE BLD MANUAL STRIP-MCNC: 137 MG/DL (ref 74–99)
GLUCOSE BLD MANUAL STRIP-MCNC: 172 MG/DL (ref 74–99)
GLUCOSE BLD MANUAL STRIP-MCNC: 86 MG/DL (ref 74–99)

## 2023-11-12 PROCEDURE — 99232 SBSQ HOSP IP/OBS MODERATE 35: CPT | Performed by: STUDENT IN AN ORGANIZED HEALTH CARE EDUCATION/TRAINING PROGRAM

## 2023-11-12 PROCEDURE — 2500000004 HC RX 250 GENERAL PHARMACY W/ HCPCS (ALT 636 FOR OP/ED): Performed by: INTERNAL MEDICINE

## 2023-11-12 PROCEDURE — 2500000001 HC RX 250 WO HCPCS SELF ADMINISTERED DRUGS (ALT 637 FOR MEDICARE OP): Performed by: PSYCHIATRY & NEUROLOGY

## 2023-11-12 PROCEDURE — 82947 ASSAY GLUCOSE BLOOD QUANT: CPT

## 2023-11-12 PROCEDURE — 2500000001 HC RX 250 WO HCPCS SELF ADMINISTERED DRUGS (ALT 637 FOR MEDICARE OP): Performed by: INTERNAL MEDICINE

## 2023-11-12 PROCEDURE — 99232 SBSQ HOSP IP/OBS MODERATE 35: CPT | Performed by: INTERNAL MEDICINE

## 2023-11-12 PROCEDURE — 1140000001 HC PRIVATE PSYCH ROOM DAILY

## 2023-11-12 RX ADMIN — CARVEDILOL 25 MG: 12.5 TABLET, FILM COATED ORAL at 07:45

## 2023-11-12 RX ADMIN — Medication 400 MG: at 09:15

## 2023-11-12 RX ADMIN — ATORVASTATIN CALCIUM 40 MG: 40 TABLET, FILM COATED ORAL at 20:43

## 2023-11-12 RX ADMIN — LOSARTAN POTASSIUM 100 MG: 50 TABLET, FILM COATED ORAL at 09:15

## 2023-11-12 RX ADMIN — ICOSAPENT ETHYL 1 G: 1 CAPSULE ORAL at 07:45

## 2023-11-12 RX ADMIN — LATANOPROST 1 DROP: 50 SOLUTION OPHTHALMIC at 20:43

## 2023-11-12 RX ADMIN — VENLAFAXINE HYDROCHLORIDE 150 MG: 150 CAPSULE, EXTENDED RELEASE ORAL at 09:15

## 2023-11-12 RX ADMIN — INSULIN GLARGINE 35 UNITS: 100 INJECTION, SOLUTION SUBCUTANEOUS at 20:43

## 2023-11-12 RX ADMIN — CARVEDILOL 25 MG: 12.5 TABLET, FILM COATED ORAL at 16:54

## 2023-11-12 RX ADMIN — AMLODIPINE BESYLATE 10 MG: 10 TABLET ORAL at 09:15

## 2023-11-12 RX ADMIN — PANTOPRAZOLE SODIUM 40 MG: 40 TABLET, DELAYED RELEASE ORAL at 07:45

## 2023-11-12 RX ADMIN — ICOSAPENT ETHYL 1 G: 1 CAPSULE ORAL at 16:54

## 2023-11-12 ASSESSMENT — PAIN SCALES - GENERAL
PAINLEVEL_OUTOF10: 0 - NO PAIN
PAINLEVEL_OUTOF10: 0 - NO PAIN

## 2023-11-12 ASSESSMENT — PAIN - FUNCTIONAL ASSESSMENT
PAIN_FUNCTIONAL_ASSESSMENT: 0-10
PAIN_FUNCTIONAL_ASSESSMENT: 0-10

## 2023-11-12 NOTE — PROGRESS NOTES
Patient is an 84yo female admitted for Mood Disorder.    Case discussed in morning team.    Vitals signs reviewed  Patient case was discussed with nursing staff.  Reports good mood, sleep, appetite. Taking meds as ordered. No prns required. She is attending groups.  She is participating in PT/OT.  Patient is anticipating discharge Monday as assisted living says they can't accept until then.- She is looking forward to discharge.    Past Medical History  She has a past medical history of Diabetes mellitus (CMS/HCC) and Hypertension.    Past Psychiatric History:   Previous therapy: no  Previous psychiatric treatment and medication trials: no  Previous psychiatric hospitalizations: no  Previous diagnoses: no  Previous suicide attempts: no  History of violence: no  Currently in treatment with N/A.  Depression screening was performed with standardized tool: Yes, Depression on admission    Surgical History  She has a past surgical history that includes Lung lobectomy (10/13/2015); Hysterectomy (10/13/2015); Cholecystectomy (10/13/2015); Appendectomy (10/13/2015); Hemicolectomy (10/13/2015); Cataract extraction (10/13/2015); and CT guided imaging for needle placement (2/4/2015).     Social History  She reports that she has never smoked. She has never used smokeless tobacco. No history on file for alcohol use and drug use.     Allergies  Patient has no known allergies.    Review of Systems    Psychiatric ROS - Adult  Anxiety: Mild  Depression: negative  Delirium: negative  Psychosis: negative  Margret: negative  Safety Issues: none  Psychiatric ROS Comment: The patient states that she is not suicidal,  and denies AVH. The patient states that she is not depressed and the only concern now is a caring and safe environment for her to live in.    Physical Exam      Mental Status Exam  General: NAD  Appearance: Hospital attire  Attitude: pleasant  Behavior: cooperative  Motor Activity: using walker  Speech: normal rate and  "volume  Mood: \"good\"  Affect: more open  Thought Process: more organized  Thought Content: no delusions elicited, denies SI/HI  Thought Perception: denies AH/VH/paranoia  Cognition: fair  Insight: fair  Judgement: fair    Psychiatric Risk Assessment  Violence Risk Assessment: none  Acute Risk of Harm to Others is Considered: low   Suicide Risk Assessment: age > 65 yrs old, , chronic medical illness, history of trauma or abuse, and living alone or lack of social support  Protective Factors against Suicide: fear of suicide and [employer] affiliation/spirituality  Acute Risk of Harm to Self is Considered: low    Last Recorded Vitals  Blood pressure (!) 128/93, pulse 84, temperature 36.1 °C (97 °F), temperature source Temporal, resp. rate 17, height 1.626 m (5' 4.02\"), weight 127 kg (278 lb 15.9 oz), SpO2 99 %.    Relevant Results      Current Facility-Administered Medications:     acetaminophen (Tylenol) tablet 650 mg, 650 mg, oral, q4h PRN, Navin Brooke DO, 650 mg at 10/29/23 0755    alum-mag hydroxide-simeth (Mylanta) 200-200-20 mg/5 mL oral suspension 30 mL, 30 mL, oral, q6h PRN, Navin Brooke DO    amLODIPine (Norvasc) tablet 10 mg, 10 mg, oral, Daily, Osiel Forman MD, 10 mg at 11/11/23 0841    atorvastatin (Lipitor) tablet 40 mg, 40 mg, oral, Nightly, Osiel Forman MD, 40 mg at 11/11/23 2028    carvedilol (Coreg) tablet 25 mg, 25 mg, oral, BID with meals, Osiel Forman MD, 25 mg at 11/12/23 0745    dextrose 10 % in water (D10W) infusion, 0.3 g/kg/hr, intravenous, Once PRN, Osiel Forman MD    dextrose 50 % injection 25 g, 25 g, intravenous, q15 min PRN, Osiel Forman MD    diphenhydrAMINE (BENADryl) capsule 50 mg, 50 mg, oral, q6h PRN **OR** diphenhydrAMINE (BENADryl) injection 50 mg, 50 mg, intramuscular, Once PRN, Navin Brooke DO    glucagon (Glucagen) injection 1 mg, 1 mg, intramuscular, q15 min PRN, Osiel Forman MD    icosapent ethyL (Vascepa) " capsule 1 g, 1 g, oral, BID with meals, Osiel Forman MD, 1 g at 11/12/23 0745    insulin glargine (Lantus) injection 35 Units, 35 Units, subcutaneous, Nightly, Osiel Forman MD, 35 Units at 11/11/23 2028    latanoprost (Xalatan) 0.005 % ophthalmic solution 1 drop, 1 drop, Both Eyes, Nightly, Osiel Forman MD, 1 drop at 11/11/23 2028    losartan (Cozaar) tablet 100 mg, 100 mg, oral, Daily, Osiel Forman MD, 100 mg at 11/11/23 0841    magnesium hydroxide (Milk of Magnesia) 400 mg/5 mL suspension 30 mL, 30 mL, oral, Daily PRN, Navin Brooke DO    magnesium oxide (Mag-Ox) tablet 400 mg, 400 mg, oral, Daily, Osiel Forman MD, 400 mg at 11/11/23 0842    OLANZapine (ZyPREXA) tablet 5 mg, 5 mg, oral, q6h PRN **OR** OLANZapine (ZyPREXA) injection 5 mg, 5 mg, intramuscular, q6h PRN, Navin Brooke DO    pantoprazole (ProtoNix) EC tablet 40 mg, 40 mg, oral, Daily before breakfast, Osiel Forman MD, 40 mg at 11/12/23 0745    traZODone (Desyrel) tablet 25 mg, 25 mg, oral, Nightly PRN, Navin Brooke DO    venlafaxine XR (Effexor-XR) 24 hr capsule 150 mg, 150 mg, oral, Daily, Navin Brooke DO, 150 mg at 11/11/23 0842      Assessment/Plan   Principal Problem:    Mood disorder   Active Problems:    Hyperlipidemia    GERD (gastroesophageal reflux disease)    Type 2 diabetes mellitus without complication, with long-term current use of insulin (CMS/[facility])    Primary hypertension    Continue meds as ordered  Continue daily rounds.   Continue to encourage scheduled medication. Patient cont to deny SI.  Says mood is good and she is looking forward to discharge.  Encourage participation in group therapy- goes to every group.  Appreciate social work arranging placement in assisted living - patient will be discharged tomorrow if facility accepts. Assisted living will not receive patient until Monday.           Medication Consent  Medication Consent: risks, benefits, side effects reviewed  for all ordered medications    I spent 30  minutes in the professional and overall care of this patient.          Troy Mayen MD

## 2023-11-12 NOTE — GROUP NOTE
Group Topic: Other   Group Date: 11/12/2023  Start Time: 1040  End Time: 1115  Facilitators: JENNIFER Martin   Department: [facility] REHABTH VIRTUAL    Number of Participants: 4   Group Focus: other story telling, increase memory, and social skills  Treatment Modality: Other: Recreation Therapy  Interventions utilized were group exercise, mental fitness, orientation, and story telling, creativity  Purpose: other: increase socialization, increase stimulation, increase creativity    Name: Madalyn Ricci YOB: 1940   MR: [id number]      Facilitator: Recreational Therapist  Level of Participation: active  Quality of Participation: appropriate/pleasant, cooperative, and engaged  Interactions with others: appropriate  Mood/Affect: appropriate and bright  Triggers (if applicable): n/a  Cognition: coherent/clear  Progress: Moderate  Comments: pt problem is delusional thought  Plan: continue with services

## 2023-11-12 NOTE — PROGRESS NOTES
[facility]: [facility]  PROGRESS NOTE      Madalyn Ricci is a 83 y.o. female that is being seen  today for follow-up at [facility].  No chief complaint on file..  Subjective   Patient is being seen for follow-up of [facility].  Patient's blood pressure has been better today,   No recent falls or episodes of agitation.  Patient is usually in the wheelchair since she has difficulty with walking.  Denies any other symptoms.    ROS  Negative for fever or chills  Negative for sore throat, ear pain, nasal discharge  Negative for cough, shortness of breath or wheezing  Negative for chest pain, palpitations, swelling of legs  Negative for abdominal pain, constipation, diarrhea, blood in the stools  Negative for urinary complaints  Negative for headache, dizziness, weakness or numbness  Negative for joint pain.  Positive for difficulty in walking  Positive for anxiety  All other systems reviewed and were negative   Vitals:    11/12/23 0915   BP: 128/64   Pulse: 72   Resp: 18   Temp: 36.4 °C (97.5 °F)   SpO2: 97%      Physical Exam  Constitutional: Patient does not appear to be in any acute distress  Head and Face: NCAT  Eyes: Normal external exam, EOMI  ENT: Normal external inspection of ears and nose. Oropharynx normal.  Cardiovascular: RRR, S1/S2, no murmurs, rubs, or gallops, radial pulses +2, no edema of extremities  Pulmonary: CTAB, no respiratory distress.  Abdomen: +BS, soft, non-tender, nondistended, no guarding or rebound, no masses noted  MSK: No joint swelling, normal movements of all extremities. Range of motion- normal.  Skin- No lesions, contusions, or erythema.  Peripheral puslses palpable bilaterally 2+  Neuro: AAO X3, Cranial nerves 2-12 grossly intact,DTR 2+ in all 4 limbs   Psychiatric: Judgment intact. Appropriate mood and behavior    Diagnostic Results   Lab Results   Component Value Date    GLUCOSE 199 (H) 10/16/2023    CALCIUM 9.3 10/16/2023     10/16/2023    K 3.7  10/16/2023    CO2 26 10/16/2023     (H) 10/16/2023    BUN 31 (H) 10/16/2023    CREATININE 1.20 10/16/2023     Lab Results   Component Value Date    ALT 11 10/16/2023    AST 16 10/16/2023    ALKPHOS 74 10/16/2023    BILITOT 0.3 10/16/2023     Lab Results   Component Value Date    WBC 6.4 10/16/2023    HGB 14.1 10/16/2023    HCT 44.1 10/16/2023    MCV 90 10/16/2023     10/16/2023     Lab Results   Component Value Date    CHOL 138 06/26/2020    CHOL 142 09/20/2019    CHOL 131 (L) 03/05/2019     Lab Results   Component Value Date    HDL 36 (L) 06/26/2020    HDL 36 (L) 09/20/2019    HDL 37 (L) 03/05/2019     Lab Results   Component Value Date    LDLCALC 44 (L) 06/26/2020    LDLCALC 36 (L) 09/20/2019    LDLCALC 49 (L) 03/05/2019     Lab Results   Component Value Date    TRIG 291 (H) 06/26/2020    TRIG 350 (H) 09/20/2019    TRIG 224 (H) 03/05/2019     Lab Results   Component Value Date    HGBA1C 6.4 (H) 12/12/2020     Other labs not included in the list above were reviewed either before or during this encounter.    History    Past Medical History:   Diagnosis Date    Diabetes mellitus (CMS/HCC)     Hypertension      Past Surgical History:   Procedure Laterality Date    APPENDECTOMY  10/13/2015    Appendectomy    CATARACT EXTRACTION  10/13/2015    Cataract Surgery    CHOLECYSTECTOMY  10/13/2015    Cholecystectomy    CT GUIDED IMAGING FOR NEEDLE PLACEMENT  2/4/2015    CT GUIDED IMAGING FOR NEEDLE PLACEMENT [facility]    HEMICOLECTOMY  10/13/2015    Hemicolectomy    HYSTERECTOMY  10/13/2015    Hysterectomy    LUNG LOBECTOMY  10/13/2015    Lung Lobectomy     No family history on file.  No Known Allergies  No current facility-administered medications on file prior to encounter.     Current Outpatient Medications on File Prior to Encounter   Medication Sig Dispense Refill    amLODIPine (Norvasc) 10 mg tablet Take 1 tablet (10 mg) by mouth once daily.      atorvastatin (Lipitor) 40 mg tablet Take 1 tablet  "(40 mg) by mouth once daily.      carvedilol (Coreg) 25 mg tablet Take 1 tablet (25 mg) by mouth 2 times a day with meals.      [] cefdinir (Omnicef) 300 mg capsule Take 1 capsule (300 mg) by mouth 2 times a day for 7 days. 14 capsule 0    icosapent ethyL (Vascepa) 1 gram capsule Take 1 capsule (1 g) by mouth 2 times a day with meals.      insulin aspart (NovoLOG U-100 Insulin aspart) 100 unit/mL injection Inject 100 Units under the skin 3 times a day before meals. Take as directed per insulin instructions.      insulin degludec (Tresiba FlexTouch U-200) 200 unit/mL (3 mL) injection Inject 40 Units under the skin once daily at bedtime. Take as directed per insulin instructions.      latanoprost (Xalatan) 0.005 % ophthalmic solution Administer 1 drop into both eyes once daily at bedtime.      losartan (Cozaar) 50 mg tablet Take 2 tablets (100 mg) by mouth once daily.      magnesium oxide (Mag-Ox) 400 mg tablet Take 1 tablet (400 mg) by mouth once daily.      omeprazole (PriLOSEC) 40 mg DR capsule Take 1 capsule (40 mg) by mouth once daily in the morning. Take before meals. Do not crush or chew.      pen needle, diabetic (BD Traci 2nd Gen Pen Needle) 32 gauge x 5/32\" needle       potassium chloride ER (Micro-K) 10 mEq ER capsule Take 2 capsules (20 mEq) by mouth once daily. Do not crush or chew.      venlafaxine XR (Effexor-XR) 150 mg 24 hr capsule Take 1 capsule (150 mg) by mouth once daily. Do not crush or chew.      [DISCONTINUED] ferrous sulfate 325 (65 Fe) MG EC tablet Take 65 mg by mouth 3 times a day with meals. Do not crush, chew, or split.      [DISCONTINUED] fluticasone propion-salmeteroL (Advair) 115-21 mcg/actuation inhaler Inhale 2 puffs 2 times a day. Rinse mouth with water after use to reduce aftertaste and incidence of candidiasis. Do not swallow.      [DISCONTINUED] hydroCHLOROthiazide (HYDRODiuril) 50 mg tablet Take 1 tablet (50 mg) by mouth once daily.      [DISCONTINUED] vitamin E 450 mg " (1000 unit) capsule Take 100 Units by mouth once daily.         There is no immunization history on file for this patient.  Patient's medical history was reviewed and updated either before or during this encounter.  ASSESSMENT / PLAN:  Active Hospital Problems    Hyperlipidemia      GERD (gastroesophageal reflux disease)      Type 2 diabetes mellitus without complication, with long-term current use of insulin (CMS/[facility])      Primary hypertension      *Mood disorder (CMS/[facility])    Patient's blood pressure is better .  Blood sugars are better.No episode of hypoglycemia    denies any significant complaints.  Patient is on statins.  Patient is being seen by geropsych team.        Osiel Forman MD

## 2023-11-12 NOTE — NURSING NOTE
WISAM NOTE     Problem:  Depression     Behavior:  Patient is sitting in a chair in the group room watching television. Patient is pleasant and calm. Patient's affect is broad range. Patient is talkative. Patient maintains good eye contact.    Group Participation: N/A  Appetite/Meals: N/A       Interventions:  This nurse completed a shift assessment and administered patient's scheduled night time medications.    Response:  Patient remained pleasant and calm and was cooperative throughout this shift assessment and medication administration.     Plan:  Continue to monitor for depression. Continue to monitor for patient safety

## 2023-11-12 NOTE — CARE PLAN
The patient's goals for the shift include No falls    The clinical goals for the shift include No fall by end of shift    Recommendations to address these barriers include continuing to educate the pt on monitoring blood sugars on a regular base, and learning the warning signs of hyper and hypoglycemia.

## 2023-11-12 NOTE — NURSING NOTE
WISAM NOTE     Problem:  Suicidal Ideation     Behavior:  Pt upon assessment was sitting in hallway awaiting the breakfast trays to arrive on the unit. Pt voices no pain at this current time and rates is a 0/10 when asked by staff members. Pt reports no feelings of SI at this time this and voices that she is looking forward to possibly discharging on Monday. Pt educated on the importance of being medication compliant at this time and provided with morning medications. Pt encouraged to alert staff if safety can no longer be agree upon while on unit. Pt voices no other concerns to staff at this time. Staff will continue to monitor pt Q 15 minute for mental status changes.      Group Participation: Attendings all unit programing and actively participates in groups.    Appetite/Meals: 100/100/100        Interventions:  This nurse completed a shift assessment and administered patient's scheduled night time medications.     Response:  Patient remained pleasant and calm and was cooperative throughout this shift assessment and medication administration. Pt participating in group and being social with peers while on unit.      Plan:  Continue to monitor for patient safety and educate when needed

## 2023-11-12 NOTE — NURSING NOTE
WISAM NOTE     Problem:  Falls      Behavior:  Pt upon assessment was sitting in bed at side of bed at this time. Pt voices that heard the nurse come and knew that she would have medications to take. Pt denies having any SI or any depression feelings at this time. Pt voices having slight left knee pain at this time and rates it a 2/10. Pt declines wanting any medications for the pain at this time. Pt voices that she believe once she starts moving it will go away. Pt educated at this time on ways to reduce falls within a hospital setting. Pt verbally able to teach back two ways at this time to this writer. Pt currently walks with walker, but has steady strong gait. Pt encouraged to notify staff of any concerns and or questions as they arise throughout shift. Pt notes no other concerns to this writer at this time.      Group Participation: Attendings all unit programing and actively participates in groups.    Appetite/Meals: 100/100/100        Interventions:  This nurse completed a shift assessment and administered patient's scheduled medications.     Response:  Patient remained pleasant and calm and was cooperative throughout this shift assessment and medication administration. Pt participating in group and being social with peers while on unit.      Plan:  Continue to monitor for patient safety and educate when needed

## 2023-11-12 NOTE — GROUP NOTE
Group Topic: Other   Group Date: 11/12/2023  Start Time: 1115  End Time: 1135  Facilitators: JENNIFER Martin   Department: [facility]    Number of Participants: 3   Group Focus: other memory , reminiscence, and social skills  Treatment Modality: Other: Recreation Therapy  Interventions utilized were group exercise and reminiscence  Purpose: other: increase memory, increase socialization, increase stimulation, fun    Name: Madalyn Ricci YOB: 1940   MR: [id number]      Facilitator: Recreational Therapist  Level of Participation: active  Quality of Participation: appropriate/pleasant, cooperative, and engaged  Interactions with others: appropriate  Mood/Affect: appropriate  Triggers (if applicable): n/a  Cognition: coherent/clear  Progress: Moderate  Comments: pt problem is delusional thought  Plan: continue with services

## 2023-11-12 NOTE — GROUP NOTE
Group Topic: Excercise/Physical    Group Date: 11/12/2023  Start Time: 1020  End Time: 1040  Facilitators: JENNIFER Martin   Department: [facility]    Number of Participants: 4   Group Focus: mindfulness, physical exercise, relaxation, self care  Treatment Modality: Other: Recreation Therapy  Interventions utilized were group exercise and other mindfulness, breathing techniques  Purpose: other: improve balance, increase stimulation, increase mood, self care    Name: Madalyn Ricci YOB: 1940   MR: [id number]      Facilitator: Recreational Therapist  Level of Participation: active  Quality of Participation: appropriate/pleasant, cooperative, and engaged  Interactions with others: appropriate  Mood/Affect: appropriate  Triggers (if applicable): n/a  Cognition: coherent/clear  Progress: Moderate  Comments: pt problem is depressed mood  Plan: continue with services

## 2023-11-13 LAB
GLUCOSE BLD MANUAL STRIP-MCNC: 153 MG/DL (ref 74–99)
GLUCOSE BLD MANUAL STRIP-MCNC: 155 MG/DL (ref 74–99)
GLUCOSE BLD MANUAL STRIP-MCNC: 164 MG/DL (ref 74–99)
GLUCOSE BLD MANUAL STRIP-MCNC: 171 MG/DL (ref 74–99)

## 2023-11-13 PROCEDURE — 97116 GAIT TRAINING THERAPY: CPT | Mod: GP

## 2023-11-13 PROCEDURE — 82947 ASSAY GLUCOSE BLOOD QUANT: CPT

## 2023-11-13 PROCEDURE — 2500000001 HC RX 250 WO HCPCS SELF ADMINISTERED DRUGS (ALT 637 FOR MEDICARE OP): Performed by: INTERNAL MEDICINE

## 2023-11-13 PROCEDURE — 2500000004 HC RX 250 GENERAL PHARMACY W/ HCPCS (ALT 636 FOR OP/ED): Performed by: INTERNAL MEDICINE

## 2023-11-13 PROCEDURE — 97110 THERAPEUTIC EXERCISES: CPT | Mod: GP

## 2023-11-13 PROCEDURE — 1140000001 HC PRIVATE PSYCH ROOM DAILY

## 2023-11-13 PROCEDURE — 99232 SBSQ HOSP IP/OBS MODERATE 35: CPT | Performed by: INTERNAL MEDICINE

## 2023-11-13 PROCEDURE — 99232 SBSQ HOSP IP/OBS MODERATE 35: CPT | Performed by: PSYCHIATRY & NEUROLOGY

## 2023-11-13 PROCEDURE — 2500000001 HC RX 250 WO HCPCS SELF ADMINISTERED DRUGS (ALT 637 FOR MEDICARE OP): Performed by: PSYCHIATRY & NEUROLOGY

## 2023-11-13 RX ADMIN — LATANOPROST 1 DROP: 50 SOLUTION OPHTHALMIC at 20:08

## 2023-11-13 RX ADMIN — ATORVASTATIN CALCIUM 40 MG: 40 TABLET, FILM COATED ORAL at 20:08

## 2023-11-13 RX ADMIN — ICOSAPENT ETHYL 1 G: 1 CAPSULE ORAL at 17:09

## 2023-11-13 RX ADMIN — PANTOPRAZOLE SODIUM 40 MG: 40 TABLET, DELAYED RELEASE ORAL at 08:15

## 2023-11-13 RX ADMIN — CARVEDILOL 25 MG: 12.5 TABLET, FILM COATED ORAL at 08:15

## 2023-11-13 RX ADMIN — Medication 400 MG: at 08:15

## 2023-11-13 RX ADMIN — ICOSAPENT ETHYL 1 G: 1 CAPSULE ORAL at 08:14

## 2023-11-13 RX ADMIN — LOSARTAN POTASSIUM 100 MG: 50 TABLET, FILM COATED ORAL at 08:15

## 2023-11-13 RX ADMIN — AMLODIPINE BESYLATE 10 MG: 10 TABLET ORAL at 08:15

## 2023-11-13 RX ADMIN — INSULIN GLARGINE 35 UNITS: 100 INJECTION, SOLUTION SUBCUTANEOUS at 20:05

## 2023-11-13 RX ADMIN — CARVEDILOL 25 MG: 12.5 TABLET, FILM COATED ORAL at 17:08

## 2023-11-13 RX ADMIN — VENLAFAXINE HYDROCHLORIDE 150 MG: 150 CAPSULE, EXTENDED RELEASE ORAL at 08:15

## 2023-11-13 ASSESSMENT — PAIN SCALES - GENERAL
PAINLEVEL_OUTOF10: 0 - NO PAIN

## 2023-11-13 ASSESSMENT — COGNITIVE AND FUNCTIONAL STATUS - GENERAL
CLIMB 3 TO 5 STEPS WITH RAILING: A LITTLE
MOBILITY SCORE: 23

## 2023-11-13 ASSESSMENT — COLUMBIA-SUICIDE SEVERITY RATING SCALE - C-SSRS
6. HAVE YOU EVER DONE ANYTHING, STARTED TO DO ANYTHING, OR PREPARED TO DO ANYTHING TO END YOUR LIFE?: NO
2. HAVE YOU ACTUALLY HAD ANY THOUGHTS OF KILLING YOURSELF?: NO
6. HAVE YOU EVER DONE ANYTHING, STARTED TO DO ANYTHING, OR PREPARED TO DO ANYTHING TO END YOUR LIFE?: NO
2. HAVE YOU ACTUALLY HAD ANY THOUGHTS OF KILLING YOURSELF?: NO
1. SINCE LAST CONTACT, HAVE YOU WISHED YOU WERE DEAD OR WISHED YOU COULD GO TO SLEEP AND NOT WAKE UP?: NO
1. SINCE LAST CONTACT, HAVE YOU WISHED YOU WERE DEAD OR WISHED YOU COULD GO TO SLEEP AND NOT WAKE UP?: NO

## 2023-11-13 ASSESSMENT — PAIN - FUNCTIONAL ASSESSMENT
PAIN_FUNCTIONAL_ASSESSMENT: 0-10
PAIN_FUNCTIONAL_ASSESSMENT: 0-10

## 2023-11-13 NOTE — GROUP NOTE
Group Topic: Other   Group Date: 11/13/2023  Start Time: 0930  End Time: 1000  Facilitators: JENNIFER Gramajo   Department: [facility]    Number of Participants: 4   Group Focus: other What's up?  Treatment Modality: Other: Recreation therapy  Interventions utilized were exploration, mental fitness, reminiscence, and story telling  Purpose: feelings and other: increase attention, enhance self esteem, increase stimulation, elevate mood, stimulate memory, increase socialization    Name: Madalyn Ricci YOB: 1940   MR: [id number]      Facilitator: Recreational Therapist  Level of Participation: active  Quality of Participation: appropriate/pleasant, attentive, and cooperative  Interactions with others: appropriate  Mood/Affect: appropriate  Triggers (if applicable): n/a  Cognition: coherent/clear  Progress: Moderate  Comments: pt problem is depressed mood.  Plan: continue with services

## 2023-11-13 NOTE — PROGRESS NOTES
KAYCEE was able to speak with assisted living and was able to confirm pt is able to be discharge today. Contact confirmed pt has room set up and is furnished for pt to arrive today at 430 pm.    ALIXS spoke with [facility] to confirm pt is being discharged today as well as they confirmed pt is expected to transferred this afternoon to them.    ALIXS called assisted living  inquiring about how they handle medications at this time. KAYCEE had to leave  at this time.    KAYCEE called transportation through  and was able to scheduled transportation for  at 4:30 pm today.

## 2023-11-13 NOTE — PROGRESS NOTES
[facility]: [facility]  PROGRESS NOTE      Madalyn Ricci is a 83 y.o. female that is being seen  today for follow-up at [facility].  No chief complaint on file..  Subjective   Patient is being seen for follow-up of [facility].  Patient's blood pressure has been better today,   No recent falls or episodes of agitation.  Patient is usually in the wheelchair since she has difficulty with walking.  Denies any other symptoms.    ROS  Negative for fever or chills  Negative for sore throat, ear pain, nasal discharge  Negative for cough, shortness of breath or wheezing  Negative for chest pain, palpitations, swelling of legs  Negative for abdominal pain, constipation, diarrhea, blood in the stools  Negative for urinary complaints  Negative for headache, dizziness, weakness or numbness  Negative for joint pain.  Positive for difficulty in walking  Positive for anxiety  All other systems reviewed and were negative   Vitals:    11/13/23 0500   BP: 147/66   Pulse: 77   Resp: 18   Temp: 36.7 °C (98.1 °F)   SpO2: 96%      Physical Exam  Constitutional: Patient does not appear to be in any acute distress  Head and Face: NCAT  Eyes: Normal external exam, EOMI  ENT: Normal external inspection of ears and nose. Oropharynx normal.  Cardiovascular: RRR, S1/S2, no murmurs, rubs, or gallops, radial pulses +2, no edema of extremities  Pulmonary: CTAB, no respiratory distress.  Abdomen: +BS, soft, non-tender, nondistended, no guarding or rebound, no masses noted  MSK: No joint swelling, normal movements of all extremities. Range of motion- normal.  Skin- No lesions, contusions, or erythema.  Peripheral puslses palpable bilaterally 2+  Neuro: AAO X3, Cranial nerves 2-12 grossly intact,DTR 2+ in all 4 limbs   Psychiatric: Judgment intact. Appropriate mood and behavior    Diagnostic Results   Lab Results   Component Value Date    GLUCOSE 199 (H) 10/16/2023    CALCIUM 9.3 10/16/2023     10/16/2023    K 3.7  10/16/2023    CO2 26 10/16/2023     (H) 10/16/2023    BUN 31 (H) 10/16/2023    CREATININE 1.20 10/16/2023     Lab Results   Component Value Date    ALT 11 10/16/2023    AST 16 10/16/2023    ALKPHOS 74 10/16/2023    BILITOT 0.3 10/16/2023     Lab Results   Component Value Date    WBC 6.4 10/16/2023    HGB 14.1 10/16/2023    HCT 44.1 10/16/2023    MCV 90 10/16/2023     10/16/2023     Lab Results   Component Value Date    CHOL 138 06/26/2020    CHOL 142 09/20/2019    CHOL 131 (L) 03/05/2019     Lab Results   Component Value Date    HDL 36 (L) 06/26/2020    HDL 36 (L) 09/20/2019    HDL 37 (L) 03/05/2019     Lab Results   Component Value Date    LDLCALC 44 (L) 06/26/2020    LDLCALC 36 (L) 09/20/2019    LDLCALC 49 (L) 03/05/2019     Lab Results   Component Value Date    TRIG 291 (H) 06/26/2020    TRIG 350 (H) 09/20/2019    TRIG 224 (H) 03/05/2019     Lab Results   Component Value Date    HGBA1C 6.4 (H) 12/12/2020     Other labs not included in the list above were reviewed either before or during this encounter.    History    Past Medical History:   Diagnosis Date    Diabetes mellitus (CMS/HCC)     Hypertension      Past Surgical History:   Procedure Laterality Date    APPENDECTOMY  10/13/2015    Appendectomy    CATARACT EXTRACTION  10/13/2015    Cataract Surgery    CHOLECYSTECTOMY  10/13/2015    Cholecystectomy    CT GUIDED IMAGING FOR NEEDLE PLACEMENT  2/4/2015    CT GUIDED IMAGING FOR NEEDLE PLACEMENT [facility]    HEMICOLECTOMY  10/13/2015    Hemicolectomy    HYSTERECTOMY  10/13/2015    Hysterectomy    LUNG LOBECTOMY  10/13/2015    Lung Lobectomy     No family history on file.  No Known Allergies  No current facility-administered medications on file prior to encounter.     Current Outpatient Medications on File Prior to Encounter   Medication Sig Dispense Refill    amLODIPine (Norvasc) 10 mg tablet Take 1 tablet (10 mg) by mouth once daily.      atorvastatin (Lipitor) 40 mg tablet Take 1 tablet  "(40 mg) by mouth once daily.      carvedilol (Coreg) 25 mg tablet Take 1 tablet (25 mg) by mouth 2 times a day with meals.      [] cefdinir (Omnicef) 300 mg capsule Take 1 capsule (300 mg) by mouth 2 times a day for 7 days. 14 capsule 0    icosapent ethyL (Vascepa) 1 gram capsule Take 1 capsule (1 g) by mouth 2 times a day with meals.      insulin aspart (NovoLOG U-100 Insulin aspart) 100 unit/mL injection Inject 100 Units under the skin 3 times a day before meals. Take as directed per insulin instructions.      insulin degludec (Tresiba FlexTouch U-200) 200 unit/mL (3 mL) injection Inject 40 Units under the skin once daily at bedtime. Take as directed per insulin instructions.      latanoprost (Xalatan) 0.005 % ophthalmic solution Administer 1 drop into both eyes once daily at bedtime.      losartan (Cozaar) 50 mg tablet Take 2 tablets (100 mg) by mouth once daily.      magnesium oxide (Mag-Ox) 400 mg tablet Take 1 tablet (400 mg) by mouth once daily.      omeprazole (PriLOSEC) 40 mg DR capsule Take 1 capsule (40 mg) by mouth once daily in the morning. Take before meals. Do not crush or chew.      pen needle, diabetic (BD Traci 2nd Gen Pen Needle) 32 gauge x 5/32\" needle       potassium chloride ER (Micro-K) 10 mEq ER capsule Take 2 capsules (20 mEq) by mouth once daily. Do not crush or chew.      venlafaxine XR (Effexor-XR) 150 mg 24 hr capsule Take 1 capsule (150 mg) by mouth once daily. Do not crush or chew.      [DISCONTINUED] ferrous sulfate 325 (65 Fe) MG EC tablet Take 65 mg by mouth 3 times a day with meals. Do not crush, chew, or split.      [DISCONTINUED] fluticasone propion-salmeteroL (Advair) 115-21 mcg/actuation inhaler Inhale 2 puffs 2 times a day. Rinse mouth with water after use to reduce aftertaste and incidence of candidiasis. Do not swallow.      [DISCONTINUED] hydroCHLOROthiazide (HYDRODiuril) 50 mg tablet Take 1 tablet (50 mg) by mouth once daily.      [DISCONTINUED] vitamin E 450 mg " (1000 unit) capsule Take 100 Units by mouth once daily.         There is no immunization history on file for this patient.  Patient's medical history was reviewed and updated either before or during this encounter.  ASSESSMENT / PLAN:  Active Hospital Problems    Hyperlipidemia      GERD (gastroesophageal reflux disease)      Type 2 diabetes mellitus without complication, with long-term current use of insulin (CMS/[facility])      Primary hypertension      *Mood disorder (CMS/[facility])    Patient's blood pressure is better .  Blood sugars are better.No episode of hypoglycemia    denies any significant complaints.  Patient is on statins.  Patient is being seen by geropsych team.        Osiel Forman MD

## 2023-11-13 NOTE — PROGRESS NOTES
"Madalyn Ricci is a 83 y.o. female on day 27 of admission presenting with Mood disorder (CMS/HCC).      Subjective   Case discussed in treatment team and chart reviewed.      Patient remains discharge ready pending optimal facility arrangements.  Social work noted the patient cannot go to the facility today due to their own internal obstacles but we are looking at discharge for tomorrow.      Patient reports no concerns maintaining appropriate gains.  She reports her mood is good.  She denies thoughts of harming self, others, and remains without evidence of psychosis nor diann.  She attends groups and meals, makes needs known, cooperates fully with staff.         Objective     Last Recorded Vitals  Blood pressure 147/66, pulse 77, temperature 36.7 °C (98.1 °F), temperature source Oral, resp. rate 18, height 1.626 m (5' 4.02\"), weight 127 kg (278 lb 15.9 oz), SpO2 96 %.    Results for orders placed or performed during the hospital encounter of 10/17/23 (from the past 96 hour(s))   POCT GLUCOSE   Result Value Ref Range    POCT Glucose 134 (H) 74 - 99 mg/dL   POCT GLUCOSE   Result Value Ref Range    POCT Glucose 208 (H) 74 - 99 mg/dL   POCT GLUCOSE   Result Value Ref Range    POCT Glucose 75 74 - 99 mg/dL   POCT GLUCOSE   Result Value Ref Range    POCT Glucose 161 (H) 74 - 99 mg/dL   POCT GLUCOSE   Result Value Ref Range    POCT Glucose 177 (H) 74 - 99 mg/dL   POCT GLUCOSE   Result Value Ref Range    POCT Glucose 92 74 - 99 mg/dL   POCT GLUCOSE   Result Value Ref Range    POCT Glucose 155 (H) 74 - 99 mg/dL   POCT GLUCOSE   Result Value Ref Range    POCT Glucose 137 (H) 74 - 99 mg/dL   POCT GLUCOSE   Result Value Ref Range    POCT Glucose 197 (H) 74 - 99 mg/dL   POCT GLUCOSE   Result Value Ref Range    POCT Glucose 86 74 - 99 mg/dL   POCT GLUCOSE   Result Value Ref Range    POCT Glucose 132 (H) 74 - 99 mg/dL   POCT GLUCOSE   Result Value Ref Range    POCT Glucose 172 (H) 74 - 99 mg/dL   POCT GLUCOSE   Result Value Ref " "Range    POCT Glucose 137 (H) 74 - 99 mg/dL   POCT GLUCOSE   Result Value Ref Range    POCT Glucose 153 (H) 74 - 99 mg/dL   POCT GLUCOSE   Result Value Ref Range    POCT Glucose 164 (H) 74 - 99 mg/dL         Review of Systems    Psychiatric ROS - Adult  Anxiety: Negative  Depression: negative  Delirium: negative  Psychosis: negative  Margret: negative  Safety Issues: none  Psychiatric ROS Comment: as noted    Physical Exam      Mental Status Exam  General: NAD  Appearance: Hospital attire  Attitude: pleasant  Behavior: cooperative  Motor Activity: using walker  Speech: normal rate and volume  Mood: \"good\"  Affect: more open  Thought Process: more organized  Thought Content: no delusions elicited, denies SI/HI  Thought Perception: denies AH/VH/paranoia  Cognition: fair  Insight: fair  Judgement: fair       Psychiatric Risk Assessment  Violence Risk Assessment: major mental illness  Acute Risk of Harm to Others is Considered: moderate   Suicide Risk Assessment: age > 65 yrs old, , and current psychiatric illness  Protective Factors against Suicide: adherence to  treatment, fear of social disapproval, hopefulness/future orientation, and positive family relationships  Acute Risk of Harm to Self is Considered: moderate    Relevant Results               Scheduled medications  amLODIPine, 10 mg, oral, Daily  atorvastatin, 40 mg, oral, Nightly  carvedilol, 25 mg, oral, BID with meals  icosapent ethyL, 1 g, oral, BID with meals  insulin glargine, 35 Units, subcutaneous, Nightly  latanoprost, 1 drop, Both Eyes, Nightly  losartan, 100 mg, oral, Daily  magnesium oxide, 400 mg, oral, Daily  pantoprazole, 40 mg, oral, Daily before breakfast  venlafaxine XR, 150 mg, oral, Daily      Continuous medications     PRN medications  PRN medications: acetaminophen, alum-mag hydroxide-simeth, dextrose 10 % in water (D10W), dextrose, diphenhydrAMINE **OR** diphenhydrAMINE, glucagon, magnesium hydroxide, OLANZapine **OR** OLANZapine, " traZODone      Assessment/Plan   Principal Problem:    Mood disorder (CMS/[facility])  Active Problems:    Hyperlipidemia    GERD (gastroesophageal reflux disease)    Type 2 diabetes mellitus without complication, with long-term current use of insulin (CMS/[facility])    Primary hypertension    Biological -     Continue (as this is planned discharge medication list):  Effexor xr 150 mg daily for depression     Discussion with patient regarding risk benefits and alternatives and side effects with opportunity to ask questions and questions answered.  Patient given consent to the plan as noted    2. Psychological -       Patient is encouraged to participate with the therapeutic milieu and program and group therapy      3. Sociological -      Patient encouraged to cooperate with social work staff on issues relevant to discharge planning  Discharge is planned for tomorrow - though as seen today, this could again change delaying egress from the facility and to the community waiting on external organizations to coordinate her acceptance and arrival  We remain in close contact with social work to follow up on this to discharge as soon as able.  These ongoing multiple discussions result in care delivered today remaining a level 2 note given the complexity of coordinating her discharge                I spent 25 minutes in the professional and overall care of this patient.    Parts of this chart have been completed using voice recognition software.  Please excuse any errors of transcription.  Despite the medical decision making time stamp, my medical decision making has taken place during the patient's entire visit.  Thought process and reason for plan has been formulated from the time that I saw the patient until the time of disposition and is not specific to one specific moment during their visit and furthermore the medical decision making encompasses the entire chart and not only that represented in this note.      Navin Brooke,  DO

## 2023-11-13 NOTE — PROGRESS NOTES
Physical Therapy Treatment Note        11/13/23 [id number]   PT  Visit   PT Received On 11/13/23   Response to Previous Treatment Patient with no complaints from previous session.   General   Reason for Referral impaired mobility   Referred By Dr. Brooke   Past Medical History Relevant to Rehab HTN, hyperlipidemia, GERD, DM, neuropathy   Patient Position Received Up in chair   Preferred Learning Style auditory;verbal   Precautions   Medical Precautions Fall precautions   Pain Assessment   Pain Assessment 0-10   Pain Score 0 - No pain   Patient's Stated Pain Goal No pain   Cognition   Overall Cognitive Status WFL   Orientation Level Oriented X4   Insight   (Concerned about her Discharge Plan)   Therapeutic Exercise   Therapeutic Exercise Performed Yes   Therapeutic Exercise Activity 1 Standing heel raises 2x15   Therapeutic Exercise Activity 2 Standing Hip Abduction  (1x15)   Therapeutic Exercise Activity 3 Standing Knee flexion  (1x15 2# BLE)   Therapeutic Exercise Activity 4 Seated Hamstring curls 1x15 green tband   Therapeutic Exercise Activity 5 Seated Hip flexion 1x15 2# BLE   Therapeutic Exercise Activity 6 Seated hip abd with green tband 2x15   Ambulation/Gait Training   Ambulation/Gait Training Performed Yes   Ambulation/Gait Training 1   Surface 1 Level tile   Device 1 Rolling walker   Gait Support Devices Gait belt   Assistance 1 Independent   Quality of Gait 1 Wide base of support   Comments/Distance (ft) 1 150x2   Ambulation/Gait Training 2   Surface 2 Level tile   Device 2 No device   Gait Support Devices   (occasional Thomas RAil 1 UE support)   Assistance 2 Contact guard   Quality of Gait 2 Wide base of support  (and lateral sway)   Comments/Distance (ft) 2 75x2  (Increased Fatigue)   Transfer 1   Transfer From 1 Sit to   Transfer to 1 Stand   Technique 1 Sit to stand;Stand to sit   Transfer Device 1 Walker   Transfer Level of Assistance 1 Independent   Trials/Comments 1 Safety cues   Transfers 2    Transfer From 2 Wheelchair to   Transfer to 2 Stand   Technique 2 Sit to stand;Stand to sit   Transfer Device 2   (Attempted without UE support)   Transfer Level of Assistance 2 Contact guard   Trials/Comments 2   (Multiple reps to attempts without push up.  Needed UE support)   PT Assessment   PT Assessment Results Decreased strength;Decreased endurance;Impaired balance;Decreased mobility;Decreased safety awareness   Rehab Prognosis Good   Evaluation/Treatment Tolerance Patient tolerated treatment well   Strengths Insight into problems  (Admits she has history of falls)   Assessment Comment Making improvements, plan for discharge on Monday.   PT Plan   Inpatient/Swing Bed or Outpatient Inpatient   PT Plan   Treatment/Interventions Transfer training;Gait training;Balance training;Strengthening;Endurance training;Therapeutic exercise   PT Plan Skilled PT   PT Frequency 4 times per week   PT Discharge Recommendations Low intensity level of continued care   Equipment Recommended upon Discharge Wheeled walker  (May need RW issued)   PT Recommended Transfer Status Independent      11/13/23 1400   [facility] Basic Mobility   Turning from your back to your side while in a flat bed without using bedrails 4   Moving from lying on your back to sitting on the side of a flat bed without using bedrails 4   Moving to and from bed to chair (including a wheelchair) 4   Standing up from a chair using your arms (e.g. wheelchair or bedside chair) 4   To walk in hospital room 4   Climbing 3-5 steps with railing 3   Basic Mobility - Total Score 23

## 2023-11-13 NOTE — GROUP NOTE
Group Topic: Music Therapy   Group Date: 11/13/2023  Start Time: 1000  End Time: 1100  Facilitators: Mandi Payan   Department: [facility] Ger[facility]     Number of Participants: 5   Group Focus: music therapy  Treatment Modality: Music Therapy  Interventions utilized were other active music making, music-guided relaxation,   Purpose: communication skills and self-care    Name: Madalyn Ricci YOB: 1940   MR: [id number]      Facilitator: Music Therapist  Level of Participation: moderate  Quality of Participation: appropriate/pleasant, attentive, and quiet  Interactions with others: appropriate and supportive  Mood/Affect: appropriate, brightens with interaction, and positive  Triggers (if applicable): n/a  Cognition: coherent/clear and goal directed  Progress: Moderate  Comments: Pt mentioned being discharged and appeared very happy telling MT    Plan: continue with services

## 2023-11-13 NOTE — GROUP NOTE
Group Topic: Other   Group Date: 11/13/2023  Start Time: 1100  End Time: 1135  Facilitators: JENNIFER Gramajo   Department: [facility] VIRT[facility]    Number of Participants: 4   Group Focus: reminiscence  Treatment Modality: Other: Recreation therapy  Interventions utilized were exploration and reminiscence  Purpose: feelings and other: increase attention, enhance self esteem, increase stimulation, elevate mood, stimulate memory, increase socialzation    Name: Madalyn Ricci YOB: 1940   MR: [id number]      Facilitator: Recreational Therapist  Level of Participation: active  Quality of Participation: appropriate/pleasant, attentive, cooperative, and engaged  Interactions with others: appropriate and gave feedback  Mood/Affect: appropriate  Triggers (if applicable): n/a  Cognition: coherent/clear  Progress: Moderate  Comments: pt problem is depressed mood.  Plan: continue with services

## 2023-11-13 NOTE — GROUP NOTE
Group Topic: Other   Group Date: 11/13/2023  Start Time: 1515  End Time: 1600  Facilitators: JENNIFER Gramajo   Department[facility]    Number of Participants: 6   Group Focus: other Let's talk  Treatment Modality: Other: Recreation Therapy  Interventions utilized were exploration, mental fitness, reminiscence, and story telling  Purpose: other: increase attention, enhance self esteem, increase stimulation, elevate mood, stimulate memory, increase socialization    Name: Madalyn Ricci YOB: 1940   MR: [id number]      Facilitator: Recreational Therapist  Level of Participation: active  Quality of Participation: appropriate/pleasant, attentive, and cooperative  Interactions with others: appropriate  Mood/Affect: appropriate  Triggers (if applicable): n/a  Cognition: coherent/clear  Progress: Significant  Comments: pt problem is depressed mood.  Plan: continue with services

## 2023-11-14 LAB
GLUCOSE BLD MANUAL STRIP-MCNC: 112 MG/DL (ref 74–99)
GLUCOSE BLD MANUAL STRIP-MCNC: 161 MG/DL (ref 74–99)
GLUCOSE BLD MANUAL STRIP-MCNC: 174 MG/DL (ref 74–99)
GLUCOSE BLD MANUAL STRIP-MCNC: 193 MG/DL (ref 74–99)

## 2023-11-14 PROCEDURE — 2500000004 HC RX 250 GENERAL PHARMACY W/ HCPCS (ALT 636 FOR OP/ED): Performed by: INTERNAL MEDICINE

## 2023-11-14 PROCEDURE — 99231 SBSQ HOSP IP/OBS SF/LOW 25: CPT | Performed by: PSYCHIATRY & NEUROLOGY

## 2023-11-14 PROCEDURE — 2500000001 HC RX 250 WO HCPCS SELF ADMINISTERED DRUGS (ALT 637 FOR MEDICARE OP): Performed by: INTERNAL MEDICINE

## 2023-11-14 PROCEDURE — 82947 ASSAY GLUCOSE BLOOD QUANT: CPT

## 2023-11-14 PROCEDURE — 1140000001 HC PRIVATE PSYCH ROOM DAILY

## 2023-11-14 PROCEDURE — 2500000001 HC RX 250 WO HCPCS SELF ADMINISTERED DRUGS (ALT 637 FOR MEDICARE OP): Performed by: PSYCHIATRY & NEUROLOGY

## 2023-11-14 RX ADMIN — AMLODIPINE BESYLATE 10 MG: 10 TABLET ORAL at 08:31

## 2023-11-14 RX ADMIN — PANTOPRAZOLE SODIUM 40 MG: 40 TABLET, DELAYED RELEASE ORAL at 08:31

## 2023-11-14 RX ADMIN — ICOSAPENT ETHYL 1 G: 1 CAPSULE ORAL at 17:26

## 2023-11-14 RX ADMIN — CARVEDILOL 25 MG: 12.5 TABLET, FILM COATED ORAL at 17:26

## 2023-11-14 RX ADMIN — LOSARTAN POTASSIUM 100 MG: 50 TABLET, FILM COATED ORAL at 08:31

## 2023-11-14 RX ADMIN — Medication 400 MG: at 08:31

## 2023-11-14 RX ADMIN — ICOSAPENT ETHYL 1 G: 1 CAPSULE ORAL at 08:31

## 2023-11-14 RX ADMIN — VENLAFAXINE HYDROCHLORIDE 150 MG: 150 CAPSULE, EXTENDED RELEASE ORAL at 08:31

## 2023-11-14 RX ADMIN — LATANOPROST 1 DROP: 50 SOLUTION OPHTHALMIC at 20:43

## 2023-11-14 RX ADMIN — INSULIN GLARGINE 35 UNITS: 100 INJECTION, SOLUTION SUBCUTANEOUS at 20:44

## 2023-11-14 RX ADMIN — ATORVASTATIN CALCIUM 40 MG: 40 TABLET, FILM COATED ORAL at 20:43

## 2023-11-14 RX ADMIN — CARVEDILOL 25 MG: 12.5 TABLET, FILM COATED ORAL at 08:31

## 2023-11-14 ASSESSMENT — PAIN SCALES - WONG BAKER: WONGBAKER_NUMERICALRESPONSE: NO HURT

## 2023-11-14 ASSESSMENT — COLUMBIA-SUICIDE SEVERITY RATING SCALE - C-SSRS
2. HAVE YOU ACTUALLY HAD ANY THOUGHTS OF KILLING YOURSELF?: NO
6. HAVE YOU EVER DONE ANYTHING, STARTED TO DO ANYTHING, OR PREPARED TO DO ANYTHING TO END YOUR LIFE?: NO
6. HAVE YOU EVER DONE ANYTHING, STARTED TO DO ANYTHING, OR PREPARED TO DO ANYTHING TO END YOUR LIFE?: NO
1. SINCE LAST CONTACT, HAVE YOU WISHED YOU WERE DEAD OR WISHED YOU COULD GO TO SLEEP AND NOT WAKE UP?: NO
1. SINCE LAST CONTACT, HAVE YOU WISHED YOU WERE DEAD OR WISHED YOU COULD GO TO SLEEP AND NOT WAKE UP?: NO
2. HAVE YOU ACTUALLY HAD ANY THOUGHTS OF KILLING YOURSELF?: NO

## 2023-11-14 ASSESSMENT — PAIN - FUNCTIONAL ASSESSMENT: PAIN_FUNCTIONAL_ASSESSMENT: 0-10

## 2023-11-14 ASSESSMENT — PAIN SCALES - GENERAL
PAINLEVEL_OUTOF10: 0 - NO PAIN

## 2023-11-14 NOTE — PROGRESS NOTES
"Madalyn Ricci is a 83 y.o. female on day 28 of admission presenting with Mood disorder (CMS/[facility]).    Level 1 note    Subjective   Case discussed in treatment team and chart reviewed.      Patient remains discharge ready pending optimal facility arrangements.     Patient reports no concerns maintaining appropriate gains.  She reports her mood is good.  She denies thoughts of harming self, others, and remains without evidence of psychosis nor diann.  She attends groups and meals, makes needs known, cooperates fully with staff.      Reviewed social work notes today:  KAYCEE was able to coordinate a meeting with APS and pt this morning for pt to discuss any concerns related to discharge placement.      KAYCEE was able to coordinate a meeting with assisted living and pt this morning for pt to complete paperwork with their staff.     KAYCEE called assisted living staff inquiring about updates and had to leave a message.      Per pt, the meeting with assisted living went well and they are going to accept her. Pt shared she doesn't believe they will be able to take her today. KAYCEE will continue to work with assisted living to determine when pt is able to be discharge.      KAYCEE spoke with [facility] to inform them that pt had her meeting today and were waiting for confirmation when pt can be discharge. [facility] shared they would reach out as well to confirm discharge date and time.``       Objective     Last Recorded Vitals  Blood pressure 142/65, pulse 70, temperature 36.6 °C (97.8 °F), temperature source Oral, resp. rate 18, height 1.626 m (5' 4.02\"), weight 127 kg (278 lb 15.9 oz), SpO2 95 %.    Results for orders placed or performed during the hospital encounter of 10/17/23 (from the past 96 hour(s))   POCT GLUCOSE   Result Value Ref Range    POCT Glucose 177 (H) 74 - 99 mg/dL   POCT GLUCOSE   Result Value Ref Range    POCT Glucose 92 74 - 99 mg/dL   POCT GLUCOSE   Result Value Ref Range    POCT " "Glucose 155 (H) 74 - 99 mg/dL   POCT GLUCOSE   Result Value Ref Range    POCT Glucose 137 (H) 74 - 99 mg/dL   POCT GLUCOSE   Result Value Ref Range    POCT Glucose 197 (H) 74 - 99 mg/dL   POCT GLUCOSE   Result Value Ref Range    POCT Glucose 86 74 - 99 mg/dL   POCT GLUCOSE   Result Value Ref Range    POCT Glucose 132 (H) 74 - 99 mg/dL   POCT GLUCOSE   Result Value Ref Range    POCT Glucose 172 (H) 74 - 99 mg/dL   POCT GLUCOSE   Result Value Ref Range    POCT Glucose 137 (H) 74 - 99 mg/dL   POCT GLUCOSE   Result Value Ref Range    POCT Glucose 153 (H) 74 - 99 mg/dL   POCT GLUCOSE   Result Value Ref Range    POCT Glucose 164 (H) 74 - 99 mg/dL   POCT GLUCOSE   Result Value Ref Range    POCT Glucose 155 (H) 74 - 99 mg/dL   POCT GLUCOSE   Result Value Ref Range    POCT Glucose 171 (H) 74 - 99 mg/dL   POCT GLUCOSE   Result Value Ref Range    POCT Glucose 112 (H) 74 - 99 mg/dL   POCT GLUCOSE   Result Value Ref Range    POCT Glucose 193 (H) 74 - 99 mg/dL         Review of Systems    Psychiatric ROS - Adult  Anxiety: Negative  Depression: negative  Delirium: negative  Psychosis: negative  Margret: negative  Safety Issues: none  Psychiatric ROS Comment: as noted    Physical Exam      Mental Status Exam  General: NAD  Appearance: Hospital attire  Attitude: pleasant  Behavior: cooperative  Motor Activity: using walker  Speech: normal rate and volume  Mood: \"ok\"  Affect: more open  Thought Process: more organized  Thought Content: no delusions elicited, denies SI/HI  Thought Perception: denies AH/VH/paranoia  Cognition: fair  Insight: fair  Judgement: fair       Psychiatric Risk Assessment  Violence Risk Assessment: major mental illness  Acute Risk of Harm to Others is Considered: moderate   Suicide Risk Assessment: age > 65 yrs old, , and current psychiatric illness  Protective Factors against Suicide: adherence to  treatment, fear of social disapproval, hopefulness/future orientation, and positive family " relationships  Acute Risk of Harm to Self is Considered: moderate    Relevant Results               Scheduled medications  amLODIPine, 10 mg, oral, Daily  atorvastatin, 40 mg, oral, Nightly  carvedilol, 25 mg, oral, BID with meals  icosapent ethyL, 1 g, oral, BID with meals  insulin glargine, 35 Units, subcutaneous, Nightly  latanoprost, 1 drop, Both Eyes, Nightly  losartan, 100 mg, oral, Daily  magnesium oxide, 400 mg, oral, Daily  pantoprazole, 40 mg, oral, Daily before breakfast  venlafaxine XR, 150 mg, oral, Daily      Continuous medications     PRN medications  PRN medications: acetaminophen, alum-mag hydroxide-simeth, dextrose 10 % in water (D10W), dextrose, diphenhydrAMINE **OR** diphenhydrAMINE, glucagon, magnesium hydroxide, OLANZapine **OR** OLANZapine, traZODone      Assessment/Plan   Principal Problem:    Mood disorder (CMS/[facility])  Active Problems:    Hyperlipidemia    GERD (gastroesophageal reflux disease)    Type 2 diabetes mellitus without complication, with long-term current use of insulin (CMS/[facility])    Primary hypertension    Biological -     Continue (as this is planned discharge medication list):  Effexor xr 150 mg daily for depression     Discussion with patient regarding risk benefits and alternatives and side effects with opportunity to ask questions and questions answered.  Patient given consent to the plan as noted    2. Psychological -       Patient is encouraged to participate with the therapeutic milieu and program and group therapy      3. Sociological -      Patient encouraged to cooperate with social work staff on issues relevant to discharge planning    We remain in close contact with social work to follow up on discharge as soon as able.                  I spent 15 minutes in the professional and overall care of this patient.    Parts of this chart have been completed using voice recognition software.  Please excuse any errors of transcription.  Despite the medical decision making time  stamp, my medical decision making has taken place during the patient's entire visit.  Thought process and reason for plan has been formulated from the time that I saw the patient until the time of disposition and is not specific to one specific moment during their visit and furthermore the medical decision making encompasses the entire chart and not only that represented in this note.      Navin Brooke, DO

## 2023-11-14 NOTE — GROUP NOTE
Group Topic: Cognitive Focus   Group Date: 11/14/2023  Start Time: 1000  End Time: 1040  Facilitators: Dmitriy Sanchez LCSW   Department: [facility]     Number of Participants: 4   Group Focus: other Gratitude  Treatment Modality: Cognitive Behavioral Therapy  Interventions utilized were group exercise and patient education  Purpose: feelings, communication skills, and self-worth    Name: Madalyn Ricci YOB: 1940   MR: [id number]      Facilitator:   Level of Participation: did not attend  Quality of Participation:  N/a  Interactions with others:  N/a  Mood/Affect:  N/a  Triggers (if applicable): N/a  Cognition:  N/a  Progress: Other  Comments: Pt was in a meeting with her assisted living and was unable to attend group. LISW-S went to pt afterwards to discuss topic and pt was cooperative and accepting.  Plan: continue with services

## 2023-11-14 NOTE — CARE PLAN
The patient's goals for the shift include no falls    The clinical goals for the shift include no falls    Over the shift, the patient did make progress toward the following goals. Barriers to progression include few, quite on board w/ her tx. plan. Recommendations to address these barriers include reassurance of abilities.      Problem: Fall/Injury  Goal: Verbalize understanding of personal risk factors for fall in the hospital  11/14/2023 0733 by Enma Barba RN  Outcome: Progressing  11/13/2023 1947 by Enma Barba RN  Outcome: Progressing     Problem: Fall/Injury  Goal: Pace activities to prevent fatigue by end of the shift  11/14/2023 0733 by Enma Barba RN  Outcome: Progressing  11/13/2023 1947 by Enma Barba RN  Outcome: Progressing

## 2023-11-14 NOTE — GROUP NOTE
Group Topic: Self-Care/Wellness   Group Date: 11/14/2023  Start Time: 1515  End Time: 1600  Facilitators: JENNIFER Gramajo   Department: [facility] REHABTH VIRTUAL    Number of Participants: 7   Group Focus: acceptance, coping skills, problem solving, and safety plan  Treatment Modality: Other: Recreation therapy  Interventions utilized were exploration, mental fitness, patient education, and problem solving  Purpose: coping skills, feelings, insight or knowledge, and self-care    Name: Madalyn Ricci YOB: 1940   MR: [id number]      Facilitator: Recreational Therapist  Level of Participation: active  Quality of Participation: appropriate/pleasant, attentive, cooperative, and engaged  Interactions with others: appropriate and gave feedback  Mood/Affect: appropriate, brightens with interaction, and positive  Triggers (if applicable): n/a  Cognition: coherent/clear, insightful, and logical  Progress: Significant  Comments: pt problem is depressed mood.   Plan: continue with services

## 2023-11-14 NOTE — GROUP NOTE
Group Topic: Self-Care/Wellness   Group Date: 11/14/2023  Start Time: 1045  End Time: 1130  Facilitators: JENNIFER Gramajo   Department: [facility] REHABTH VIRTUAL    Number of Participants: 6   Group Focus: anxiety, coping skills, problem solving, relaxation, and self-awareness  Treatment Modality: Other: Recreation therapy  Interventions utilized were exploration, mental fitness, patient education, and problem solving  Purpose: coping skills, feelings, insight or knowledge, self-care, and other: increase socialization, elevate mood,  enhance self esteem    Name: Madalyn Ricci YOB: 1940   MR: [id number]      Facilitator: Recreational Therapist  Level of Participation: active  Quality of Participation: appropriate/pleasant, attentive, cooperative, and engaged  Interactions with others: appropriate and gave feedback  Mood/Affect: appropriate  Triggers (if applicable): n/a  Cognition: coherent/clear  Progress: Significant  Comments: pt problem is depressed mood.  Plan: continue with services

## 2023-11-14 NOTE — PROGRESS NOTES
KAYCEE was able to coordinate a meeting with APS and pt this morning for pt to discuss any concerns related to discharge placement.     KAYCEE was able to coordinate a meeting with assisted living and pt this morning for pt to complete paperwork with their staff.    KAYCEE called assisted living staff inquiring about updates and had to leave a message.     Per pt, the meeting with assisted living went well and they are going to accept her. Pt shared she doesn't believe they will be able to take her today. KAYCEE will continue to work with assisted living to determine when pt is able to be discharge.     KAYCEE spoke with [facility] to inform them that pt had her meeting today and were waiting for confirmation when pt can be discharge. [facility] shared they would reach out as well to confirm discharge date and time.

## 2023-11-15 VITALS
HEIGHT: 64 IN | BODY MASS INDEX: 47.63 KG/M2 | DIASTOLIC BLOOD PRESSURE: 66 MMHG | SYSTOLIC BLOOD PRESSURE: 130 MMHG | OXYGEN SATURATION: 98 % | RESPIRATION RATE: 20 BRPM | WEIGHT: 278.99 LBS | HEART RATE: 72 BPM | TEMPERATURE: 98.1 F

## 2023-11-15 LAB — GLUCOSE BLD MANUAL STRIP-MCNC: 82 MG/DL (ref 74–99)

## 2023-11-15 PROCEDURE — 82947 ASSAY GLUCOSE BLOOD QUANT: CPT

## 2023-11-15 PROCEDURE — 2500000004 HC RX 250 GENERAL PHARMACY W/ HCPCS (ALT 636 FOR OP/ED): Performed by: INTERNAL MEDICINE

## 2023-11-15 PROCEDURE — 99238 HOSP IP/OBS DSCHRG MGMT 30/<: CPT | Performed by: PSYCHIATRY & NEUROLOGY

## 2023-11-15 PROCEDURE — 2500000001 HC RX 250 WO HCPCS SELF ADMINISTERED DRUGS (ALT 637 FOR MEDICARE OP): Performed by: PSYCHIATRY & NEUROLOGY

## 2023-11-15 PROCEDURE — 99232 SBSQ HOSP IP/OBS MODERATE 35: CPT | Performed by: INTERNAL MEDICINE

## 2023-11-15 PROCEDURE — 2500000001 HC RX 250 WO HCPCS SELF ADMINISTERED DRUGS (ALT 637 FOR MEDICARE OP): Performed by: INTERNAL MEDICINE

## 2023-11-15 RX ORDER — LATANOPROST 50 UG/ML
1 SOLUTION/ DROPS OPHTHALMIC NIGHTLY
Qty: 2.5 ML | Refills: 0 | Status: SHIPPED | OUTPATIENT
Start: 2023-11-15 | End: 2023-12-15

## 2023-11-15 RX ORDER — ICOSAPENT ETHYL 1 G/1
1 CAPSULE ORAL
Qty: 60 CAPSULE | Refills: 0 | Status: SHIPPED | OUTPATIENT
Start: 2023-11-15 | End: 2023-12-15

## 2023-11-15 RX ORDER — TRAZODONE HYDROCHLORIDE 50 MG/1
25 TABLET ORAL NIGHTLY PRN
Qty: 15 TABLET | Refills: 0 | Status: SHIPPED | OUTPATIENT
Start: 2023-11-15 | End: 2023-12-15

## 2023-11-15 RX ORDER — LOSARTAN POTASSIUM 100 MG/1
100 TABLET ORAL DAILY
Qty: 30 TABLET | Refills: 0 | Status: SHIPPED | OUTPATIENT
Start: 2023-11-15

## 2023-11-15 RX ORDER — AMLODIPINE BESYLATE 10 MG/1
10 TABLET ORAL DAILY
Qty: 30 TABLET | Refills: 0 | Status: SHIPPED | OUTPATIENT
Start: 2023-11-15 | End: 2023-12-15

## 2023-11-15 RX ORDER — ATORVASTATIN CALCIUM 40 MG/1
40 TABLET, FILM COATED ORAL NIGHTLY
Qty: 30 TABLET | Refills: 0 | Status: SHIPPED | OUTPATIENT
Start: 2023-11-15 | End: 2023-12-15

## 2023-11-15 RX ORDER — VENLAFAXINE HYDROCHLORIDE 150 MG/1
150 CAPSULE, EXTENDED RELEASE ORAL DAILY
Qty: 30 CAPSULE | Refills: 0 | Status: SHIPPED | OUTPATIENT
Start: 2023-11-15

## 2023-11-15 RX ORDER — PANTOPRAZOLE SODIUM 40 MG/1
40 TABLET, DELAYED RELEASE ORAL
Qty: 30 TABLET | Refills: 0 | Status: SHIPPED | OUTPATIENT
Start: 2023-11-15

## 2023-11-15 RX ORDER — CALCIUM CARBONATE 300MG(750)
400 TABLET,CHEWABLE ORAL DAILY
Qty: 30 TABLET | Refills: 0 | Status: SHIPPED | OUTPATIENT
Start: 2023-11-15 | End: 2023-12-15

## 2023-11-15 RX ORDER — CARVEDILOL 25 MG/1
25 TABLET ORAL
Qty: 60 TABLET | Refills: 0 | Status: SHIPPED | OUTPATIENT
Start: 2023-11-15 | End: 2023-12-15

## 2023-11-15 RX ORDER — INSULIN GLARGINE 100 [IU]/ML
35 INJECTION, SOLUTION SUBCUTANEOUS NIGHTLY
Qty: 10.5 ML | Refills: 0 | Status: SHIPPED | OUTPATIENT
Start: 2023-11-15 | End: 2023-12-15

## 2023-11-15 RX ADMIN — Medication 400 MG: at 08:16

## 2023-11-15 RX ADMIN — AMLODIPINE BESYLATE 10 MG: 10 TABLET ORAL at 08:16

## 2023-11-15 RX ADMIN — VENLAFAXINE HYDROCHLORIDE 150 MG: 150 CAPSULE, EXTENDED RELEASE ORAL at 08:15

## 2023-11-15 RX ADMIN — PANTOPRAZOLE SODIUM 40 MG: 40 TABLET, DELAYED RELEASE ORAL at 08:16

## 2023-11-15 RX ADMIN — CARVEDILOL 25 MG: 12.5 TABLET, FILM COATED ORAL at 08:16

## 2023-11-15 RX ADMIN — ICOSAPENT ETHYL 1 G: 1 CAPSULE ORAL at 08:16

## 2023-11-15 RX ADMIN — LOSARTAN POTASSIUM 100 MG: 50 TABLET, FILM COATED ORAL at 08:16

## 2023-11-15 ASSESSMENT — COLUMBIA-SUICIDE SEVERITY RATING SCALE - C-SSRS
6. HAVE YOU EVER DONE ANYTHING, STARTED TO DO ANYTHING, OR PREPARED TO DO ANYTHING TO END YOUR LIFE?: NO
1. SINCE LAST CONTACT, HAVE YOU WISHED YOU WERE DEAD OR WISHED YOU COULD GO TO SLEEP AND NOT WAKE UP?: NO
2. HAVE YOU ACTUALLY HAD ANY THOUGHTS OF KILLING YOURSELF?: NO

## 2023-11-15 ASSESSMENT — PAIN SCALES - GENERAL: PAINLEVEL_OUTOF10: 0 - NO PAIN

## 2023-11-15 NOTE — CARE PLAN
The patient's goals for the shift include no falling    The clinical goals for the shift include no falls    Over the shift, the patient did make progress toward the following goals. Barriers to progression include few if any, pt. Properly utilizes her walker for ambulation. Recommendations to address these barriers include positive reinforcement for proper device usage.      Problem: Fall/Injury  Goal: Pace activities to prevent fatigue by end of the shift  Outcome: Progressing     Problem: Fall/Injury  Goal: Verbalize understanding of personal risk factors for fall in the hospital  Outcome: Progressing

## 2023-11-15 NOTE — DISCHARGE INSTR - APPOINTMENTS
Madalyn is being discharged to assisted living to [facility] located at [address] [place] [address], [place] [address] today at 11:00 am. Madalyn will meet with their treatment team to continue medications and any other psychiatrics needs. Madalyn is to report to [facility] to meet with Enzo

## 2023-11-15 NOTE — CARE PLAN
The patient's goals for the shift include no falls    The clinical goals for the shift include no falls    Recommendations to address these barriers include continuing to educate pt on ways to reduce falls within a hospital setting and have pt teach back two ways in which they have learned.

## 2023-11-15 NOTE — SIGNIFICANT EVENT
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 using walker  Speech: normal rate and volume  Mood: \"ok\"  Affect: more open  Thought Process: more organized  Thought Content: no delusions elicited, denies SI/HI  Thought Perception: denies AH/VH/paranoia  Cognition: fair  Insight: fair  Judgement: fair    Outpatient Appointments/Follow-Ups  No future appointments[place]  [id number] [facility]  [facility], [address]  [facility] [id number] [phone]        Madalyn is being discharged to assisted living to [facility] located at [address] [place] [address], [place] [address] today at 11:00 am. Madalyn will meet with their treatment team to continue medications and any other psychiatrics needs. Madalyn is to report to [facility] to meet with Stephanie.     Parts of this chart have been completed using voice recognition software.  Please excuse any errors of transcription.  Despite the medical decision making time stamp, my medical decision making has taken place during the patient's entire visit.  Thought process and reason for plan has been formulated from the time that I saw the patient until the time of disposition and is not specific to one specific moment during their visit and furthermore the medical decision making encompasses the entire chart and not only that represented in " this note.      Navin Brooke, DO

## 2023-11-15 NOTE — DISCHARGE SUMMARY
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 Hospital attire  Attitude: pleasant  Behavior: cooperative  Motor Activity: using walker  Speech: normal rate and volume  Mood: \"ok\"  Affect: more open  Thought Process: more organized  Thought Content: no delusions elicited, denies SI/HI  Thought Perception: denies AH/VH/paranoia  Cognition: fair  Insight: fair  Judgement: fair     Outpatient Appointments/Follow-Ups  No future appointments[place]  [id number] [facility]  [facility], [address]  [facility] [id number] [phone]           Madalyn is being discharged to assisted living to [facility] located at [address] [place] [address], [place] [address] today at 11:00 am. Madalyn will meet with their treatment team to continue medications and any other psychiatrics needs. Madalyn is to report to [facility] to meet with Stephanie.      Parts of this chart have been completed using voice recognition software.  Please excuse any errors of transcription.  Despite the medical decision making time stamp, my medical decision making has taken place during the patient's entire visit.  Thought process and reason for plan has been formulated from the time that I saw the patient until the time of disposition and is not specific to one specific moment during their visit and furthermore the medical " decision making encompasses the entire chart and not only that represented in this note.        Navin Brooke, DO

## 2023-11-15 NOTE — PROGRESS NOTES
[facility]: [facility]  PROGRESS NOTE      Madalyn Ricci is a 83 y.o. female that is being seen  today for follow-up at [facility].  No chief complaint on file..  Subjective   Patient is being seen for follow-up of [facility].  Patient's blood pressure has been better today,   No recent falls or episodes of agitation.  Patient is usually in the wheelchair since she has difficulty with walking.  Denies any other symptoms.    ROS  Negative for fever or chills  Negative for sore throat, ear pain, nasal discharge  Negative for cough, shortness of breath or wheezing  Negative for chest pain, palpitations, swelling of legs  Negative for abdominal pain, constipation, diarrhea, blood in the stools  Negative for urinary complaints  Negative for headache, dizziness, weakness or numbness  Negative for joint pain.  Positive for difficulty in walking  Positive for anxiety  All other systems reviewed and were negative   Vitals:    11/15/23 0500   BP: 130/66   Pulse: 72   Resp: 20   Temp: 36.7 °C (98.1 °F)   SpO2: 98%      Physical Exam  Constitutional: Patient does not appear to be in any acute distress  Head and Face: NCAT  Eyes: Normal external exam, EOMI  ENT: Normal external inspection of ears and nose. Oropharynx normal.  Cardiovascular: RRR, S1/S2, no murmurs, rubs, or gallops, radial pulses +2, no edema of extremities  Pulmonary: CTAB, no respiratory distress.  Abdomen: +BS, soft, non-tender, nondistended, no guarding or rebound, no masses noted  MSK: No joint swelling, normal movements of all extremities. Range of motion- normal.  Skin- No lesions, contusions, or erythema.  Peripheral puslses palpable bilaterally 2+  Neuro: AAO X3, Cranial nerves 2-12 grossly intact,DTR 2+ in all 4 limbs   Psychiatric: Judgment intact. Appropriate mood and behavior    Diagnostic Results   Lab Results   Component Value Date    GLUCOSE 199 (H) 10/16/2023    CALCIUM 9.3 10/16/2023     10/16/2023    K 3.7  10/16/2023    CO2 26 10/16/2023     (H) 10/16/2023    BUN 31 (H) 10/16/2023    CREATININE 1.20 10/16/2023     Lab Results   Component Value Date    ALT 11 10/16/2023    AST 16 10/16/2023    ALKPHOS 74 10/16/2023    BILITOT 0.3 10/16/2023     Lab Results   Component Value Date    WBC 6.4 10/16/2023    HGB 14.1 10/16/2023    HCT 44.1 10/16/2023    MCV 90 10/16/2023     10/16/2023     Lab Results   Component Value Date    CHOL 138 06/26/2020    CHOL 142 09/20/2019    CHOL 131 (L) 03/05/2019     Lab Results   Component Value Date    HDL 36 (L) 06/26/2020    HDL 36 (L) 09/20/2019    HDL 37 (L) 03/05/2019     Lab Results   Component Value Date    LDLCALC 44 (L) 06/26/2020    LDLCALC 36 (L) 09/20/2019    LDLCALC 49 (L) 03/05/2019     Lab Results   Component Value Date    TRIG 291 (H) 06/26/2020    TRIG 350 (H) 09/20/2019    TRIG 224 (H) 03/05/2019     Lab Results   Component Value Date    HGBA1C 6.4 (H) 12/12/2020     Other labs not included in the list above were reviewed either before or during this encounter.    History    Past Medical History:   Diagnosis Date    Diabetes mellitus (CMS/HCC)     Hypertension      Past Surgical History:   Procedure Laterality Date    APPENDECTOMY  10/13/2015    Appendectomy    CATARACT EXTRACTION  10/13/2015    Cataract Surgery    CHOLECYSTECTOMY  10/13/2015    Cholecystectomy    CT GUIDED IMAGING FOR NEEDLE PLACEMENT  2/4/2015    CT GUIDED IMAGING FOR NEEDLE PLACEMENT [facility]    HEMICOLECTOMY  10/13/2015    Hemicolectomy    HYSTERECTOMY  10/13/2015    Hysterectomy    LUNG LOBECTOMY  10/13/2015    Lung Lobectomy     No family history on file.  No Known Allergies  No current facility-administered medications on file prior to encounter.     Current Outpatient Medications on File Prior to Encounter   Medication Sig Dispense Refill    amLODIPine (Norvasc) 10 mg tablet Take 1 tablet (10 mg) by mouth once daily.      atorvastatin (Lipitor) 40 mg tablet Take 1 tablet  "(40 mg) by mouth once daily.      carvedilol (Coreg) 25 mg tablet Take 1 tablet (25 mg) by mouth 2 times a day with meals.      [] cefdinir (Omnicef) 300 mg capsule Take 1 capsule (300 mg) by mouth 2 times a day for 7 days. 14 capsule 0    icosapent ethyL (Vascepa) 1 gram capsule Take 1 capsule (1 g) by mouth 2 times a day with meals.      insulin aspart (NovoLOG U-100 Insulin aspart) 100 unit/mL injection Inject 100 Units under the skin 3 times a day before meals. Take as directed per insulin instructions.      insulin degludec (Tresiba FlexTouch U-200) 200 unit/mL (3 mL) injection Inject 40 Units under the skin once daily at bedtime. Take as directed per insulin instructions.      latanoprost (Xalatan) 0.005 % ophthalmic solution Administer 1 drop into both eyes once daily at bedtime.      losartan (Cozaar) 50 mg tablet Take 2 tablets (100 mg) by mouth once daily.      magnesium oxide (Mag-Ox) 400 mg tablet Take 1 tablet (400 mg) by mouth once daily.      omeprazole (PriLOSEC) 40 mg DR capsule Take 1 capsule (40 mg) by mouth once daily in the morning. Take before meals. Do not crush or chew.      pen needle, diabetic (BD Traci 2nd Gen Pen Needle) 32 gauge x 5/32\" needle       potassium chloride ER (Micro-K) 10 mEq ER capsule Take 2 capsules (20 mEq) by mouth once daily. Do not crush or chew.      venlafaxine XR (Effexor-XR) 150 mg 24 hr capsule Take 1 capsule (150 mg) by mouth once daily. Do not crush or chew.      [DISCONTINUED] ferrous sulfate 325 (65 Fe) MG EC tablet Take 65 mg by mouth 3 times a day with meals. Do not crush, chew, or split.      [DISCONTINUED] fluticasone propion-salmeteroL (Advair) 115-21 mcg/actuation inhaler Inhale 2 puffs 2 times a day. Rinse mouth with water after use to reduce aftertaste and incidence of candidiasis. Do not swallow.      [DISCONTINUED] hydroCHLOROthiazide (HYDRODiuril) 50 mg tablet Take 1 tablet (50 mg) by mouth once daily.      [DISCONTINUED] vitamin E 450 mg " (1000 unit) capsule Take 100 Units by mouth once daily.         There is no immunization history on file for this patient.  Patient's medical history was reviewed and updated either before or during this encounter.  ASSESSMENT / PLAN:  Active Hospital Problems    Hyperlipidemia      GERD (gastroesophageal reflux disease)      Type 2 diabetes mellitus without complication, with long-term current use of insulin (CMS/[facility])      Primary hypertension      *Mood disorder (CMS/[facility])    Patient's blood pressure is better .  Blood sugars are better.No episode of hypoglycemia    denies any significant complaints.  Patient is on statins.  Patient is being seen by geropsych team.        Osiel Forman MD

## 2023-11-15 NOTE — NURSING NOTE
WISAM NOTE     Problem:  Depression     Behavior:  Upon assessment pt found sitting in day lounge watch tv being social with peers at this time. Pt voices no depression, SI or thoughts of self harm at this time. Pt reports no pain this writer at this time and rates it a 0/10 when asked. Pt compliant with blood sugar accu checks and medication compliant. Pt voices just looking forward to hoping to be discharged tomorrow. Pt continues to walk with a steady gait with walker at this time. Staff will continue to monitor Q15 for safety. Pt voices no other concerns at this time.      Group Participation: All day time groups   Appetite/Meals: Snack /100        Interventions:  This nurse completed a shift assessment and administered patient's scheduled night time medications.     Response:  Patient remained pleasant and calm and was cooperative throughout this shift assessment and medication administration.     Plan:  Continue to monitor for depression. Continue to monitor for patient safety
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
## 2024-01-04 ENCOUNTER — OFFICE VISIT (OUTPATIENT)
Dept: OPHTHALMOLOGY | Facility: CLINIC | Age: 84
End: 2024-01-04
Payer: MEDICARE

## 2024-01-04 DIAGNOSIS — H40.1131 PRIMARY OPEN ANGLE GLAUCOMA (POAG) OF BOTH EYES, MILD STAGE: Primary | ICD-10-CM

## 2024-01-04 PROCEDURE — 92020 GONIOSCOPY: CPT | Performed by: OPHTHALMOLOGY

## 2024-01-04 PROCEDURE — 92133 CPTRZD OPH DX IMG PST SGM ON: CPT | Performed by: OPHTHALMOLOGY

## 2024-01-04 PROCEDURE — 92083 EXTENDED VISUAL FIELD XM: CPT | Performed by: OPHTHALMOLOGY

## 2024-01-04 PROCEDURE — 99204 OFFICE O/P NEW MOD 45 MIN: CPT | Performed by: OPHTHALMOLOGY

## 2024-01-04 PROCEDURE — 76514 ECHO EXAM OF EYE THICKNESS: CPT | Performed by: OPHTHALMOLOGY

## 2024-01-04 RX ORDER — LATANOPROST 50 UG/ML
1 SOLUTION/ DROPS OPHTHALMIC NIGHTLY
Qty: 7.5 ML | Refills: 3 | Status: SHIPPED | OUTPATIENT
Start: 2024-01-04 | End: 2025-01-03

## 2024-01-04 ASSESSMENT — VISUAL ACUITY
CORRECTION_TYPE: GLASSES
OS_CC+: -2
OD_CC: 20/60
OS_CC: 20/50
METHOD: SNELLEN - LINEAR

## 2024-01-04 ASSESSMENT — CONF VISUAL FIELD
OD_SUPERIOR_NASAL_RESTRICTION: 0
OD_INFERIOR_NASAL_RESTRICTION: 0
OS_SUPERIOR_NASAL_RESTRICTION: 0
OS_SUPERIOR_TEMPORAL_RESTRICTION: 0
OS_NORMAL: 1
OD_INFERIOR_TEMPORAL_RESTRICTION: 0
OS_INFERIOR_TEMPORAL_RESTRICTION: 0
METHOD: COUNTING FINGERS
OS_INFERIOR_NASAL_RESTRICTION: 0
OD_SUPERIOR_TEMPORAL_RESTRICTION: 0
OD_NORMAL: 1

## 2024-01-04 ASSESSMENT — REFRACTION_WEARINGRX
SPECS_TYPE: BIFOCAL
OD_SPHERE: -0.50
OS_CYLINDER: -1.00
OD_CYLINDER: -1.00
OD_AXIS: 048
OS_SPHERE: -1.00
OS_ADD: +2.50
OD_ADD: +2.50
OS_AXIS: 110

## 2024-01-04 ASSESSMENT — EXTERNAL EXAM - LEFT EYE: OS_EXAM: NORMAL

## 2024-01-04 ASSESSMENT — EXTERNAL EXAM - RIGHT EYE: OD_EXAM: NORMAL

## 2024-01-04 ASSESSMENT — GONIOSCOPY
OD_NASAL: D45F 1+
OS_TEMPORAL: D45F 1+
OD_INFERIOR: D45F 1+
OD_TEMPORAL: D45F 1+
OS_SUPERIOR: D45F 1+
OS_INFERIOR: D45F 1+
OS_NASAL: D45F 1+
OD_SUPERIOR: D45F 1+

## 2024-01-04 ASSESSMENT — PACHYMETRY
OS_CT(UM): 515
OD_CT(UM): 513

## 2024-01-04 ASSESSMENT — CUP TO DISC RATIO
OS_RATIO: 0.6
OD_RATIO: 0.65

## 2024-01-04 ASSESSMENT — TONOMETRY
OD_IOP_MMHG: 15
OS_IOP_MMHG: 18
IOP_METHOD: GOLDMANN APPLANATION

## 2024-01-04 ASSESSMENT — SLIT LAMP EXAM - LIDS
COMMENTS: NORMAL
COMMENTS: NORMAL

## 2024-01-04 NOTE — PROGRESS NOTES
History    Chief Complaint    Glaucoma       HPI    NPV. 83y F. Hx Glaucoma, IOL OU, Diabetes A1C 6.4(12/2023). Pt states va is not as clear. Denies pain, irritation, redness. Occa floaters, No flashes  Last edited by Michelle Nunn on 1/4/2024  3:07 PM.        Problem List  Date Reviewed: 1/4/2024      Mood disorder (CMS/[facility])    Hyperlipidemia    GERD (gastroesophageal reflux disease)    Type 2 diabetes mellitus without complication, with long-term current use of insulin (CMS/[facility])    Primary hypertension       Past Medical History:   Diagnosis Date    Diabetes mellitus (CMS/HCC)     Hypertension      Past Surgical History:   Procedure Laterality Date    APPENDECTOMY  10/13/2015    Appendectomy    CATARACT EXTRACTION  10/13/2015    Cataract Surgery    CHOLECYSTECTOMY  10/13/2015    Cholecystectomy    CT GUIDED IMAGING FOR NEEDLE PLACEMENT  2/4/2015    CT GUIDED IMAGING FOR NEEDLE PLACEMENT [facility]    HEMICOLECTOMY  10/13/2015    Hemicolectomy    HYSTERECTOMY  10/13/2015    Hysterectomy    LUNG LOBECTOMY  10/13/2015    Lung Lobectomy     SOCIAL HISTORY   SMOKING:  reports that she has never smoked. She has never used smokeless tobacco.  DRUG USE:    has no history on file for drug use.    FAMILY HISTORY  family history is not on file.    CURRENT MEDICATIONS  Current Outpatient Medications (Ophthalmology pharm classes)   Medication Sig Dispense Refill    latanoprost (Xalatan) 0.005 % ophthalmic solution Administer 1 drop into both eyes once daily at bedtime. 7.5 mL 3     Current Outpatient Medications (Other)   Medication Sig Dispense Refill    amLODIPine (Norvasc) 10 mg tablet Take 1 tablet (10 mg) by mouth once daily. 30 tablet 0    atorvastatin (Lipitor) 40 mg tablet Take 1 tablet (40 mg) by mouth once daily at bedtime. 30 tablet 0    carvedilol (Coreg) 25 mg tablet Take 1 tablet (25 mg) by mouth 2 times a day with meals. 60 tablet 0    icosapent ethyL (Vascepa) 1 gram capsule Take 1 capsule (1 g) by  "mouth 2 times a day with meals. 60 capsule 0    insulin glargine (Lantus) 100 unit/mL injection Inject 35 Units under the skin once daily at bedtime. Take as directed per insulin instructions. 10.5 mL 0    losartan (Cozaar) 100 mg tablet Take 1 tablet (100 mg) by mouth once daily. 30 tablet 0    pantoprazole (ProtoNix) 40 mg EC tablet Take 1 tablet (40 mg) by mouth once daily in the morning. Take before meals. Do not crush, chew, or split. 30 tablet 0    pen needle, diabetic (BD Traci 2nd Gen Pen Needle) 32 gauge x 5/32\" needle       traZODone (Desyrel) 50 mg tablet Take 0.5 tablets (25 mg) by mouth as needed at bedtime for sleep. 15 tablet 0    venlafaxine XR (Effexor-XR) 150 mg 24 hr capsule Take 1 capsule (150 mg) by mouth once daily. 30 capsule 0       Exam   Visual Acuity (Snellen - Linear)         Right Left    Dist cc 20/60 20/50 -2    Dist ph cc NI NI      Correction: Glasses              Edited by: Michelle Nunn              Wearing Rx       Wearing Rx         Sphere Cylinder Axis Add    Right -0.50 -1.00 048 +2.50    Left -1.00 -1.00 110 +2.50      Age: 2y    Type: Bifocal                  Not recorded       Pupils       Pupils         Pupils    Right PERRL, No APD    Left PERRL, No APD                  Intraocular pressure was 15 in the right eye and 18 in the left eye using Goldmann Applanation.  Extraocular Movement       Extraocular Movement         Right Left     Full Full                  Pachymetry       Pachymetry (1/4/2024)         Right Left    Thickness 513 515                  Not recorded        External Exam         Right Left    External Normal Normal              Slit Lamp Exam         Right Left    Lids/Lashes Normal Normal    Conjunctiva/Sclera White and quiet White and quiet    Cornea pee pee    Anterior Chamber Deep and quiet Deep and quiet    Iris Round and reactive Round and reactive    Lens pciol pciol    Anterior Vitreous Normal Normal              Fundus Exam         Right Left    " "Disc Normal, tilted Normal, tilted    C/D Ratio 0.65 0.6                   <div id=\"MAIN_EXAM_REVIEWED\"></div>       Diagnostics  Don Visual Field - OU - Both Eyes          Nasal defects OD  Normal OS         OCT, Optic Nerve - OU - Both Eyes          Left Eye  Images reviewed.     Notes  Os only, robust               Plan   -  The encounter diagnosis was Primary open angle glaucoma (POAG) of both eyes, mild stage.  -  Referred By:  Yadira Churchill MD  -  IOP:  Last Tonometry OD 15 / OS 18 /Date 3:20 PM      Target OD: No Value exists for the : EPIC#IIG441 Target OS: No Value exists for the : EPIC#KGM613       Max pressure OD:   Date:         Max Pressure OS:   Date:    -    Not recorded         -    Pachymetry       Pachymetry (1/4/2024)         Right Left    Thickness 513 515                  -  Pathophysiology of glaucoma, and potential blinding nature of disease reviewed.      Importance of follow up and compliance stressed  Myopic field defect vs ntg  Patient has been on latanoprost for years, would like to restart  Ok to resume qhs OU  Rtc 3 months for DFE/OCT RNFL/OCT MAC      DRY EYE, BOTH EYES  START artificial tears    Possible hx of retinal injections  Will get oct mac next visit        "

## 2024-04-04 ENCOUNTER — OFFICE VISIT (OUTPATIENT)
Dept: OPHTHALMOLOGY | Facility: CLINIC | Age: 84
End: 2024-04-04
Payer: MEDICARE

## 2024-04-04 DIAGNOSIS — H35.372 EPIRETINAL MEMBRANE (ERM) OF LEFT EYE: ICD-10-CM

## 2024-04-04 DIAGNOSIS — H35.3131 EARLY DRY STAGE NONEXUDATIVE AGE-RELATED MACULAR DEGENERATION OF BOTH EYES: ICD-10-CM

## 2024-04-04 DIAGNOSIS — H40.1131 PRIMARY OPEN ANGLE GLAUCOMA (POAG) OF BOTH EYES, MILD STAGE: Primary | ICD-10-CM

## 2024-04-04 PROCEDURE — 92134 CPTRZ OPH DX IMG PST SGM RTA: CPT | Mod: BILATERAL PROCEDURE | Performed by: OPHTHALMOLOGY

## 2024-04-04 PROCEDURE — 99214 OFFICE O/P EST MOD 30 MIN: CPT | Performed by: OPHTHALMOLOGY

## 2024-04-04 ASSESSMENT — PACHYMETRY
OD_CT(UM): 513
OS_CT(UM): 515

## 2024-04-04 ASSESSMENT — CONF VISUAL FIELD
OS_SUPERIOR_NASAL_RESTRICTION: 0
OD_INFERIOR_TEMPORAL_RESTRICTION: 0
OS_INFERIOR_TEMPORAL_RESTRICTION: 0
OS_SUPERIOR_TEMPORAL_RESTRICTION: 0
OD_NORMAL: 1
OD_SUPERIOR_NASAL_RESTRICTION: 0
OS_INFERIOR_NASAL_RESTRICTION: 0
OD_SUPERIOR_TEMPORAL_RESTRICTION: 0
OS_NORMAL: 1
OD_INFERIOR_NASAL_RESTRICTION: 0

## 2024-04-04 ASSESSMENT — SLIT LAMP EXAM - LIDS
COMMENTS: NORMAL
COMMENTS: NORMAL

## 2024-04-04 ASSESSMENT — VISUAL ACUITY
OS_CC+: +1
OS_CC: 20/30
METHOD: SNELLEN - LINEAR
OD_CC+: -1
CORRECTION_TYPE: GLASSES
OD_CC: 20/50

## 2024-04-04 ASSESSMENT — ENCOUNTER SYMPTOMS
NEUROLOGICAL NEGATIVE: 0
CARDIOVASCULAR NEGATIVE: 0
GASTROINTESTINAL NEGATIVE: 0
ALLERGIC/IMMUNOLOGIC NEGATIVE: 0
EYES NEGATIVE: 0
CONSTITUTIONAL NEGATIVE: 0
ENDOCRINE NEGATIVE: 0
MUSCULOSKELETAL NEGATIVE: 0
RESPIRATORY NEGATIVE: 0
HEMATOLOGIC/LYMPHATIC NEGATIVE: 0
PSYCHIATRIC NEGATIVE: 0

## 2024-04-04 ASSESSMENT — EXTERNAL EXAM - LEFT EYE: OS_EXAM: NORMAL

## 2024-04-04 ASSESSMENT — TONOMETRY
OD_IOP_MMHG: 13
OS_IOP_MMHG: 15
IOP_METHOD: GOLDMANN APPLANATION

## 2024-04-04 ASSESSMENT — EXTERNAL EXAM - RIGHT EYE: OD_EXAM: NORMAL

## 2024-04-04 ASSESSMENT — CUP TO DISC RATIO
OD_RATIO: 0.65
OS_RATIO: 0.6

## 2024-04-04 NOTE — PROGRESS NOTES
History    Chief Complaint    Glaucoma       HPI       Glaucoma    In both eyes.             Comments    83 Year old female presents for 3 mo fuv. She uses latanoprost qhs, last used at 9 pm. She admits to forgetting drops a couple times a week. Pt denies any changes in vision. Pt denies eye pain.           Last edited by HUBERT Barth on 4/4/2024  3:16 PM.        Problem List  Date Reviewed: 1/4/2024      Mood disorder (CMS/HCC)    Hyperlipidemia    GERD (gastroesophageal reflux disease)    Type 2 diabetes mellitus without complication, with long-term current use of insulin (CMS/HCC)    Primary hypertension       Past Medical History:   Diagnosis Date    Diabetes mellitus (CMS/HCC)     Hypertension      Past Surgical History:   Procedure Laterality Date    APPENDECTOMY  10/13/2015    Appendectomy    CATARACT EXTRACTION  10/13/2015    Cataract Surgery    CHOLECYSTECTOMY  10/13/2015    Cholecystectomy    CT GUIDED IMAGING FOR NEEDLE PLACEMENT  2/4/2015    CT GUIDED IMAGING FOR NEEDLE PLACEMENT [facility]    HEMICOLECTOMY  10/13/2015    Hemicolectomy    HYSTERECTOMY  10/13/2015    Hysterectomy    LUNG LOBECTOMY  10/13/2015    Lung Lobectomy     SOCIAL HISTORY   SMOKING:  reports that she has never smoked. She has never used smokeless tobacco.  DRUG USE:    has no history on file for drug use.    FAMILY HISTORY  family history is not on file.    CURRENT MEDICATIONS  Current Outpatient Medications (Ophthalmology pharm classes)   Medication Sig Dispense Refill    latanoprost (Xalatan) 0.005 % ophthalmic solution Administer 1 drop into both eyes once daily at bedtime. 7.5 mL 3     Current Outpatient Medications (Other)   Medication Sig Dispense Refill    amLODIPine (Norvasc) 10 mg tablet Take 1 tablet (10 mg) by mouth once daily. 30 tablet 0    atorvastatin (Lipitor) 40 mg tablet Take 1 tablet (40 mg) by mouth once daily at bedtime. 30 tablet 0    carvedilol (Coreg) 25 mg tablet Take 1 tablet (25  "mg) by mouth 2 times a day with meals. 60 tablet 0    icosapent ethyL (Vascepa) 1 gram capsule Take 1 capsule (1 g) by mouth 2 times a day with meals. 60 capsule 0    insulin glargine (Lantus) 100 unit/mL injection Inject 35 Units under the skin once daily at bedtime. Take as directed per insulin instructions. 10.5 mL 0    losartan (Cozaar) 100 mg tablet Take 1 tablet (100 mg) by mouth once daily. 30 tablet 0    pantoprazole (ProtoNix) 40 mg EC tablet Take 1 tablet (40 mg) by mouth once daily in the morning. Take before meals. Do not crush, chew, or split. 30 tablet 0    pen needle, diabetic (BD Traci 2nd Gen Pen Needle) 32 gauge x 5/32\" needle       traZODone (Desyrel) 50 mg tablet Take 0.5 tablets (25 mg) by mouth as needed at bedtime for sleep. 15 tablet 0    venlafaxine XR (Effexor-XR) 150 mg 24 hr capsule Take 1 capsule (150 mg) by mouth once daily. 30 capsule 0       Exam   Visual Acuity (Snellen - Linear)         Right Left    Dist cc 20/50 -1 20/30 +1      Correction: Glasses              Edited by: Eze Alvarez, COT              Not recorded       Not recorded       Pupils       Pupils         Pupils    Right PERRL, No APD    Left PERRL, No APD                  Intraocular pressure was 13 in the right eye and 15 in the left eye using Goldmann Applanation.  Extraocular Movement       Extraocular Movement         Right Left     Full Full                  Not recorded       Not recorded        External Exam         Right Left    External Normal Normal              Slit Lamp Exam         Right Left    Lids/Lashes Normal Normal    Conjunctiva/Sclera White and quiet White and quiet    Cornea pee pee    Anterior Chamber Deep and quiet Deep and quiet    Iris Round and reactive Round and reactive    Lens pciol pciol    Anterior Vitreous Normal Normal              Fundus Exam         Right Left    Disc Normal, tilted Normal, tilted    C/D Ratio 0.65 0.6    Macula maculpopathy maculpopathy    Vessels Normal " "Normal    Periphery Normal Normal                   <div id=\"MAIN_EXAM_REVIEWED\"></div>       Diagnostics      OCT, Retina - OU - Both Eyes          Drusen/maculopathy OD>OS         OCT, Optic Nerve - OU - Both Eyes          Right Eye  Images reviewed.     Left Eye  Images reviewed.     Notes  ROBUST OU AND symmetric               Plan   -  The primary encounter diagnosis was Primary open angle glaucoma (POAG) of both eyes, mild stage. A diagnosis of Epiretinal membrane (ERM) of left eye was also pertinent to this visit.  -  Referred By:  Yadira Churchill MD  -  IOP:  Last Tonometry OD 13 / OS 15 /Date 3:19 PM      Target OD: No Value exists for the : EPIC#ORK793 Target OS: No Value exists for the : EPIC#[id number]       Max pressure OD:   Date:         Max Pressure OS:   Date:    -    Not recorded         -    Not recorded       -  Pathophysiology of glaucoma, and potential blinding nature of disease reviewed.      Importance of follow up and compliance stressed  Myopic field defect vs ntg  Testing most consistent with former  Ok to continue latanoprost qhs OU  Rtc 6 months for IOP check      DRY EYE, BOTH EYES  START artificial tears BID    Possible hx of retinal injections  No fluid on exam/oct mac      "

## 2024-08-01 ENCOUNTER — HOSPITAL ENCOUNTER (OUTPATIENT)
Dept: RADIOLOGY | Facility: CLINIC | Age: 84
Discharge: HOME | End: 2024-08-01
Payer: MEDICARE

## 2024-08-01 DIAGNOSIS — M25.462 EFFUSION, LEFT KNEE: ICD-10-CM

## 2024-08-01 PROCEDURE — 73723 MRI JOINT LWR EXTR W/O&W/DYE: CPT | Mod: LT

## 2024-08-01 PROCEDURE — 73723 MRI JOINT LWR EXTR W/O&W/DYE: CPT | Mod: LEFT SIDE | Performed by: RADIOLOGY

## 2024-08-01 PROCEDURE — 2550000001 HC RX 255 CONTRASTS

## 2024-08-01 PROCEDURE — A9575 INJ GADOTERATE MEGLUMI 0.1ML: HCPCS

## 2024-08-01 RX ORDER — GADOTERATE MEGLUMINE 376.9 MG/ML
20 INJECTION INTRAVENOUS
Status: COMPLETED | OUTPATIENT
Start: 2024-08-01 | End: 2024-08-01

## 2024-08-29 ENCOUNTER — APPOINTMENT (OUTPATIENT)
Dept: ORTHOPEDIC SURGERY | Facility: CLINIC | Age: 84
End: 2024-08-29
Payer: MEDICARE

## 2024-08-29 ENCOUNTER — HOSPITAL ENCOUNTER (OUTPATIENT)
Dept: RADIOLOGY | Facility: CLINIC | Age: 84
Discharge: HOME | End: 2024-08-29
Payer: MEDICARE

## 2024-08-29 VITALS — HEIGHT: 62 IN | WEIGHT: 236 LBS | BODY MASS INDEX: 43.43 KG/M2

## 2024-08-29 DIAGNOSIS — L02.419 SUBCUTANEOUS ABSCESS OF KNEE REGION: ICD-10-CM

## 2024-08-29 DIAGNOSIS — M25.562 CHRONIC PAIN OF LEFT KNEE: Primary | ICD-10-CM

## 2024-08-29 DIAGNOSIS — G89.29 CHRONIC PAIN OF LEFT KNEE: ICD-10-CM

## 2024-08-29 DIAGNOSIS — M25.562 CHRONIC PAIN OF LEFT KNEE: ICD-10-CM

## 2024-08-29 DIAGNOSIS — G89.29 CHRONIC PAIN OF LEFT KNEE: Primary | ICD-10-CM

## 2024-08-29 PROCEDURE — 1159F MED LIST DOCD IN RCRD: CPT | Performed by: STUDENT IN AN ORGANIZED HEALTH CARE EDUCATION/TRAINING PROGRAM

## 2024-08-29 PROCEDURE — 73564 X-RAY EXAM KNEE 4 OR MORE: CPT | Mod: LT

## 2024-08-29 PROCEDURE — 99205 OFFICE O/P NEW HI 60 MIN: CPT | Performed by: STUDENT IN AN ORGANIZED HEALTH CARE EDUCATION/TRAINING PROGRAM

## 2024-08-29 ASSESSMENT — PAIN SCALES - GENERAL: PAINLEVEL_OUTOF10: 0 - NO PAIN

## 2024-08-29 ASSESSMENT — PAIN - FUNCTIONAL ASSESSMENT: PAIN_FUNCTIONAL_ASSESSMENT: 0-10

## 2024-08-29 NOTE — PROGRESS NOTES
TRAV/TKA Related Summary           L hip: N  L knee: N  R hip: N  R knee: N  Lumbar surgery or significant pathology: N            CC/SUBJECTIVE/HPI            PCP: Bri Patel DO  Referring provider: No ref. provider found  Madalyn Ricci is a 84 y.o. female presenting for L knee pain. She had a fall sometime earlier this year (uncertain when) and has been having pain ever since. She has been seeing her PCP Dr. Desouza for management up until now. At this point, the pain is limiting activities of daily living and hobbies.    L knee  Symptoms  Pain: 5/10  Onset: chronic/gradual  Duration: >2yrs  Location: front knee  Quality: catch/lock  Limitations: none reported  Ambulation limit due to pain: With walker (baseline)  Other symptoms: leg locks up and cannot stand for long  Treatment  Tried: OTCs  Most recent injection <3mo ago? na  Assistive device: walker (baseline)  Treatment attempted for >2yrs and is no longer effective            HISTORIES (System Generated and Pt Reported on Form Today)       Dental  Pt reported: none    PMH  Pt reported: Denies heart/lung/kidney/liver issues, DM, stroke, seizure, bariatric surgery, anticoag, MRSA, cancer, personal/familial coagulopathies except: none  System generated (PMH, problem list both included since EMR change caused discrepancies):   Past Medical History:   Diagnosis Date    Diabetes mellitus (Multi)     Hypertension      Patient Active Problem List   Diagnosis    Mood disorder (CMS-HCC)    Hyperlipidemia    GERD (gastroesophageal reflux disease)    Type 2 diabetes mellitus without complication, with long-term current use of insulin (Multi)    Primary hypertension     PSH  Pt reported: Per above.   System generated:   Past Surgical History:   Procedure Laterality Date    APPENDECTOMY  10/13/2015    Appendectomy    CATARACT EXTRACTION  10/13/2015    Cataract Surgery    CHOLECYSTECTOMY  10/13/2015    Cholecystectomy    CT GUIDED IMAGING FOR NEEDLE PLACEMENT   "2/4/2015    CT GUIDED IMAGING FOR NEEDLE PLACEMENT [facility]    HEMICOLECTOMY  10/13/2015    Hemicolectomy    HYSTERECTOMY  10/13/2015    Hysterectomy    LUNG LOBECTOMY  10/13/2015    Lung Lobectomy     Family Hx  Pt reported clot/coagulopathies: none    Social Hx  Pt reported substance use: N  System generated:   Social History     Tobacco Use    Smoking status: Never    Smokeless tobacco: Never     Allergies  Pt reported (meds, metals): N  System generated:   Allergies   Allergen Reactions    Adhesive Other    Adhesive Tape-Silicones Rash     Current Meds  System generated:   Current Outpatient Medications:     amLODIPine (Norvasc) 10 mg tablet, Take 1 tablet (10 mg) by mouth once daily., Disp: 30 tablet, Rfl: 0    atorvastatin (Lipitor) 40 mg tablet, Take 1 tablet (40 mg) by mouth once daily at bedtime., Disp: 30 tablet, Rfl: 0    carvedilol (Coreg) 25 mg tablet, Take 1 tablet (25 mg) by mouth 2 times a day with meals., Disp: 60 tablet, Rfl: 0    icosapent ethyL (Vascepa) 1 gram capsule, Take 1 capsule (1 g) by mouth 2 times a day with meals., Disp: 60 capsule, Rfl: 0    insulin glargine (Lantus) 100 unit/mL injection, Inject 35 Units under the skin once daily at bedtime. Take as directed per insulin instructions., Disp: 10.5 mL, Rfl: 0    latanoprost (Xalatan) 0.005 % ophthalmic solution, Administer 1 drop into both eyes once daily at bedtime., Disp: 7.5 mL, Rfl: 3    losartan (Cozaar) 100 mg tablet, Take 1 tablet (100 mg) by mouth once daily., Disp: 30 tablet, Rfl: 0    pantoprazole (ProtoNix) 40 mg EC tablet, Take 1 tablet (40 mg) by mouth once daily in the morning. Take before meals. Do not crush, chew, or split., Disp: 30 tablet, Rfl: 0    pen needle, diabetic (BD Traci 2nd Gen Pen Needle) 32 gauge x 5/32\" needle, , Disp: , Rfl:     traZODone (Desyrel) 50 mg tablet, Take 0.5 tablets (25 mg) by mouth as needed at bedtime for sleep., Disp: 15 tablet, Rfl: 0    venlafaxine XR (Effexor-XR) 150 mg 24 hr " "capsule, Take 1 capsule (150 mg) by mouth once daily., Disp: 30 capsule, Rfl: 0    ROS: Neg except HPI            OBJECTIVE            Physical exam  Estimated body mass index is 43.35 kg/m² as calculated from the following:    Height as of 8/1/24: 1.575 m (5' 2\").    Weight as of 8/1/24: 108 kg (237 lb).  Gen/psych: NAD, conversational, appropriate    Ambulation  Gait: antalgic gait w walker  Assistive device: walker  Spine  Lumbar tenderness: neg  Limited ROM: neg, with no radiation or pain with flex/ex, lateral bending  SLR test: neg    Focused MSK exam: L  Knee  Thrust: neg  Skin/incision: Normal at possible incision site (median parapatellar approach)  Effusion: trace  Alignment: varus  Alignment correctable: partially  Knee tenderness: patellofemoral, inferior medial  Pes or Gerdy's tenderness: pos  Crepitation: none  Extension: passive flexion contracture 10° and active extension lag 0°  Flexion: 120°  Anterior drawer: stable  Posterior drawer: stable  Varus/Valgus in extension: stable  Varus/Valgus in flexion: stable  Referred pain to knee with hip 90° flexion and 45° ER/IR: neg  Tenderness and Erythema on over area of L knee anterolateral abscess  Neurovascular    Strength: 4+/5 hip/knee/ankle flexion and extension  Sensory (L2-S1): SILT throughout lower extremity  Edema/stasis: no pitting edema  Pulse: DP 2+, PT 2+    Imaging  8/29/2024 L knee radiographs ([facility], WB AP/lateral, WB AP flexion) on my read: Joint space narrowing (medial tibiofemoral severe, lateral tibiofemoral moderate, patellofemoral moderate) with osteophytes, sclerosis, and subchondral cysts. Limb alignment varus.  8/1/2024 L knee MRI: Severe medial tibiofemoral OA with associated medial meniscus tear.  Moderate sized synovial cyst.  Small multilobulated rim-enhancing fluid collection within the subcutaneous soft tissues overlying the anterior aspect of the distal patellar tendon and anterior lateral compartments suspicious for abscess " with surrounding cellulitis.            ASSESSMENT & PLAN           Patient verbalized understanding of below A&P. All questions answered.  L knee DJD  Differential includes radicular pain from spine or referred pain from hip. H&P most consistent with knee origin.  General information  Evaluation, imaging, diagnosis, and treatment options (conservative and surgical as well as risks, benefits, recovery, and outcomes) discussed. Conservative options should be maximized and include activity modification, bracing, weight loss, PT, home exercise, local pain control (ice, heat, topicals), OTCs, and assistive devices. Once exhausted, injections may provide relief. If these fail to improve pain, function, and quality of life, elective arthroplasty may be an option.  Shared decision making   CSI may be beneficial, but I would like her to be evaluated for the possible abscess first  Follow-up: After general surgery consultation.  L leg possible anterior lateral extra-articular subcutaneous abscess  MRI shows what could be a small abscess with surrounding cellulitis.  Provided referral to general surgery for further workup.  Defer orthopedic care, including intra-articular injections, pending their input.  Pt has no systemic signs of illness.  Discussed she should report to the emergency department immediately if she begins to develop fever, chills, or other signs of infection.  Follow-up: With general surgery.  PMH, PSH, other considerations discussed  DM and elective arthroplasty req A1c <7.5 (6.4 on 12/12/2020)  Lives at [facility] assisted living.  She lives in independent area.  BMI<40 but on spectrum of increased risk of surgical complications.  Occupation: Works in payroll  Hobbies: None provided

## 2024-10-10 ENCOUNTER — APPOINTMENT (OUTPATIENT)
Dept: OPHTHALMOLOGY | Facility: CLINIC | Age: 84
End: 2024-10-10
Payer: MEDICARE

## 2024-12-19 ENCOUNTER — APPOINTMENT (OUTPATIENT)
Dept: ORTHOPEDIC SURGERY | Facility: CLINIC | Age: 84
End: 2024-12-19
Payer: MEDICARE

## 2024-12-20 ENCOUNTER — OFFICE VISIT (OUTPATIENT)
Dept: ORTHOPEDIC SURGERY | Facility: CLINIC | Age: 84
End: 2024-12-20
Payer: MEDICARE

## 2024-12-20 DIAGNOSIS — L02.419 SUBCUTANEOUS ABSCESS OF KNEE REGION: ICD-10-CM

## 2024-12-20 DIAGNOSIS — G89.29 CHRONIC PAIN OF LEFT KNEE: Primary | ICD-10-CM

## 2024-12-20 DIAGNOSIS — M17.12 PRIMARY OSTEOARTHRITIS OF LEFT KNEE: ICD-10-CM

## 2024-12-20 DIAGNOSIS — S83.242A ACUTE MEDIAL MENISCUS TEAR OF LEFT KNEE, INITIAL ENCOUNTER: ICD-10-CM

## 2024-12-20 DIAGNOSIS — M25.562 CHRONIC PAIN OF LEFT KNEE: Primary | ICD-10-CM

## 2024-12-20 PROCEDURE — 1160F RVW MEDS BY RX/DR IN RCRD: CPT | Performed by: PHYSICIAN ASSISTANT

## 2024-12-20 PROCEDURE — 1159F MED LIST DOCD IN RCRD: CPT | Performed by: PHYSICIAN ASSISTANT

## 2024-12-20 PROCEDURE — 99203 OFFICE O/P NEW LOW 30 MIN: CPT | Performed by: PHYSICIAN ASSISTANT

## 2024-12-20 PROCEDURE — 99213 OFFICE O/P EST LOW 20 MIN: CPT | Performed by: PHYSICIAN ASSISTANT

## 2024-12-20 PROCEDURE — 1036F TOBACCO NON-USER: CPT | Performed by: PHYSICIAN ASSISTANT

## 2024-12-20 NOTE — PROGRESS NOTES
Subjective      No chief complaint on file.       Past Surgical History:   Procedure Laterality Date    APPENDECTOMY  10/13/2015    Appendectomy    CATARACT EXTRACTION  10/13/2015    Cataract Surgery    CHOLECYSTECTOMY  10/13/2015    Cholecystectomy    CT GUIDED IMAGING FOR NEEDLE PLACEMENT  2/4/2015    CT GUIDED IMAGING FOR NEEDLE PLACEMENT [facility]    HEMICOLECTOMY  10/13/2015    Hemicolectomy    HYSTERECTOMY  10/13/2015    Hysterectomy    LUNG LOBECTOMY  10/13/2015    Lung Lobectomy        Past Medical History:   Diagnosis Date    Diabetes mellitus (Multi)     Hypertension         HPI  This 84 year old patient presents today with left knee pain (8/10). She was referred by her PCP Dr. Obando after her MRI of the left knee showed an area at the anterior aspect of the distal patellar tendon suspicious for abscesses or cellulitis. The patient states that this left knee pain has been worsening and persistent for months. The patient denies trauma or injury to the left knee. The patient states that the left knee pain is worse with and aggravated by prolonged walking and standing. She uses a walker while ambulating for support. The patient states that this left knee pain impairs their ability to complete normal activities of daily living. The patient has tried Tylenol and ibuprofen with no relief.    Allergies   Allergen Reactions    Adhesive Other    Adhesive Tape-Silicones Rash        No family history on file.     Social History     Socioeconomic History    Marital status:      Spouse name: Not on file    Number of children: Not on file    Years of education: Not on file    Highest education level: Not on file   Occupational History    Not on file   Tobacco Use    Smoking status: Never    Smokeless tobacco: Never   Substance and Sexual Activity    Alcohol use: Not on file    Drug use: Not on file    Sexual activity: Not on file   Other Topics Concern    Not on file   Social History Narrative    Not on  file     Social Drivers of Health     Financial Resource Strain: High Risk (10/17/2023)    Overall Financial Resource Strain (CARDIA)     Difficulty of Paying Living Expenses: Hard   Food Insecurity: Food Insecurity Present (10/17/2023)    Hunger Vital Sign     Worried About Running Out of Food in the Last Year: Sometimes true     Ran Out of Food in the Last Year: Sometimes true   Transportation Needs: Unmet Transportation Needs (10/17/2023)    PRAPARE - Transportation     Lack of Transportation (Medical): Yes     Lack of Transportation (Non-Medical): Yes   Physical Activity: Inactive (10/17/2023)    Exercise Vital Sign     Days of Exercise per Week: 0 days     Minutes of Exercise per Session: 0 min   Stress: Stress Concern Present (10/17/2023)    [place] citizen [place] [employer] Occup[employer] - Occupational Stress Questionnaire     Feeling of Stress : To some extent   Social Connections: Moderately Integrated (10/17/2023)    Social Connection and Isolation Panel [NHANES]     Frequency of Communication with Friends and Family: Twice a week     Frequency of Social Gatherings with Friends and Family: Once a week     Attends Synagogue Services: 1 to 4 times per year     Active Member of Clubs or Organizations: Yes     Attends Club or Organization Meetings: Never     Marital Status:    Intimate Partner Violence: Not At Risk (10/18/2023)    Humiliation, Afraid, Rape, and Kick questionnaire     Fear of Current or Ex-Partner: No     Emotionally Abused: No     Physically Abused: No     Sexually Abused: No   Housing Stability: High Risk (10/17/2023)    Housing Stability Vital Sign     Unable to Pay for Housing in the Last Year: Yes     Number of Places Lived in the Last Year: 2     Unstable Housing in the Last Year: Yes        ROS  CARDIOLOGY:   Negative for chest pain, shortness of breath.   RESPIRATORY:   Negative for chest pain, shortness of breath.   MUSCULOSKELETAL:   See HPI for details.   NEUROLOGY:   Negative for  tingling, numbness, weakness.    Objective    There is no height or weight on file to calculate BMI.     Physical Exam  GENERAL:          General Appearance:  This is a pleasant patient with appropriate affect, in no acute distress.   DERMATOLOGY:          Skin: skin at the neck, upper and lower back, and trunk is intact. There is no evidence of skin rash, skin breakdown or ulceration, or atrophic skin change.   EXTREMITIES:          Vascular:  Right, left hands and feet are warm with good color and pulses. Right and left calf and thigh are nontender and nonswollen.   NEUROLOGICAL:          Orientation:  Patient is alert and oriented to person, place, time and situation. Right and left upper and lower extremity motor and sensory examinations are intact.  MUSCULOSKELETAL: Neck: No tenderness. No pain or limitation with range of motion. Back: No tenderness. Straight leg test negative bilaterally. Right and left hips: No tenderness over the right or left greater trochanter. Right and left lower extremity in good position. Right knee: Nontender. No pain with gentle ROM. left knee: There is diffuse tenderness over the knee. No evdience of cellulitis. There is soft tissue swelling at the patellar tendon, no definite abscess is seen. The patient has ROM from 5-120 degrees. McMurrays is positive. Anterior drawer and lachmans are negative. There is not an effusion present. The left knee is stable to valgus and varus stressing. Nontender in the left calf. Compartments are soft. Neurovascular is intact to light touch. The patient walks with a painful gait favoring the left  knee while walking.    MRI of the left knee done on 8-1-2024 shows:   IMPRESSION:  1. Small multilobulated rim enhancing fluid collection within the  subcutaneous soft tissues overlying the anterior aspect of the distal  patellar tendon and the anterior and lateral fascial compartments  suspicious for abscesses with associated surrounding  cellulitis.  Correlate with physical examination. Consider surgical consultation  for debridement.  2. Severe medial compartment osteoarthrosis with full-thickness  chondral loss. Severe complex degenerative tearing of the body and  posterior horn segments of the medial meniscus.  3. Small to moderate-sized popliteal fossa cyst and small knee joint  effusion.    X-rays of the left knee done on 9-1-2024 shows:  IMPRESSION:  1. Severe medial and mild lateral/patellofemoral compartment  osteoarthrosis. There is marked interval progression of degenerative  changes since 2011.    Diagnoses and all orders for this visit:  Chronic pain of left knee (Primary)  -     MR knee left wo IV contrast; Future  Primary osteoarthritis of left knee  -     MR knee left wo IV contrast; Future  Acute medial meniscus tear of left knee, initial encounter  -     MR knee left wo IV contrast; Future  Subcutaneous abscess of knee region  -     MR knee left wo IV contrast; Future     Options are discussed with the patient in detail. An MRI is ordered in office today to re-evaluate this left knee pain and swelling and to rule out abscess formation. The patient is instructed regarding activity modification and risk for further injury with falling or trauma, ice, provider directed at home gentle strengthening and ROM exercises, and the appropriate use of Tylenol as needed for pain with its potential adverse reactions and side effects. The patient understands. Follow up after MRI is complete or sooner as needed. Please note that this report has been produced using speech recognition software.  It may contain errors related to grammar, punctuation or spelling.  Electronically signed, but not reviewed.   Yue Bob PA-C

## 2024-12-20 NOTE — PATIENT INSTRUCTIONS
Thank you for coming to see us today!     Continue to rest, ice, and elevate  Tylenol for pain control  We are ordering an MRI in office today.     Please call central scheduling to schedule your MRI  Once you have a date for your MRI, call our office to schedule a follow up with Dr. Vanegas

## 2025-01-03 ENCOUNTER — LAB (OUTPATIENT)
Dept: LAB | Facility: LAB | Age: 85
End: 2025-01-03
Payer: MEDICARE

## 2025-01-03 ENCOUNTER — HOSPITAL ENCOUNTER (OUTPATIENT)
Dept: RADIOLOGY | Facility: HOSPITAL | Age: 85
Discharge: HOME | End: 2025-01-03
Payer: MEDICARE

## 2025-01-03 DIAGNOSIS — M17.12 PRIMARY OSTEOARTHRITIS OF LEFT KNEE: ICD-10-CM

## 2025-01-03 DIAGNOSIS — M25.562 CHRONIC PAIN OF LEFT KNEE: ICD-10-CM

## 2025-01-03 DIAGNOSIS — L02.419 SUBCUTANEOUS ABSCESS OF KNEE REGION: ICD-10-CM

## 2025-01-03 DIAGNOSIS — S83.242A ACUTE MEDIAL MENISCUS TEAR OF LEFT KNEE, INITIAL ENCOUNTER: ICD-10-CM

## 2025-01-03 DIAGNOSIS — G89.29 CHRONIC PAIN OF LEFT KNEE: ICD-10-CM

## 2025-01-03 PROCEDURE — 73721 MRI JNT OF LWR EXTRE W/O DYE: CPT | Mod: LT

## 2025-01-10 NOTE — PROGRESS NOTES
Subjective    Patient ID: Madalyn Ricci is a 84 y.o. female.    Chief Complaint: Pain of the Left Knee         Patient ID: Madalyn Ricci is a 84 y.o. female.    L Inj/Asp: L knee on 1/13/2025 11:18 AM  Indications: pain  Details: 22 G needle, medial approach  Medications: 1 mL lidocaine 10 mg/mL (1 %); 10 mg triamcinolone acetonide 40 mg/mL  Outcome: tolerated well, no immediate complications  Procedure, treatment alternatives, risks and benefits explained, specific risks discussed. Immediately prior to procedure a time out was called to verify the correct patient, procedure, equipment, support staff and site/side marked as required. Patient was prepped and draped in the usual sterile fashion.           Objective       Image Results:  MR knee left wo IV contrast  Narrative: Interpreted By:  Ryne Alvarez,   STUDY:  MR KNEE LEFT WO IV CONTRAST;      INDICATION:  Signs/Symptoms:left knee pain, rule out abcess.      COMPARISON:  August 1, 2024 previous MRI      Plain film radiographs August 29      ACCESSION NUMBER(S):  [id number]      ORDERING CLINICIAN:  MAGDALENO HAN      TECHNIQUE:  Multiplanar multisequential MRI left knee without contrast.      FINDINGS:  Complex tear posterior horn and body medial meniscus similar to the  prior exam with extensive maceration possibly with previous partial  medial meniscectomy.      Advanced medial compartment osteoarthritis noted.      Baker's cyst and joint effusion with loose body again noted unchanged.      Anterior cruciate ligament normal.      Posterior cruciate ligament normal.      Extensor tendons normal.      Collateral ligaments normal.      The previously described ellipsoid fluid collection in the pretibial  soft tissues has resolved.      No additional collections are identified.      No evidence of fracture.      No marrow edema or marrow replacement process.      Impression: Interval resolution of the previous pretibial fluid collections.      Degenerative  changes of the left knee grossly unchanged from prior.      Signed by: Ryne Alvarez 1/4/2025 6:39 AM  Dictation workstation:   [id number]      Assessment/Plan   Encounter Diagnoses:  Chronic pain of left knee    Primary osteoarthritis of left knee    Acute medial meniscus tear of left knee, initial encounter    Orders Placed This Encounter    L Inj/Asp: L knee     No follow-ups on file.Subjective      Chief Complaint   Patient presents with    Left Knee - Pain        Past Surgical History:   Procedure Laterality Date    APPENDECTOMY  10/13/2015    Appendectomy    CATARACT EXTRACTION  10/13/2015    Cataract Surgery    CHOLECYSTECTOMY  10/13/2015    Cholecystectomy    CT GUIDED IMAGING FOR NEEDLE PLACEMENT  2/4/2015    CT GUIDED IMAGING FOR NEEDLE PLACEMENT [facility]    HEMICOLECTOMY  10/13/2015    Hemicolectomy    HYSTERECTOMY  10/13/2015    Hysterectomy    LUNG LOBECTOMY  10/13/2015    Lung Lobectomy        Past Medical History:   Diagnosis Date    Diabetes mellitus (Multi)     Hypertension         HPI  This 84 year old patient presents today with left knee pain (8/10). She was referred by her PCP Dr. Obando after her previous MRI of the left knee done on 7- showed an area at the anterior aspect of the distal patellar tendon suspicious for abscesses or cellulitis. The patient states that this left knee pain has been worsening and persistent for months. The patient denies trauma or injury to the left knee. The patient states that the left knee pain is worse with and aggravated by attempting to get out of her wheelchair and attempting to walk or stand. She uses a walker while ambulating for support. The patient states that this left knee pain impairs her ability to do activities of daily living that are important to her.  She has tried Tylenol and also physical therapy with no relief. She presents today for MRI review of the MRI done on 12-.     Allergies   Allergen Reactions    Adhesive Other     Adhesive Tape-Silicones Rash        No family history on file.     Social History     Socioeconomic History    Marital status:      Spouse name: Not on file    Number of children: Not on file    Years of education: Not on file    Highest education level: Not on file   Occupational History    Not on file   Tobacco Use    Smoking status: Never    Smokeless tobacco: Never   Substance and Sexual Activity    Alcohol use: Not on file    Drug use: Not on file    Sexual activity: Not on file   Other Topics Concern    Not on file   Social History Narrative    Not on file     Social Drivers of Health     Financial Resource Strain: High Risk (10/17/2023)    Overall Financial Resource Strain (CARDIA)     Difficulty of Paying Living Expenses: Hard   Food Insecurity: Food Insecurity Present (10/17/2023)    Hunger Vital Sign     Worried About Running Out of Food in the Last Year: Sometimes true     Ran Out of Food in the Last Year: Sometimes true   Transportation Needs: Unmet Transportation Needs (10/17/2023)    PRAPARE - Transportation     Lack of Transportation (Medical): Yes     Lack of Transportation (Non-Medical): Yes   Physical Activity: Inactive (10/17/2023)    Exercise Vital Sign     Days of Exercise per Week: 0 days     Minutes of Exercise per Session: 0 min   Stress: Stress Concern Present (10/17/2023)    [employer] [place] [employer] - Occupational Stress Questionnaire     Feeling of Stress : To some extent   Social Connections: Moderately Integrated (10/17/2023)    Social Connection and Isolation Panel [NHANES]     Frequency of Communication with Friends and Family: Twice a week     Frequency of Social Gatherings with Friends and Family: Once a week     Attends Confucianist Services: 1 to 4 times per year     Active Member of Clubs or Organizations: Yes     Attends Club or Organization Meetings: Never     Marital Status:    Intimate Partner Violence: Not At Risk (10/18/2023)    Humiliation,  Afraid, Rape, and Kick questionnaire     Fear of Current or Ex-Partner: No     Emotionally Abused: No     Physically Abused: No     Sexually Abused: No   Housing Stability: High Risk (10/17/2023)    Housing Stability Vital Sign     Unable to Pay for Housing in the Last Year: Yes     Number of Places Lived in the Last Year: 2     Unstable Housing in the Last Year: Yes        ROS  CARDIOLOGY:   Negative for chest pain, shortness of breath.   RESPIRATORY:   Negative for chest pain, shortness of breath.   MUSCULOSKELETAL:   See HPI for details.   NEUROLOGY:   Negative for tingling, numbness, weakness.    Objective    There is no height or weight on file to calculate BMI.     Physical Exam  GENERAL:          General Appearance:  This is a pleasant patient with appropriate affect, in no acute distress.   DERMATOLOGY:          Skin: skin at the neck, upper and lower back, and trunk is intact. There is no evidence of skin rash, skin breakdown or ulceration, or atrophic skin change.   EXTREMITIES:          Vascular:  Right, left hands and feet are warm with good color and pulses. Right and left calf and thigh are nontender and nonswollen.   NEUROLOGICAL:          Orientation:  Patient is alert and oriented to person, place, time and situation. Right and left upper and lower extremity motor and sensory examinations are intact.  MUSCULOSKELETAL: Neck: No tenderness. No pain or limitation with range of motion. Back: No tenderness. Straight leg test negative bilaterally. Right and left hips: No tenderness over the right or left greater trochanter. Right and left lower extremity in good position. Right knee: Nontender. No pain with gentle ROM. left knee: There is diffuse tenderness over the knee. No evdience of cellulitis. There is soft tissue swelling at the patellar tendon, no definite abscess is seen or palpable. The patient has ROM from 5-120 degrees. McMurrays is positive. Anterior drawer and lachmans are negative. There is  not an effusion present. The left knee is stable to valgus and varus stressing. Nontender in the left calf. Compartments are soft. Neurovascular is intact to light touch. The patient walks with a painful gait favoring the left  knee while walking.    MRI of the left knee done on 8-1-2024 shows:   IMPRESSION:  1. Small multilobulated rim enhancing fluid collection within the  subcutaneous soft tissues overlying the anterior aspect of the distal  patellar tendon and the anterior and lateral fascial compartments  suspicious for abscesses with associated surrounding cellulitis.  Correlate with physical examination. Consider surgical consultation  for debridement.  2. Severe medial compartment osteoarthrosis with full-thickness  chondral loss. Severe complex degenerative tearing of the body and  posterior horn segments of the medial meniscus.  3. Small to moderate-sized popliteal fossa cyst and small knee joint  effusion.    X-rays of the left knee done on 9-1-2024 shows:  IMPRESSION:  1. Severe medial and mild lateral/patellofemoral compartment  osteoarthrosis. There is marked interval progression of degenerative  changes since 2011.    MR knee left wo IV contrast    Result Date: 1/4/2025  Interpreted By:  Ryne Alvarez, STUDY: MR KNEE LEFT WO IV CONTRAST;   INDICATION: Signs/Symptoms:left knee pain, rule out abcess.   COMPARISON: August 1, 2024 previous MRI   Plain film radiographs August 29   ACCESSION NUMBER(S): [id number]   ORDERING CLINICIAN: MAGDALENO HAN   TECHNIQUE: Multiplanar multisequential MRI left knee without contrast.   FINDINGS: Complex tear posterior horn and body medial meniscus similar to the prior exam with extensive maceration possibly with previous partial medial meniscectomy.   Advanced medial compartment osteoarthritis noted.   Baker's cyst and joint effusion with loose body again noted unchanged.   Anterior cruciate ligament normal.   Posterior cruciate ligament normal.   Extensor tendons  normal.   Collateral ligaments normal.   The previously described ellipsoid fluid collection in the pretibial soft tissues has resolved.   No additional collections are identified.   No evidence of fracture.   No marrow edema or marrow replacement process.       Interval resolution of the previous pretibial fluid collections.   Degenerative changes of the left knee grossly unchanged from prior.   Signed by: Ryne Alvarez 1/4/2025 6:39 AM Dictation workstation:   [id number]    I reviewed the imaging studies listed above with the patient in the office today.  Madalyn was seen today for pain.  Diagnoses and all orders for this visit:  Chronic pain of left knee (Primary)  Primary osteoarthritis of left knee  Acute medial meniscus tear of left knee, initial encounter  Other orders  -     L Inj/Asp: L knee     Options are discussed with the patient in detail. The patient is instructed regarding activity modification and risk for further injury with falling or trauma, ice, provider directed at home gentle strengthening and ROM exercises, and the appropriate use of Tylenol as needed for pain with its potential adverse reactions and side effects. The patient understands.  She states that this left knee pain is disabling and she requests a discussion of further options.  Again I reviewed the MRI listed above which showed that the previous pretibial fluid collections have resolved.  Cortisone injection to the left knee is discussed in the office today and the patient prefers to have a cortisone injection to her left knee today.  This is done today.  See procedures below.  Return as needed.  Please note that this report has been produced using speech recognition software.  It may contain errors related to grammar, punctuation or spelling.  Electronically signed, but not reviewed.   Jm Vanegas MD

## 2025-01-13 ENCOUNTER — OFFICE VISIT (OUTPATIENT)
Dept: ORTHOPEDIC SURGERY | Facility: CLINIC | Age: 85
End: 2025-01-13
Payer: MEDICARE

## 2025-01-13 DIAGNOSIS — M17.12 PRIMARY OSTEOARTHRITIS OF LEFT KNEE: ICD-10-CM

## 2025-01-13 DIAGNOSIS — S83.242A ACUTE MEDIAL MENISCUS TEAR OF LEFT KNEE, INITIAL ENCOUNTER: ICD-10-CM

## 2025-01-13 DIAGNOSIS — G89.29 CHRONIC PAIN OF LEFT KNEE: Primary | ICD-10-CM

## 2025-01-13 DIAGNOSIS — M25.562 CHRONIC PAIN OF LEFT KNEE: Primary | ICD-10-CM

## 2025-01-13 PROCEDURE — 2500000004 HC RX 250 GENERAL PHARMACY W/ HCPCS (ALT 636 FOR OP/ED): Performed by: ORTHOPAEDIC SURGERY

## 2025-01-13 PROCEDURE — 99213 OFFICE O/P EST LOW 20 MIN: CPT | Mod: 25 | Performed by: ORTHOPAEDIC SURGERY

## 2025-01-13 PROCEDURE — 1159F MED LIST DOCD IN RCRD: CPT | Performed by: ORTHOPAEDIC SURGERY

## 2025-01-13 PROCEDURE — 20610 DRAIN/INJ JOINT/BURSA W/O US: CPT | Mod: LT | Performed by: ORTHOPAEDIC SURGERY

## 2025-01-13 PROCEDURE — 99213 OFFICE O/P EST LOW 20 MIN: CPT | Performed by: ORTHOPAEDIC SURGERY

## 2025-01-13 RX ORDER — TRIAMCINOLONE ACETONIDE 40 MG/ML
10 INJECTION, SUSPENSION INTRA-ARTICULAR; INTRAMUSCULAR
Status: COMPLETED | OUTPATIENT
Start: 2025-01-13 | End: 2025-01-13

## 2025-01-13 RX ORDER — LIDOCAINE HYDROCHLORIDE 10 MG/ML
1 INJECTION, SOLUTION INFILTRATION; PERINEURAL
Status: COMPLETED | OUTPATIENT
Start: 2025-01-13 | End: 2025-01-13

## 2025-01-13 RX ADMIN — TRIAMCINOLONE ACETONIDE 10 MG: 400 INJECTION, SUSPENSION INTRA-ARTICULAR; INTRAMUSCULAR at 11:18

## 2025-01-13 RX ADMIN — LIDOCAINE HYDROCHLORIDE 1 ML: 10 INJECTION, SOLUTION INFILTRATION; PERINEURAL at 11:18

## 2025-01-13 ASSESSMENT — ENCOUNTER SYMPTOMS
DEPRESSION: 0
LOSS OF SENSATION IN FEET: 0
OCCASIONAL FEELINGS OF UNSTEADINESS: 0

## 2025-01-13 ASSESSMENT — PAIN DESCRIPTION - DESCRIPTORS: DESCRIPTORS: ACHING;SHOOTING;THROBBING

## 2025-01-13 ASSESSMENT — PAIN SCALES - GENERAL: PAINLEVEL_OUTOF10: 10 - WORST POSSIBLE PAIN

## 2025-01-13 ASSESSMENT — PAIN - FUNCTIONAL ASSESSMENT: PAIN_FUNCTIONAL_ASSESSMENT: 0-10

## 2025-01-13 NOTE — CARE PLAN
The patient's goals for the shift include d/c    The clinical goals for the shift include no falls    Over the shift, the patient did make progress toward the following goals. Barriers to progression include few, as pt. Has good sense of self. Recommendations to address these barriers include continued education and monitoring for safety.      Problem: Fall/Injury  Goal: Verbalize understanding of personal risk factors for fall in the hospital  11/13/2023 1947 by Enma Barba RN  Outcome: Progressing  11/13/2023 0802 by Enma Barba RN  Outcome: Progressing     Problem: Fall/Injury  Goal: Verbalize understanding of risk factor reduction measures to prevent injury from fall in the home  11/13/2023 1947 by Enma Barba RN  Outcome: Progressing  11/13/2023 0802 by Enma Barba RN  Outcome: Progressing     Problem: Fall/Injury  Goal: Pace activities to prevent fatigue by end of the shift  11/13/2023 1947 by Enma Barba RN  Outcome: Progressing  11/13/2023 0802 by Enma Barba RN  Outcome: Progressing     Problem: Diabetes  Goal: No changes in neurological exam by end of shift  11/13/2023 1947 by Enma Barba RN  Outcome: Progressing  11/13/2023 0802 by Enma Barba RN  Outcome: Progressing      13-Jan-2025 05:34

## 2025-01-13 NOTE — PATIENT INSTRUCTIONS
Thank you for coming to see us today!     Continue to use tylenol for pain control.   Rest, ice and elevate and remember to do exercises.     Follow up as needed

## 2025-04-16 PROCEDURE — RXMED WILLOW AMBULATORY MEDICATION CHARGE

## 2025-04-28 ENCOUNTER — PHARMACY VISIT (OUTPATIENT)
Dept: PHARMACY | Facility: CLINIC | Age: 85
End: 2025-04-28
Payer: MEDICARE
